# Patient Record
Sex: MALE | Race: WHITE | NOT HISPANIC OR LATINO | Employment: OTHER | URBAN - METROPOLITAN AREA
[De-identification: names, ages, dates, MRNs, and addresses within clinical notes are randomized per-mention and may not be internally consistent; named-entity substitution may affect disease eponyms.]

---

## 2018-06-05 ENCOUNTER — OFFICE VISIT CONVERTED (OUTPATIENT)
Dept: FAMILY MEDICINE CLINIC | Facility: CLINIC | Age: 68
End: 2018-06-05
Attending: NURSE PRACTITIONER

## 2018-06-14 ENCOUNTER — OFFICE VISIT CONVERTED (OUTPATIENT)
Dept: FAMILY MEDICINE CLINIC | Facility: CLINIC | Age: 68
End: 2018-06-14
Attending: NURSE PRACTITIONER

## 2019-02-08 ENCOUNTER — HOSPITAL ENCOUNTER (OUTPATIENT)
Dept: SLEEP MEDICINE | Facility: HOSPITAL | Age: 69
Discharge: HOME | End: 2019-02-08

## 2019-03-22 ENCOUNTER — HOSPITAL ENCOUNTER (OUTPATIENT)
Dept: ONCOLOGY | Facility: HOSPITAL | Age: 69
Discharge: HOME | End: 2019-03-22
Attending: NURSE PRACTITIONER

## 2019-03-22 ENCOUNTER — OFFICE VISIT CONVERTED (OUTPATIENT)
Dept: ONCOLOGY | Facility: HOSPITAL | Age: 69
End: 2019-03-22
Attending: NURSE PRACTITIONER

## 2019-03-22 LAB
ALBUMIN SERPL-MCNC: 3.8 G/DL (ref 3.5–5)
ALBUMIN/GLOB SERPL: 1.2 {RATIO} (ref 1.4–2.6)
ALP SERPL-CCNC: 93 U/L (ref 56–155)
ALT SERPL-CCNC: 23 U/L (ref 10–40)
ANION GAP SERPL CALC-SCNC: 11 MMOL/L (ref 8–19)
AST SERPL-CCNC: 19 U/L (ref 15–50)
BASOPHILS # BLD AUTO: 0.03 10*3/UL (ref 0–0.2)
BASOPHILS NFR BLD AUTO: 0.3 % (ref 0–3)
BILIRUB SERPL-MCNC: 0.55 MG/DL (ref 0.2–1.3)
BUN SERPL-MCNC: 19 MG/DL (ref 5–25)
BUN/CREAT SERPL: 18 {RATIO} (ref 6–20)
CALCIUM SERPL-MCNC: 9.1 MG/DL (ref 8.7–10.4)
CHLORIDE SERPL-SCNC: 108 MMOL/L (ref 99–111)
CONV ABS IMM GRAN: 0.02 10*3/UL (ref 0–0.2)
CONV CO2: 26 MMOL/L (ref 22–32)
CONV IMMATURE GRAN: 0.2 % (ref 0–1.8)
CONV TOTAL PROTEIN: 7.1 G/DL (ref 6.3–8.2)
CREAT UR-MCNC: 1.03 MG/DL (ref 0.7–1.2)
DEPRECATED RDW RBC AUTO: 47 FL (ref 35.1–43.9)
EOSINOPHIL # BLD AUTO: 0.19 10*3/UL (ref 0–0.7)
EOSINOPHIL # BLD AUTO: 2.2 % (ref 0–7)
ERYTHROCYTE [DISTWIDTH] IN BLOOD BY AUTOMATED COUNT: 13.2 % (ref 11.6–14.4)
GFR SERPLBLD BASED ON 1.73 SQ M-ARVRAT: >60 ML/MIN/{1.73_M2}
GLOBULIN UR ELPH-MCNC: 3.3 G/DL (ref 2–3.5)
GLUCOSE SERPL-MCNC: 78 MG/DL (ref 70–99)
HBA1C MFR BLD: 14 G/DL (ref 14–18)
HCT VFR BLD AUTO: 41.2 % (ref 42–52)
LDH SERPL-CCNC: 253 U/L (ref 120–240)
LYMPHOCYTES # BLD AUTO: 4.59 10*3/UL (ref 1–5)
MCH RBC QN AUTO: 32.5 PG (ref 27–31)
MCHC RBC AUTO-ENTMCNC: 34 G/DL (ref 33–37)
MCV RBC AUTO: 95.6 FL (ref 80–96)
MONOCYTES # BLD AUTO: 0.69 10*3/UL (ref 0.2–1.2)
MONOCYTES NFR BLD AUTO: 7.9 % (ref 3–10)
NEUTROPHILS # BLD AUTO: 3.17 10*3/UL (ref 2–8)
NEUTROPHILS NFR BLD AUTO: 36.6 % (ref 30–85)
NRBC CBCN: 0 % (ref 0–0.7)
OSMOLALITY SERPL CALC.SUM OF ELEC: 293 MOSM/KG (ref 273–304)
PLATELET # BLD AUTO: 102 10*3/UL (ref 130–400)
PMV BLD AUTO: 13.3 FL (ref 9.4–12.4)
POTASSIUM SERPL-SCNC: 4.2 MMOL/L (ref 3.5–5.3)
RBC # BLD AUTO: 4.31 10*6/UL (ref 4.7–6.1)
SODIUM SERPL-SCNC: 141 MMOL/L (ref 135–147)
VARIANT LYMPHS NFR BLD MANUAL: 52.8 % (ref 20–45)
WBC # BLD AUTO: 8.69 10*3/UL (ref 4.8–10.8)

## 2019-04-02 ENCOUNTER — HOSPITAL ENCOUNTER (OUTPATIENT)
Dept: CT IMAGING | Facility: HOSPITAL | Age: 69
Discharge: HOME | End: 2019-04-02
Attending: NURSE PRACTITIONER

## 2019-04-19 ENCOUNTER — HOSPITAL ENCOUNTER (OUTPATIENT)
Dept: URGENT CARE | Facility: CLINIC | Age: 69
Discharge: HOME | End: 2019-04-19
Attending: PHYSICIAN ASSISTANT

## 2019-04-22 LAB
B BURGDOR IGG+IGM SER-ACNC: NORMAL
E CHAFFEENSIS IGG TITR SER IF: NEGATIVE {TITER}
E. CHAFFEENSIS (HME) IGM TITER: NEGATIVE

## 2019-04-23 LAB
R RICKETTSI IGG SER QL IA: NEGATIVE
R RICKETTSI IGM TITR SER: 0.4 INDEX (ref 0–0.89)

## 2019-08-01 ENCOUNTER — HOSPITAL ENCOUNTER (OUTPATIENT)
Dept: CT IMAGING | Facility: HOSPITAL | Age: 69
Discharge: HOME | End: 2019-08-01
Attending: NURSE PRACTITIONER

## 2019-08-01 LAB
CREAT BLD-MCNC: 0.9 MG/DL (ref 0.6–1.4)
GFR SERPLBLD BASED ON 1.73 SQ M-ARVRAT: >60 ML/MIN/{1.73_M2}

## 2019-08-06 ENCOUNTER — OFFICE VISIT CONVERTED (OUTPATIENT)
Dept: ONCOLOGY | Facility: HOSPITAL | Age: 69
End: 2019-08-06
Attending: NURSE PRACTITIONER

## 2019-08-06 ENCOUNTER — HOSPITAL ENCOUNTER (OUTPATIENT)
Dept: ONCOLOGY | Facility: HOSPITAL | Age: 69
Discharge: HOME | End: 2019-08-06
Attending: NURSE PRACTITIONER

## 2019-08-06 LAB
ALBUMIN SERPL-MCNC: 3.9 G/DL (ref 3.5–5)
ALBUMIN/GLOB SERPL: 1.3 {RATIO} (ref 1.4–2.6)
ALP SERPL-CCNC: 99 U/L (ref 56–155)
ALT SERPL-CCNC: 17 U/L (ref 10–40)
ANION GAP SERPL CALC-SCNC: 14 MMOL/L (ref 8–19)
AST SERPL-CCNC: 19 U/L (ref 15–50)
BASOPHILS # BLD AUTO: 0.03 10*3/UL (ref 0–0.2)
BASOPHILS NFR BLD AUTO: 0.4 % (ref 0–3)
BILIRUB SERPL-MCNC: 0.52 MG/DL (ref 0.2–1.3)
BUN SERPL-MCNC: 19 MG/DL (ref 5–25)
BUN/CREAT SERPL: 23 {RATIO} (ref 6–20)
CALCIUM SERPL-MCNC: 8.7 MG/DL (ref 8.7–10.4)
CHLORIDE SERPL-SCNC: 105 MMOL/L (ref 99–111)
CONV ABS IMM GRAN: 0.04 10*3/UL (ref 0–0.2)
CONV CO2: 24 MMOL/L (ref 22–32)
CONV IMMATURE GRAN: 0.5 % (ref 0–1.8)
CONV TOTAL PROTEIN: 6.8 G/DL (ref 6.3–8.2)
CREAT UR-MCNC: 0.84 MG/DL (ref 0.7–1.2)
DEPRECATED RDW RBC AUTO: 48.6 FL (ref 35.1–43.9)
EOSINOPHIL # BLD AUTO: 0.25 10*3/UL (ref 0–0.7)
EOSINOPHIL # BLD AUTO: 2.9 % (ref 0–7)
ERYTHROCYTE [DISTWIDTH] IN BLOOD BY AUTOMATED COUNT: 13.6 % (ref 11.6–14.4)
FOLATE SERPL-MCNC: 15.3 NG/ML (ref 4.8–20)
GFR SERPLBLD BASED ON 1.73 SQ M-ARVRAT: >60 ML/MIN/{1.73_M2}
GLOBULIN UR ELPH-MCNC: 2.9 G/DL (ref 2–3.5)
GLUCOSE SERPL-MCNC: 107 MG/DL (ref 70–99)
HCT VFR BLD AUTO: 40.1 % (ref 42–52)
HGB BLD-MCNC: 13.1 G/DL (ref 14–18)
LDH SERPL-CCNC: 209 U/L (ref 120–240)
LYMPHOCYTES # BLD AUTO: 4.43 10*3/UL (ref 1–5)
LYMPHOCYTES NFR BLD AUTO: 52 % (ref 20–45)
MCH RBC QN AUTO: 31.5 PG (ref 27–31)
MCHC RBC AUTO-ENTMCNC: 32.7 G/DL (ref 33–37)
MCV RBC AUTO: 96.4 FL (ref 80–96)
MONOCYTES # BLD AUTO: 0.9 10*3/UL (ref 0.2–1.2)
MONOCYTES NFR BLD AUTO: 10.6 % (ref 3–10)
NEUTROPHILS # BLD AUTO: 2.87 10*3/UL (ref 2–8)
NEUTROPHILS NFR BLD AUTO: 33.6 % (ref 30–85)
NRBC CBCN: 0 % (ref 0–0.7)
OSMOLALITY SERPL CALC.SUM OF ELEC: 291 MOSM/KG (ref 273–304)
PLATELET # BLD AUTO: 86 10*3/UL (ref 130–400)
PMV BLD AUTO: 13.8 FL (ref 9.4–12.4)
POTASSIUM SERPL-SCNC: 4.2 MMOL/L (ref 3.5–5.3)
RBC # BLD AUTO: 4.16 10*6/UL (ref 4.7–6.1)
SODIUM SERPL-SCNC: 139 MMOL/L (ref 135–147)
VIT B12 SERPL-MCNC: 1388 PG/ML (ref 211–911)
WBC # BLD AUTO: 8.52 10*3/UL (ref 4.8–10.8)

## 2019-08-07 ENCOUNTER — HOSPITAL ENCOUNTER (OUTPATIENT)
Dept: MRI IMAGING | Facility: HOSPITAL | Age: 69
Discharge: HOME | End: 2019-08-07
Attending: PHYSICIAN ASSISTANT

## 2019-08-09 ENCOUNTER — HOSPITAL ENCOUNTER (OUTPATIENT)
Dept: OTHER | Facility: HOSPITAL | Age: 69
Discharge: HOME | End: 2019-08-09
Attending: PSYCHIATRY & NEUROLOGY

## 2019-08-09 ENCOUNTER — OFFICE VISIT CONVERTED (OUTPATIENT)
Dept: NEUROLOGY | Facility: CLINIC | Age: 69
End: 2019-08-09
Attending: PSYCHIATRY & NEUROLOGY

## 2019-08-22 ENCOUNTER — HOSPITAL ENCOUNTER (OUTPATIENT)
Dept: OTHER | Facility: HOSPITAL | Age: 69
Discharge: HOME | End: 2019-08-22
Attending: INTERNAL MEDICINE

## 2019-08-22 ENCOUNTER — OFFICE VISIT CONVERTED (OUTPATIENT)
Dept: FAMILY MEDICINE CLINIC | Facility: CLINIC | Age: 69
End: 2019-08-22
Attending: NURSE PRACTITIONER

## 2019-08-22 ENCOUNTER — HOSPITAL ENCOUNTER (OUTPATIENT)
Dept: GENERAL RADIOLOGY | Facility: HOSPITAL | Age: 69
Discharge: HOME | End: 2019-08-22
Attending: NURSE PRACTITIONER

## 2019-08-22 LAB
AMPHETAMINES UR QL SCN: NEGATIVE
BARBITURATES UR QL SCN: NEGATIVE
BENZODIAZ UR QL SCN: NEGATIVE
CONV COCAINE, UR: NEGATIVE
METHADONE UR QL SCN: NEGATIVE
OPIATES TESTED UR SCN: NEGATIVE
OXYCODONE UR QL SCN: NEGATIVE
PCP UR QL: NEGATIVE
THC SERPLBLD CFM-MCNC: NEGATIVE NG/ML

## 2019-09-09 ENCOUNTER — OFFICE VISIT CONVERTED (OUTPATIENT)
Dept: NEUROLOGY | Facility: CLINIC | Age: 69
End: 2019-09-09
Attending: PSYCHIATRY & NEUROLOGY

## 2019-09-23 ENCOUNTER — HOSPITAL ENCOUNTER (OUTPATIENT)
Dept: URGENT CARE | Facility: CLINIC | Age: 69
Discharge: HOME | End: 2019-09-23
Attending: NURSE PRACTITIONER

## 2019-09-27 ENCOUNTER — HOSPITAL ENCOUNTER (OUTPATIENT)
Dept: URGENT CARE | Facility: CLINIC | Age: 69
Discharge: HOME | End: 2019-09-27
Attending: FAMILY MEDICINE

## 2019-09-29 LAB — BACTERIA SPEC AEROBE CULT: NORMAL

## 2020-02-06 ENCOUNTER — CONVERSION ENCOUNTER (OUTPATIENT)
Dept: FAMILY MEDICINE CLINIC | Facility: CLINIC | Age: 70
End: 2020-02-06

## 2020-02-06 ENCOUNTER — OFFICE VISIT CONVERTED (OUTPATIENT)
Dept: FAMILY MEDICINE CLINIC | Facility: CLINIC | Age: 70
End: 2020-02-06
Attending: NURSE PRACTITIONER

## 2020-02-06 ENCOUNTER — HOSPITAL ENCOUNTER (OUTPATIENT)
Dept: FAMILY MEDICINE CLINIC | Facility: CLINIC | Age: 70
Discharge: HOME | End: 2020-02-06
Attending: NURSE PRACTITIONER

## 2020-02-06 LAB
ALBUMIN SERPL-MCNC: 4.9 G/DL (ref 3.5–5)
ALBUMIN/GLOB SERPL: 2.5 {RATIO} (ref 1.4–2.6)
ALP SERPL-CCNC: 88 U/L (ref 56–155)
ALT SERPL-CCNC: 15 U/L (ref 10–40)
ANION GAP SERPL CALC-SCNC: 18 MMOL/L (ref 8–19)
AST SERPL-CCNC: 17 U/L (ref 15–50)
BASOPHILS # BLD AUTO: 0.06 10*3/UL (ref 0–0.2)
BASOPHILS NFR BLD AUTO: 0.6 % (ref 0–3)
BILIRUB SERPL-MCNC: 0.51 MG/DL (ref 0.2–1.3)
BUN SERPL-MCNC: 14 MG/DL (ref 5–25)
BUN/CREAT SERPL: 16 {RATIO} (ref 6–20)
CALCIUM SERPL-MCNC: 9.4 MG/DL (ref 8.7–10.4)
CHLORIDE SERPL-SCNC: 105 MMOL/L (ref 99–111)
CHOLEST SERPL-MCNC: 129 MG/DL (ref 107–200)
CHOLEST/HDLC SERPL: 2.9 {RATIO} (ref 3–6)
CONV ABS IMM GRAN: 0.02 10*3/UL (ref 0–0.2)
CONV CO2: 24 MMOL/L (ref 22–32)
CONV IMMATURE GRAN: 0.2 % (ref 0–1.8)
CONV TOTAL PROTEIN: 6.9 G/DL (ref 6.3–8.2)
CREAT UR-MCNC: 0.89 MG/DL (ref 0.7–1.2)
DEPRECATED RDW RBC AUTO: 46.5 FL (ref 35.1–43.9)
EOSINOPHIL # BLD AUTO: 0.2 10*3/UL (ref 0–0.7)
EOSINOPHIL # BLD AUTO: 2.2 % (ref 0–7)
ERYTHROCYTE [DISTWIDTH] IN BLOOD BY AUTOMATED COUNT: 12.9 % (ref 11.6–14.4)
EST. AVERAGE GLUCOSE BLD GHB EST-MCNC: 120 MG/DL
GFR SERPLBLD BASED ON 1.73 SQ M-ARVRAT: >60 ML/MIN/{1.73_M2}
GLOBULIN UR ELPH-MCNC: 2 G/DL (ref 2–3.5)
GLUCOSE SERPL-MCNC: 90 MG/DL (ref 70–99)
HBA1C MFR BLD: 5.8 % (ref 3.5–5.7)
HCT VFR BLD AUTO: 42.8 % (ref 42–52)
HDLC SERPL-MCNC: 44 MG/DL (ref 40–60)
HGB BLD-MCNC: 13.8 G/DL (ref 14–18)
LDLC SERPL CALC-MCNC: 71 MG/DL (ref 70–100)
LYMPHOCYTES # BLD AUTO: 4.91 10*3/UL (ref 1–5)
LYMPHOCYTES NFR BLD AUTO: 52.9 % (ref 20–45)
MCH RBC QN AUTO: 31.5 PG (ref 27–31)
MCHC RBC AUTO-ENTMCNC: 32.2 G/DL (ref 33–37)
MCV RBC AUTO: 97.7 FL (ref 80–96)
MONOCYTES # BLD AUTO: 0.78 10*3/UL (ref 0.2–1.2)
MONOCYTES NFR BLD AUTO: 8.4 % (ref 3–10)
NEUTROPHILS # BLD AUTO: 3.32 10*3/UL (ref 2–8)
NEUTROPHILS NFR BLD AUTO: 35.7 % (ref 30–85)
NRBC CBCN: 0 % (ref 0–0.7)
OSMOLALITY SERPL CALC.SUM OF ELEC: 296 MOSM/KG (ref 273–304)
PLATELET # BLD AUTO: 103 10*3/UL (ref 130–400)
PMV BLD AUTO: 13.2 FL (ref 9.4–12.4)
POTASSIUM SERPL-SCNC: 4.4 MMOL/L (ref 3.5–5.3)
PSA SERPL-MCNC: 1.16 NG/ML (ref 0–4)
RBC # BLD AUTO: 4.38 10*6/UL (ref 4.7–6.1)
SODIUM SERPL-SCNC: 143 MMOL/L (ref 135–147)
T4 FREE SERPL-MCNC: 1.4 NG/DL (ref 0.9–1.8)
TRIGL SERPL-MCNC: 70 MG/DL (ref 40–150)
TSH SERPL-ACNC: 3.59 M[IU]/L (ref 0.27–4.2)
VLDLC SERPL-MCNC: 14 MG/DL (ref 5–37)
WBC # BLD AUTO: 9.29 10*3/UL (ref 4.8–10.8)

## 2020-07-15 ENCOUNTER — OFFICE VISIT CONVERTED (OUTPATIENT)
Dept: FAMILY MEDICINE CLINIC | Facility: CLINIC | Age: 70
End: 2020-07-15
Attending: NURSE PRACTITIONER

## 2020-07-15 ENCOUNTER — CONVERSION ENCOUNTER (OUTPATIENT)
Dept: FAMILY MEDICINE CLINIC | Facility: CLINIC | Age: 70
End: 2020-07-15

## 2020-07-18 ENCOUNTER — HOSPITAL ENCOUNTER (OUTPATIENT)
Dept: URGENT CARE | Facility: CLINIC | Age: 70
Discharge: HOME | End: 2020-07-18

## 2020-08-04 ENCOUNTER — HOSPITAL ENCOUNTER (OUTPATIENT)
Dept: ONCOLOGY | Facility: HOSPITAL | Age: 70
Discharge: HOME | End: 2020-08-04
Attending: NURSE PRACTITIONER

## 2020-08-04 ENCOUNTER — OFFICE VISIT CONVERTED (OUTPATIENT)
Dept: ONCOLOGY | Facility: HOSPITAL | Age: 70
End: 2020-08-04
Attending: NURSE PRACTITIONER

## 2020-08-04 LAB
ANION GAP SERPL CALC-SCNC: 11 MMOL/L (ref 8–19)
BASOPHILS # BLD AUTO: 0.03 10*3/UL (ref 0–0.2)
BASOPHILS NFR BLD AUTO: 0.4 % (ref 0–3)
BUN SERPL-MCNC: 16 MG/DL (ref 5–25)
BUN/CREAT SERPL: 16 {RATIO} (ref 6–20)
CALCIUM SERPL-MCNC: 8.8 MG/DL (ref 8.7–10.4)
CHLORIDE SERPL-SCNC: 107 MMOL/L (ref 99–111)
CONV ABS IMM GRAN: 0.01 10*3/UL (ref 0–0.54)
CONV CO2: 28 MMOL/L (ref 22–32)
CONV EOSINOPHILS PERCENT BY MANUAL COUNT: 2.8 % (ref 0–7)
CONV IMMATURE GRAN: 0.1 % (ref 0–0.4)
CREAT UR-MCNC: 0.98 MG/DL (ref 0.7–1.2)
EOSINOPHIL # BLD MANUAL: 0.2 10*3/UL (ref 0–0.7)
ERYTHROCYTE [DISTWIDTH] IN BLOOD BY AUTOMATED COUNT: 12.9 % (ref 11.5–14.5)
ERYTHROCYTE [DISTWIDTH] IN BLOOD BY AUTOMATED COUNT: 46 FL
GFR SERPLBLD BASED ON 1.73 SQ M-ARVRAT: >60 ML/MIN/{1.73_M2}
GLUCOSE SERPL-MCNC: 86 MG/DL (ref 70–99)
HBA1C MFR BLD: 13.4 G/DL (ref 14–18)
HCT VFR BLD AUTO: 41.7 % (ref 42–52)
LDH SERPL-CCNC: 221 U/L (ref 120–240)
LYMPHOCYTES # BLD AUTO: 4 10*3/UL (ref 1–5)
LYMPHOCYTES NFR BLD AUTO: 55.3 % (ref 20–45)
MCH RBC QN AUTO: 31.2 PG (ref 27–31)
MCHC RBC AUTO-ENTMCNC: 32.1 G/DL (ref 33–37)
MCV RBC AUTO: 97.2 FL (ref 80–96)
MONOCYTES # BLD AUTO: 0.52 10*3/UL (ref 0.2–1.2)
MONOCYTES NFR BLD MANUAL: 7.2 % (ref 3–10)
NEUTROPHILS # BLD AUTO: 2.47 10*3/UL (ref 2–8)
NEUTROPHILS NFR BLD MANUAL: 34.2 % (ref 30–85)
OSMOLALITY SERPL CALC.SUM OF ELEC: 293 MOSM/KG (ref 273–304)
PLATELET # BLD AUTO: 25 10*3/UL (ref 130–400)
PMV BLD AUTO: ABNORMAL FL (ref 7.4–10.4)
POTASSIUM SERPL-SCNC: 4.5 MMOL/L (ref 3.5–5.3)
RBC MORPH BLD: 4.29 10*6/UL (ref 4.7–6.1)
SODIUM SERPL-SCNC: 141 MMOL/L (ref 135–147)
WBC # BLD AUTO: 7.23 10*3/UL (ref 4.8–10.8)

## 2020-08-05 ENCOUNTER — HOSPITAL ENCOUNTER (OUTPATIENT)
Dept: OTHER | Facility: HOSPITAL | Age: 70
Discharge: HOME | End: 2020-08-05
Attending: NURSE PRACTITIONER

## 2020-08-05 LAB
BASOPHILS # BLD AUTO: 0.02 10*3/UL (ref 0–0.2)
BASOPHILS # BLD AUTO: 0.03 10*3/UL (ref 0–0.2)
BASOPHILS # BLD: 0 % (ref 0–3)
BASOPHILS NFR BLD AUTO: 0.2 % (ref 0–3)
BASOPHILS NFR BLD AUTO: 0.4 % (ref 0–3)
CONV ABS BANDS: 0 % (ref 1–5)
CONV ABS IMM GRAN: 0.02 10*3/UL (ref 0–0.2)
CONV ABS IMM GRAN: 0.02 10*3/UL (ref 0–0.2)
CONV ANISOCYTES: SLIGHT
CONV HYPOCHROMIA IN BLOOD BY LIGHT MICROSCOPY: SLIGHT
CONV IMMATURE GRAN: 0.2 % (ref 0–1.8)
CONV IMMATURE GRAN: 0.2 % (ref 0–1.8)
CONV SEGMENTED NEUTROPHILS: 47 % (ref 45–70)
DEPRECATED RDW RBC AUTO: 45 FL (ref 35.1–43.9)
DEPRECATED RDW RBC AUTO: 45.3 FL (ref 35.1–43.9)
EOSINOPHIL # BLD AUTO: 0.19 10*3/UL (ref 0–0.7)
EOSINOPHIL # BLD AUTO: 0.19 10*3/UL (ref 0–0.7)
EOSINOPHIL # BLD AUTO: 2.3 % (ref 0–7)
EOSINOPHIL # BLD AUTO: 2.4 % (ref 0–7)
EOSINOPHIL NFR BLD AUTO: 0 % (ref 0–7)
ERYTHROCYTE [DISTWIDTH] IN BLOOD BY AUTOMATED COUNT: 12.7 % (ref 11.6–14.4)
ERYTHROCYTE [DISTWIDTH] IN BLOOD BY AUTOMATED COUNT: 12.8 % (ref 11.6–14.4)
ERYTHROCYTE [SEDIMENTATION RATE] IN BLOOD: 5 MM/H (ref 0–20)
FERRITIN SERPL-MCNC: 75 NG/ML (ref 30–300)
FOLATE SERPL-MCNC: 15.2 NG/ML (ref 4.8–20)
HCT VFR BLD AUTO: 41.1 % (ref 42–52)
HCT VFR BLD AUTO: 41.8 % (ref 42–52)
HGB BLD-MCNC: 13.8 G/DL (ref 14–18)
HGB BLD-MCNC: 14 G/DL (ref 14–18)
IRON SATN MFR SERPL: 34 % (ref 20–55)
IRON SERPL-MCNC: 93 UG/DL (ref 70–180)
LDH SERPL-CCNC: 232 U/L (ref 120–240)
LYMPHOCYTES # BLD AUTO: 4.06 10*3/UL (ref 1–5)
LYMPHOCYTES # BLD AUTO: 4.23 10*3/UL (ref 1–5)
LYMPHOCYTES NFR BLD AUTO: 49.1 % (ref 20–45)
LYMPHOCYTES NFR BLD AUTO: 52.5 % (ref 20–45)
MCH RBC QN AUTO: 31.9 PG (ref 27–31)
MCH RBC QN AUTO: 32 PG (ref 27–31)
MCHC RBC AUTO-ENTMCNC: 33.5 G/DL (ref 33–37)
MCHC RBC AUTO-ENTMCNC: 33.6 G/DL (ref 33–37)
MCV RBC AUTO: 95.2 FL (ref 80–96)
MCV RBC AUTO: 95.4 FL (ref 80–96)
MICROCYTES BLD QL: SLIGHT
MONOCYTES # BLD AUTO: 0.5 10*3/UL (ref 0.2–1.2)
MONOCYTES # BLD AUTO: 0.8 10*3/UL (ref 0.2–1.2)
MONOCYTES NFR BLD AUTO: 6.2 % (ref 3–10)
MONOCYTES NFR BLD AUTO: 9.7 % (ref 3–10)
MONOCYTES NFR BLD MANUAL: 3 % (ref 3–10)
NEUTROPHILS # BLD AUTO: 3.09 10*3/UL (ref 2–8)
NEUTROPHILS # BLD AUTO: 3.18 10*3/UL (ref 2–8)
NEUTROPHILS NFR BLD AUTO: 38.3 % (ref 30–85)
NEUTROPHILS NFR BLD AUTO: 38.5 % (ref 30–85)
NRBC CBCN: 0 % (ref 0–0.7)
NRBC CBCN: 0 % (ref 0–0.7)
NUC CELL # PRT MANUAL: 0 /100{WBCS}
PLAT MORPH BLD: NORMAL
PLATELET # BLD AUTO: 91 10*3/UL (ref 130–400)
PLATELET # BLD AUTO: 92 10*3/UL (ref 130–400)
PMV BLD AUTO: 13.4 FL (ref 9.4–12.4)
PMV BLD AUTO: 13.4 FL (ref 9.4–12.4)
POIKILOCYTOSIS BLD QL SMEAR: SLIGHT
POLYCHROMASIA BLD QL SMEAR: SLIGHT
RBC # BLD AUTO: 4.31 10*6/UL (ref 4.7–6.1)
RBC # BLD AUTO: 4.39 10*6/UL (ref 4.7–6.1)
RETICS # AUTO: 0.06 10*6/UL (ref 0.03–0.1)
RETICS/RBC NFR AUTO: 1.48 % (ref 0.51–1.81)
SMALL PLATELETS BLD QL SMEAR: ABNORMAL
SMUDGE CELLS IN BLOOD BY LIGHT MICROSCOPY: ABNORMAL
T4 FREE SERPL-MCNC: 1.2 NG/DL (ref 0.9–1.8)
TIBC SERPL-MCNC: 273 UG/DL (ref 245–450)
TRANSFERRIN SERPL-MCNC: 191 MG/DL (ref 215–365)
TSH SERPL-ACNC: 1.95 M[IU]/L (ref 0.27–4.2)
VARIANT LYMPHS NFR BLD MANUAL: 50 % (ref 20–45)
VIT B12 SERPL-MCNC: 1856 PG/ML (ref 211–911)
WBC # BLD AUTO: 8.06 10*3/UL (ref 4.8–10.8)
WBC # BLD AUTO: 8.27 10*3/UL (ref 4.8–10.8)

## 2020-08-06 ENCOUNTER — OFFICE VISIT CONVERTED (OUTPATIENT)
Dept: FAMILY MEDICINE CLINIC | Facility: CLINIC | Age: 70
End: 2020-08-06
Attending: NURSE PRACTITIONER

## 2020-08-06 LAB
ALBUMIN SERPL-MCNC: 3.6 G/DL (ref 2.9–4.4)
ALBUMIN/GLOB SERPL: 1.2 {RATIO} (ref 0.7–1.7)
ALPHA2 GLOB SERPL ELPH-MCNC: 0.7 G/DL (ref 0.4–1)
BETA GLOBULIN: 0.8 G/DL (ref 0.7–1.3)
CONV ALPHA-1-GLOBULIN: 0.3 G/DL (ref 0–0.4)
CONV HEPATITIS B SURFACE AG W CONFIRMATION RE: NEGATIVE
CONV IMMUNOGLOBULIN G (IGG): 1300 MG/DL (ref 603–1613)
CONV IMMUNOGLOBULIN M (IGM): 122 MG/DL (ref 15–143)
CONV PE INTERPRETATION: NORMAL
CONV PE NOTE: NORMAL
CONV TOTAL PROTEIN: 6.6 G/DL (ref 6–8.5)
EPO SERPL-ACNC: 17.6 MIU/ML (ref 2.6–18.5)
GAMMA GLOB SERPL ELPH-MCNC: 1.3 G/DL (ref 0.4–1.8)
GLOBULIN UR ELPH-MCNC: 3 G/DL (ref 2.2–3.9)
HAPTOGLOB SERPL-MCNC: 109 MG/DL (ref 34–355)
HAV IGM SERPL QL IA: NEGATIVE
HBV CORE IGM SERPL QL IA: NEGATIVE
HCV AB SER DONR QL: <0.1 S/CO RATIO (ref 0–0.9)
IGA SERPL-MCNC: 139 MG/DL (ref 61–437)
M-SPIKE: NORMAL G/DL
PROT PATTERN SERPL IFE-IMP: NORMAL

## 2020-08-07 LAB
COPPER SERPL-MCNC: 91 UG/DL (ref 72–166)
ZINC SERPL-MCNC: 114 UG/DL (ref 56–134)

## 2020-08-14 ENCOUNTER — HOSPITAL ENCOUNTER (OUTPATIENT)
Dept: CT IMAGING | Facility: HOSPITAL | Age: 70
Discharge: HOME | End: 2020-08-14
Attending: NURSE PRACTITIONER

## 2020-08-14 LAB
ALBUMIN SERPL-MCNC: 4 G/DL (ref 3.5–5)
ALBUMIN/GLOB SERPL: 1.4 {RATIO} (ref 1.4–2.6)
ALP SERPL-CCNC: 106 U/L (ref 56–155)
ALT SERPL-CCNC: 18 U/L (ref 10–40)
ANION GAP SERPL CALC-SCNC: 16 MMOL/L (ref 8–19)
AST SERPL-CCNC: 18 U/L (ref 15–50)
BASOPHILS # BLD AUTO: 0.04 10*3/UL (ref 0–0.2)
BASOPHILS NFR BLD AUTO: 0.4 % (ref 0–3)
BILIRUB SERPL-MCNC: 0.61 MG/DL (ref 0.2–1.3)
BUN SERPL-MCNC: 15 MG/DL (ref 5–25)
BUN/CREAT SERPL: 14 {RATIO} (ref 6–20)
CALCIUM SERPL-MCNC: 9.6 MG/DL (ref 8.7–10.4)
CHLORIDE SERPL-SCNC: 102 MMOL/L (ref 99–111)
CHOLEST SERPL-MCNC: 122 MG/DL (ref 107–200)
CHOLEST/HDLC SERPL: 2.8 {RATIO} (ref 3–6)
CONV ABS IMM GRAN: 0.02 10*3/UL (ref 0–0.2)
CONV CO2: 26 MMOL/L (ref 22–32)
CONV IMMATURE GRAN: 0.2 % (ref 0–1.8)
CONV TOTAL PROTEIN: 6.8 G/DL (ref 6.3–8.2)
CREAT UR-MCNC: 1.08 MG/DL (ref 0.7–1.2)
DEPRECATED RDW RBC AUTO: 47.5 FL (ref 35.1–43.9)
EOSINOPHIL # BLD AUTO: 0.14 10*3/UL (ref 0–0.7)
EOSINOPHIL # BLD AUTO: 1.6 % (ref 0–7)
ERYTHROCYTE [DISTWIDTH] IN BLOOD BY AUTOMATED COUNT: 13.2 % (ref 11.6–14.4)
GFR SERPLBLD BASED ON 1.73 SQ M-ARVRAT: >60 ML/MIN/{1.73_M2}
GLOBULIN UR ELPH-MCNC: 2.8 G/DL (ref 2–3.5)
GLUCOSE SERPL-MCNC: 112 MG/DL (ref 70–99)
HCT VFR BLD AUTO: 41.6 % (ref 42–52)
HDLC SERPL-MCNC: 44 MG/DL (ref 40–60)
HGB BLD-MCNC: 13.6 G/DL (ref 14–18)
LDLC SERPL CALC-MCNC: 66 MG/DL (ref 70–100)
LYMPHOCYTES # BLD AUTO: 4.89 10*3/UL (ref 1–5)
LYMPHOCYTES NFR BLD AUTO: 54.6 % (ref 20–45)
MCH RBC QN AUTO: 31.5 PG (ref 27–31)
MCHC RBC AUTO-ENTMCNC: 32.7 G/DL (ref 33–37)
MCV RBC AUTO: 96.3 FL (ref 80–96)
MONOCYTES # BLD AUTO: 0.61 10*3/UL (ref 0.2–1.2)
MONOCYTES NFR BLD AUTO: 6.8 % (ref 3–10)
NEUTROPHILS # BLD AUTO: 3.26 10*3/UL (ref 2–8)
NEUTROPHILS NFR BLD AUTO: 36.4 % (ref 30–85)
NRBC CBCN: 0 % (ref 0–0.7)
OSMOLALITY SERPL CALC.SUM OF ELEC: 292 MOSM/KG (ref 273–304)
PLATELET # BLD AUTO: 96 10*3/UL (ref 130–400)
PMV BLD AUTO: 13.9 FL (ref 9.4–12.4)
POTASSIUM SERPL-SCNC: 4.4 MMOL/L (ref 3.5–5.3)
RBC # BLD AUTO: 4.32 10*6/UL (ref 4.7–6.1)
SODIUM SERPL-SCNC: 140 MMOL/L (ref 135–147)
TRIGL SERPL-MCNC: 60 MG/DL (ref 40–150)
VLDLC SERPL-MCNC: 12 MG/DL (ref 5–37)
WBC # BLD AUTO: 8.96 10*3/UL (ref 4.8–10.8)

## 2020-08-15 LAB
EST. AVERAGE GLUCOSE BLD GHB EST-MCNC: 120 MG/DL
FERRITIN SERPL-MCNC: 72 NG/ML (ref 30–300)
HBA1C MFR BLD: 5.8 % (ref 3.5–5.7)
IRON SATN MFR SERPL: 42 % (ref 20–55)
IRON SERPL-MCNC: 116 UG/DL (ref 70–180)
TIBC SERPL-MCNC: 273 UG/DL (ref 245–450)
TRANSFERRIN SERPL-MCNC: 191 MG/DL (ref 215–365)

## 2020-09-17 ENCOUNTER — HOSPITAL ENCOUNTER (OUTPATIENT)
Dept: SLEEP MEDICINE | Facility: HOSPITAL | Age: 70
Discharge: HOME | End: 2020-09-17
Attending: INTERNAL MEDICINE

## 2020-10-01 ENCOUNTER — HOSPITAL ENCOUNTER (OUTPATIENT)
Dept: GENERAL RADIOLOGY | Facility: HOSPITAL | Age: 70
Discharge: HOME | End: 2020-10-01
Attending: NURSE PRACTITIONER

## 2020-10-01 ENCOUNTER — OFFICE VISIT CONVERTED (OUTPATIENT)
Dept: FAMILY MEDICINE CLINIC | Facility: CLINIC | Age: 70
End: 2020-10-01
Attending: NURSE PRACTITIONER

## 2021-01-06 ENCOUNTER — HOSPITAL ENCOUNTER (OUTPATIENT)
Dept: FAMILY MEDICINE CLINIC | Facility: CLINIC | Age: 71
Discharge: HOME | End: 2021-01-06
Attending: NURSE PRACTITIONER

## 2021-01-06 ENCOUNTER — OFFICE VISIT CONVERTED (OUTPATIENT)
Dept: FAMILY MEDICINE CLINIC | Facility: CLINIC | Age: 71
End: 2021-01-06
Attending: NURSE PRACTITIONER

## 2021-01-06 LAB
ALBUMIN SERPL-MCNC: 4.1 G/DL (ref 3.5–5)
ALBUMIN/GLOB SERPL: 1.2 {RATIO} (ref 1.4–2.6)
ALP SERPL-CCNC: 113 U/L (ref 56–155)
ALT SERPL-CCNC: 15 U/L (ref 10–40)
ANION GAP SERPL CALC-SCNC: 21 MMOL/L (ref 8–19)
AST SERPL-CCNC: 16 U/L (ref 15–50)
BASOPHILS # BLD AUTO: 0.03 10*3/UL (ref 0–0.2)
BASOPHILS NFR BLD AUTO: 0.3 % (ref 0–3)
BILIRUB SERPL-MCNC: 0.48 MG/DL (ref 0.2–1.3)
BUN SERPL-MCNC: 12 MG/DL (ref 5–25)
BUN/CREAT SERPL: 12 {RATIO} (ref 6–20)
CALCIUM SERPL-MCNC: 9.2 MG/DL (ref 8.7–10.4)
CHLORIDE SERPL-SCNC: 104 MMOL/L (ref 99–111)
CHOLEST SERPL-MCNC: 140 MG/DL (ref 107–200)
CHOLEST/HDLC SERPL: 2.7 {RATIO} (ref 3–6)
CONV ABS IMM GRAN: 0.01 10*3/UL (ref 0–0.2)
CONV CO2: 24 MMOL/L (ref 22–32)
CONV IMMATURE GRAN: 0.1 % (ref 0–1.8)
CONV TOTAL PROTEIN: 7.4 G/DL (ref 6.3–8.2)
CREAT UR-MCNC: 1.02 MG/DL (ref 0.7–1.2)
DEPRECATED RDW RBC AUTO: 47.2 FL (ref 35.1–43.9)
EOSINOPHIL # BLD AUTO: 0.18 10*3/UL (ref 0–0.7)
EOSINOPHIL # BLD AUTO: 2 % (ref 0–7)
ERYTHROCYTE [DISTWIDTH] IN BLOOD BY AUTOMATED COUNT: 13.1 % (ref 11.6–14.4)
EST. AVERAGE GLUCOSE BLD GHB EST-MCNC: 117 MG/DL
GFR SERPLBLD BASED ON 1.73 SQ M-ARVRAT: >60 ML/MIN/{1.73_M2}
GLOBULIN UR ELPH-MCNC: 3.3 G/DL (ref 2–3.5)
GLUCOSE SERPL-MCNC: 94 MG/DL (ref 70–99)
HBA1C MFR BLD: 5.7 % (ref 3.5–5.7)
HCT VFR BLD AUTO: 46.4 % (ref 42–52)
HDLC SERPL-MCNC: 51 MG/DL (ref 40–60)
HGB BLD-MCNC: 14.8 G/DL (ref 14–18)
LDLC SERPL CALC-MCNC: 68 MG/DL (ref 70–100)
LYMPHOCYTES # BLD AUTO: 5.17 10*3/UL (ref 1–5)
LYMPHOCYTES NFR BLD AUTO: 56.4 % (ref 20–45)
MCH RBC QN AUTO: 31.2 PG (ref 27–31)
MCHC RBC AUTO-ENTMCNC: 31.9 G/DL (ref 33–37)
MCV RBC AUTO: 97.7 FL (ref 80–96)
MONOCYTES # BLD AUTO: 0.7 10*3/UL (ref 0.2–1.2)
MONOCYTES NFR BLD AUTO: 7.6 % (ref 3–10)
NEUTROPHILS # BLD AUTO: 3.07 10*3/UL (ref 2–8)
NEUTROPHILS NFR BLD AUTO: 33.6 % (ref 30–85)
NRBC CBCN: 0 % (ref 0–0.7)
OSMOLALITY SERPL CALC.SUM OF ELEC: 298 MOSM/KG (ref 273–304)
PLATELET # BLD AUTO: 108 10*3/UL (ref 130–400)
PMV BLD AUTO: 13.8 FL (ref 9.4–12.4)
POTASSIUM SERPL-SCNC: 4.6 MMOL/L (ref 3.5–5.3)
PSA SERPL-MCNC: 1.04 NG/ML (ref 0–4)
RBC # BLD AUTO: 4.75 10*6/UL (ref 4.7–6.1)
SODIUM SERPL-SCNC: 144 MMOL/L (ref 135–147)
TRIGL SERPL-MCNC: 103 MG/DL (ref 40–150)
TSH SERPL-ACNC: 3.82 M[IU]/L (ref 0.27–4.2)
URATE SERPL-MCNC: 3.7 MG/DL (ref 3.5–8.5)
VLDLC SERPL-MCNC: 21 MG/DL (ref 5–37)
WBC # BLD AUTO: 9.16 10*3/UL (ref 4.8–10.8)

## 2021-05-13 NOTE — PROGRESS NOTES
Progress Note      Patient Name: Kamron Sanchez   Patient ID: [id number]   Sex: Male   YOB: 1950    Primary Care Provider: Garrett CASTRO   Referring Provider: Johnyn Vargas MD    Visit Date: October 1, 2020    Provider: GLORIA Hsu   Location: [facility]   Location Address: [address] [place] [address]  [id number]   Location Phone: [phone]          Chief Complaint  · Acute visit for right side rib pain       History Of Present Illness  Kamron Sanchez is a 70 year old /White male who presents for evaluation and treatment of:      Acute visit for right side rib pain     right side rib pain states and pain radiating around his side and up his chest x1 month progressively worse   chest xray 10.2019  ct chest low dose 8.2020 repeat in one year     of lung cancer  [facility]     He has been taking Aleve some to help with the pain       Past Medical History  Disease Name Date Onset Notes   Abnormal Blood Chemistry- Elevated RBC count 09/17/2014 --    Arthritis 09/16/2014 --    Arthropathy, unspecified; multiple sites --  --    Cancer --  --    Cervicogenic headache 08/09/2019 --    Emphysema of lung 02/09/2016 --    Heart Attack --  --    Hepatitis --  --    Hepatitis C --  --    Hyperlipemia --  --    Hyperlipidemia 09/16/2014 --    Hypertension 09/16/2014 --    Impaired fasting glucose 09/17/2014 --    Lung cancer --  --    Lung disease --  --    Lung nodule seen on imaging study 02/09/2016 --    Myocardial Infarction --  --    Shortness of Breath --  --    Tobacco Abuse, Chronic 02/09/2016 --          Past Surgical History  Procedure Name Date Notes   cardiac stents --  --    Colonoscopy --  --    Finger Surgery --  --    Hand surgery --  --    heart surgery --  --    Heart Valves --  --    Hernia --  --    Hernia Repair --  --    Skin cancer removed. --  --    Tonsillectomy --  --           Medication List  Name Date Started Instructions   atorvastatin 40 mg oral tablet  take 1 tablet (40 mg) by oral route once daily   Caltrate 600 + D 600 mg (1,500 mg)-800 unit oral tablet,chewable  --    fluticasone propionate 50 mcg/actuation nasal spray,suspension 08/06/2020 inhale 2 sprays (100 mcg) in each nostril by intranasal route once daily   gabapentin 300 mg oral capsule 07/15/2020 take 1 capsule (300 mg) by oral route 3 times per day for 90 days   garlic 1,000 mg oral capsule  take 1 capsule by oral route daily   isosorbide mononitrate 30 mg oral tablet extended release 24 hr  take 1 tablet (30 mg) by oral route once daily in the morning   nitroglycerin 0.3 mg sublingual tablet, sublingual  place 1 tablet (0.3 mg) by sublingual route at the first sign of an attack; no more than 3 tabs are recommended within a 15 minute period.   Plavix 75 mg oral tablet  take 1 tablet (75 mg) by oral route once daily   Toprol XL 50 mg oral tablet extended release 24 hr  take 1 tablet (50 mg) by oral route once daily   Vitamin B-12 oral  2500 mg qd   Vitamin C 1,000 mg oral tablet  take 1 tablet by oral route daily   Voltaren 1 % topical gel 07/15/2020 apply 2 grams to the affected area(s) by topical route 4 times per day         Allergy List  Allergen Name Date Reaction Notes   NO KNOWN DRUG ALLERGIES --  --  --          Family Medical History  Disease Name Relative/Age Notes   Breast Neoplasm, Malignant  --    Stroke  --    Cancer, Unspecified Mother/   Mother   Diabetes, unspecified type Father/   Father   Family history of certain chronic disabling diseases; arthritis Father/   Father   Family history of Arthritis Father/   Father   Family history of cancer Father/  Mother/   Mother; Father         Social History  Finding Status Start/Stop Quantity Notes   Alcohol Never --/-- --  10/01/2020 - 08/06/2020 - 08/06/2020 - 09/09/2019 - 08/09/2019 - does not drink    --  --/-- --  --    Recreational Drug Use  "Never --/-- --  09/09/2019 - 08/09/2019 - no   Retired --  --/-- --  --    Tobacco Former 12/66 2 ppd former smoker  current some day smoker, 0.5 pack per day, smoked 31 or more years  current every day smoker, 1 packs per day, smoked 31 or more years.          Immunizations  NameDate Admin Mfg Trade Name Lot Number Route Inj VIS Given VIS Publication   InfluenzaRefused 07/15/2020 NE Not Entered  NE NE     Comments:    [id number] 07/15/2020 NE Not Entered  NE NE     Comments:    Prevnar 13Refused 07/15/2020 NE Not Entered  NE NE     Comments:    Tdap07/01/2018 SKB BOOSTRIX  NE NE 07/15/2020    Comments:          Review of Systems  · Constitutional  o Denies  o : fever, chills  · Cardiovascular  o Denies  o : chest pain  · Respiratory  o Admits  o : cough      Vitals  Date Time BP Position Site L\R Cuff Size HR RR TEMP (F) WT  HT  BMI kg/m2 BSA m2 O2 Sat FR L/min FiO2 HC       07/15/2020 09:41 /70 Sitting    57 - R  98.8 175lbs 2oz 5'  10\" 25.13 1.98 98 %  21%    08/06/2020 07:24 /71 Sitting    57 - R  98.3 175lbs 2oz 5'  10\" 25.13 1.98 98 %  21%    10/01/2020 08:59 /77 Sitting    69 - R  98.3 170lbs 0oz 5'  10\" 24.39 1.95 97 %  21%          Physical Examination  · Constitutional  o Appearance  o : well-nourished, in no acute distress  · Respiratory  o Respiratory Effort  o : breathing unlabored  o Auscultation of Lungs  o : normal breath sounds throughout inspiration and expiration  · Cardiovascular  o Heart  o :   § Auscultation of Heart  § : regular rate and rhythm, no murmurs  · Musculoskeletal  o Ribs  o :   § Inspection/Palpation  § : right lateral and anterior ribs tender to palpation   · Skin and Subcutaneous Tissue  o General Inspection  o : no rashes or lesions present, no areas of discoloration  · Neurologic  o Gait and Station  o : normal gait, able to stand without difficulty          Assessment  · Rib pain on right side     786.50/R07.81      Plan  · Orders  o ACO-39: Current " medications updated and reviewed (, 1159F) - - 10/01/2020  o ACO-14: Influenza immunization administered or previously received [facility] () - - 10/01/2020  o Ribs with PA Chest (Right) 4 or more views X-Ray [facility] Preferred View (63946) - 786.50/R07.81 - 10/01/2020  · Instructions  o Patient was educated/instructed on their diagnosis, treatment and medications prior to discharge from the clinic today.  · Disposition  o will contact with diagnostics results  o FOLLOW UP PENDING RESULTS            Electronically Signed by: GLORIA Hsu -Author on October 1, 2020 09:38:55 AM

## 2021-05-13 NOTE — PROGRESS NOTES
Progress Note      Patient Name: Kamron Sanchez   Patient ID: [id number]   Sex: Male   YOB: 1950    Primary Care Provider: Garrett CASTRO   Referring Provider: Johnny Vargas MD    Visit Date: July 15, 2020    Provider: GLORIA Hsu   Location: [facility]   Location Address: [address] [place] [address]  [id number]   Location Phone: [phone]          Chief Complaint  · Annual Wellness Exam  · pt requests gabapentin refill  · joint pain in hands      History Of Present Illness  The patient is a 69 year old /White male who has come to this office for his Annual Wellness Visit.   His Primary Care Provider is Garrett CASTRO. His comprehensive Care Team list, including suppliers, has been updated on the Facesheet. His medical/family history, height, weight, BMI, and blood pressure have been reviewed and are in the chart. The Health Risk Assessment has been completed and scanned in the chart.   Medications are listed in the medication list.   The active problem list includes: Arthritis, Cervicogenic headache, Emphysema of lung, Hyperlipidemia, Hypertension, Impaired fasting glucose, Lung nodule seen on imaging study, and Tobacco Abuse, Chronic   The patient does not have a history of substance use.   Patient reports his diet is adequate.   The Mini-Cog has been administered and is scanned in chart. The results are negative. His cognitive function is without limitation.   A hearing loss screen was completed today and the result is positive, indicating a need for further evaluation, no use hearing aids today.   Patient does not have any risk factors for depression. Patient completed the PHQ-9 today and it has been scanned in the chart. The total score is 1-4.   The Timed Up and Go screen was administered today and the result is negative.   The [place] Index of [place] in ADLs indicated full function (score of 6).   A Falls Risk  Assessment has been completed, including a review of home fall hazards and medication review.   Overall, the patient's functional ability is noted by this provider to be within normal limits. His level of safety is noted to be within normal limits. His balance/gait is within normal limits. There have been no falls in the past year. Patient-specific home safety recommendations have been reviewed and a copy has been given to patient.   He denies issues with leaking urine.   There are no additional risk factors identified.   Living Will/Advanced Directive previously executed and scanned in chart.   Personalized health advice was given to the patient and a written health screening schedule was established; see Plan for details.      pt requests refill of gabapentin for chronic neck pain and headaches  UDS completed in office today    also c/o joint pain in right hand   Kamron Sanchez is a 69 year old /White male who presents for evaluation and treatment of:       Past Medical History  Disease Name Date Onset Notes   Abnormal Blood Chemistry- Elevated RBC count 09/17/2014 --    Arthritis 09/16/2014 --    Arthropathy, unspecified; multiple sites --  --    Cancer --  --    Cervicogenic headache 08/09/2019 --    Emphysema of lung 02/09/2016 --    Heart Attack --  --    Hepatitis --  --    Hepatitis C --  --    Hyperlipemia --  --    Hyperlipidemia 09/16/2014 --    Hypertension 09/16/2014 --    Impaired fasting glucose 09/17/2014 --    Lung cancer --  --    Lung disease --  --    Lung nodule seen on imaging study 02/09/2016 --    Myocardial Infarction --  --    Shortness of Breath --  --    Tobacco Abuse, Chronic 02/09/2016 --          Past Surgical History  Procedure Name Date Notes   cardiac stents --  --    Colonoscopy --  --    Finger Surgery --  --    Hand surgery --  --    heart surgery --  --    Heart Valves --  --    Hernia --  --    Hernia Repair --  --    Skin cancer removed. --  --    Tonsillectomy --  --           Medication List  Name Date Started Instructions   atorvastatin 40 mg oral tablet  take 1 tablet (40 mg) by oral route once daily   Caltrate 600 + D 600 mg (1,500 mg)-800 unit oral tablet,chewable  --    fluticasone propionate 50 mcg/actuation nasal spray,suspension 08/22/2019 inhale 2 sprays (100 mcg) in each nostril by intranasal route once daily   gabapentin 300 mg oral capsule 07/15/2020 take 1 capsule (300 mg) by oral route 3 times per day for 90 days   garlic 1,000 mg oral capsule  take 1 capsule by oral route daily   isosorbide mononitrate 30 mg oral tablet extended release 24 hr  take 1 tablet (30 mg) by oral route once daily in the morning   Lipitor 20 mg oral tablet  take 1 tablet (20 mg) by oral route once daily   nitroglycerin 0.3 mg sublingual tablet, sublingual  place 1 tablet (0.3 mg) by sublingual route at the first sign of an attack; no more than 3 tabs are recommended within a 15 minute period.   Plavix 75 mg oral tablet  take 1 tablet (75 mg) by oral route once daily   Toprol XL 50 mg oral tablet extended release 24 hr  take 1 tablet (50 mg) by oral route once daily   Vitamin B-12 oral  2500 mg qd   Vitamin C 1,000 mg oral tablet  take 1 tablet by oral route daily   Zyrtec 10 mg oral tablet 08/22/2019 take 1 tablet by oral route once a day (at bedtime) for 90 days         Allergy List  Allergen Name Date Reaction Notes   NO KNOWN DRUG ALLERGIES --  --  --          Family Medical History  Disease Name Relative/Age Notes   Breast Neoplasm, Malignant  --    Stroke  --    Cancer, Unspecified Mother/   Mother   Diabetes, unspecified type Father/   Father   Family history of certain chronic disabling diseases; arthritis Father/   Father   Family history of Arthritis Father/   Father   Family history of cancer Father/  Mother/   Mother; Father         Social History  Finding Status Start/Stop Quantity Notes   Alcohol Never --/-- --  09/09/2019 - 08/09/2019 - does not drink    --  --/-- --   "--    Recreational Drug Use Never --/-- --  09/09/2019 - 08/09/2019 - no   Retired --  --/-- --  --    Tobacco Former 12/66 2 ppd former smoker  current some day smoker, 0.5 pack per day, smoked 31 or more years  current every day smoker, 1 packs per day, smoked 31 or more years.          Immunizations  NameDate Admin Mfg Trade Name Lot Number Route Inj VIS Given VIS Publication   InfluenzaRefused 07/15/2020 NE Not Entered  NE NE     Comments:    [id number] 07/15/2020 NE Not Entered  NE NE     Comments:    Prevnar 13Refused 07/15/2020 NE Not Entered  NE NE     Comments:    Tdap07/01/2018 SKB BOOSTRIX  NE NE 07/15/2020    Comments:          Review of Systems  · Constitutional  o Denies  o : fever  · Eyes  o Denies  o : blurred vision, changes in vision  · HENT  o Denies  o : headaches  · Cardiovascular  o Denies  o : chest pain  · Respiratory  o Denies  o : cough  · Gastrointestinal  o Denies  o : nausea, vomiting, diarrhea, constipation, abdominal pain  · Genitourinary  o Denies  o : dysuria  · Integument  o Denies  o : rash  · Neurologic  o Denies  o : tingling or numbness  · Musculoskeletal  o Admits  o : joint pain, back pain  · Psychiatric  o Denies  o : depression      Vitals  Date Time BP Position Site L\R Cuff Size HR RR TEMP (F) WT  HT  BMI kg/m2 BSA m2 O2 Sat        09/09/2019 08:57 /74 Sitting    58 - R   170lbs 0oz 5'  10\" 24.39 1.95     02/06/2020 08:03 /70 Sitting    82 - R 19 98.4 174lbs 16oz 5'  10\" 25.11 1.98 95 %    07/15/2020 09:41 /70 Sitting    57 - R  98.8 175lbs 2oz 5'  10\" 25.13 1.98 98 %          Physical Examination  · Neck  o Inspection/Palpation  o : normal appearance, no masses or tenderness, trachea midline  o Range of Motion  o : range of motion within normal limits  · Respiratory  o Respiratory Effort  o : breathing unlabored  o Inspection of Chest  o : normal appearance  o Auscultation of Lungs  o : normal breath sounds throughout inspiration and " expiration  · Cardiovascular  o Heart  o :   § Auscultation of Heart  § : regular rate and rhythm, no murmurs  o Peripheral Vascular System  o :   § Carotid Arteries  § : no bruits present  § Pedal Pulses  § : pulses 2 bilaterally  § Extremities  § : no clubbing or edema  · Gastrointestinal  o Abdominal Examination  o : abdomen nontender to palpation, tone normal without rigidity or guarding, no masses present, bowel sounds present in all four quadrants  · Musculoskeletal  o Right Lower Extremity  o : pain in multiple joints hand, mild swelling, no erythema  · Skin and Subcutaneous Tissue  o General Inspection  o : pink, warm, dry   · Neurologic  o Mental Status Examination  o :   § Orientation  § : grossly oriented to person, place and time  o Gait and Station  o : normal gait, able to stand without difficulty  · Psychiatric  o Judgement and Insight  o : judgment and insight intact  o Mood and Affect  o : mood normal, affect appropriate          Results  · In-Office Procedures  o Lab procedure  § IOP - Urine Drug Screen In-House [facility] ([address])   § Amphetamines Ur Ql: Negative   § Barbiturates Ur Ql: Negative   § Buprenorphine+Nor Ur Ql Scn: Negative   § Benzodiaz Ur Ql: Negative   § Cocaine Ur Ql: Negative   § Methadone Ur Ql: Negative   § Methamphet Ur Ql: Negative   § MDMA Ur Ql Scn: Negative   § Opiates Ur Ql: Negative   § Oxycodone Ur Ql: Negative   § PCP Ur Ql: Negative   § THC Ur Ql: Negative   § Temp in Range?: Within/Acceptable   § Control Seen?: Yes       Assessment  · Encounter for Medicare annual wellness exam     V70.0/Z00.00  · Screening for depression     V79.0/Z13.89  · Screening for alcoholism     V79.1/Z13.39  · Cervical pain (neck)     723.1/M54.2  controlled with gabapentin   · Emphysema of lung     492.8/J43.9  · Headache     784.0/R51  · Hyperlipidemia     272.4/E78.5  · Impaired fasting glucose     790.21/R73.01  · Other long term (current) drug therapy     V58.69/Z79.899  · Joint  pain     719.40/M25.50      Plan  · Orders  o Falls Risk Assessment Completed (3288F) - V70.0/Z00.00 - 07/15/2020  o Brief hearing screening (written) [facility] () - V70.0/Z00.00 - 07/15/2020  o Annual Wellness Visit-includes a Personalized Prevention Plan of Service (PPS), SUBSEQUENT VISIT (Medicare) () - V70.0/Z00.00 - 07/15/2020  o Presence or absence of urinary incontinence assessed (AZIZA) (1090F) - V70.0/Z00.00 - 07/15/2020  o ACO-18: Negative screen for clinical depression using a standardized tool () - V79.0/Z13.89 - 07/15/2020   2  o Negative alcohol screening () - V79.1/Z13.39 - 07/15/2020  o ACO-39: Current medications updated and reviewed () - - 07/15/2020  o ACO-14: Influenza immunization was not administered for reasons documented () - - 07/15/2020   Patient refused  o ACO - Advance Care Plan or Surrogate Decision Maker documented in EMR (1123F) - - 07/15/2020  o GARETT Report (KASPR) - - 07/15/2020  o ACO-13: Fall Risk Screening with no falls in past year or only one fall without injury in the past year (1101F) - - 07/15/2020  o Estab. Pt. 15 Min Low/Moderate (80243) - 723.1/M54.2, 784.0/R51 - 07/15/2020  · Medications  o Voltaren 1 % topical gel   SIG: apply 2 grams to the affected area(s) by topical route 4 times per day   DISP: (1) 100 gm tube with 5 refills  Prescribed on 07/15/2020     o gabapentin 300 mg oral capsule   SIG: take 1 capsule (300 mg) by oral route 3 times per day for 90 days   DISP: (270) capsules with 1 refills  Adjusted on 07/15/2020     o Medications have been Reconciled  o Transition of Care or Provider Policy  · Instructions  o Health Risk Assessment has been reviewed with the patient.  o Written health screening schedule for next 5-10 years was established with patient; information scanned in chart and given/mailed to patient.  o Fall prevention methods discussed and a copy of recommendations given/mailed to patient.  o Today's PHQ-9 score is:  _2__  o Depression Screen completed and scanned into the EMR under the designated folder within the patient's documents.  o Advised that cheeses and other sources of dairy fats, animal fats, fast food, and the extras (candy, pastries, pies, doughnuts and cookies) all contain LDL raising nutrients. Advised to increase fruits, vegetables, whole grains, and to monitor portion sizes.   o Patient was educated/instructed on their diagnosis, treatment and medications prior to discharge from the clinic today.  · Disposition  o Follow Up in Office in 1 month or sooner if needed  o obtain labs prior to follow up appt            Electronically Signed by: GLORIA Hsu -Author on July 15, 2020 10:51:02 AM

## 2021-05-14 VITALS
WEIGHT: 176.37 LBS | HEIGHT: 70 IN | OXYGEN SATURATION: 100 % | SYSTOLIC BLOOD PRESSURE: 134 MMHG | HEART RATE: 60 BPM | DIASTOLIC BLOOD PRESSURE: 79 MMHG | TEMPERATURE: 98 F | BODY MASS INDEX: 25.25 KG/M2

## 2021-05-14 VITALS
BODY MASS INDEX: 24.34 KG/M2 | WEIGHT: 170 LBS | TEMPERATURE: 98.3 F | SYSTOLIC BLOOD PRESSURE: 123 MMHG | DIASTOLIC BLOOD PRESSURE: 77 MMHG | HEART RATE: 69 BPM | OXYGEN SATURATION: 97 % | HEIGHT: 70 IN

## 2021-05-14 NOTE — PROGRESS NOTES
"   Progress Note      Patient Name: Kamron Sanchez   Patient ID: [id number]   Sex: Male   YOB: 1950    Primary Care Provider: Garrett CASTRO   Referring Provider: Garrett CASTRO    Visit Date: January 6, 2021    Provider: GLORIA Hsu   Location: [facility]   Location Address: [address] [place] [address]  [id number]   Location Phone: [phone]          Chief Complaint  · follow up htn, hyperlipidemia, headaches, neck pain, allergies, and emphysema       History Of Present Illness  Kamron Sanchez is a 70 year old /White male who presents for evaluation and treatment of:      follow up htn, hyperlipidemia, headaches, neck pain, allergies, and emphysema   medication refill  routine labs     c/o of right thumb and finger  states that he \"thinks it is arthritis\"  reports that he has been using blue emu and aleve  reports that \"it helps some\"  also states that same pain in his  right great toe.     [facility]   dr [place] for chronic neck pain   received GRACIE in past states he plans to complete another series of injections this year     cardiology dr david Hernandes in [place]  dermatology dr mcnulty    [facility]/ cancer [facility] center    colonoscopy 2016                 Past Medical History  Disease Name Date Onset Notes   Abnormal Blood Chemistry- Elevated RBC count 09/17/2014 --    Arthritis 09/16/2014 --    Arthropathy, unspecified; multiple sites --  --    Cancer --  --    Cervicogenic headache 08/09/2019 --    Emphysema of lung 02/09/2016 --    Heart Attack --  --    Hepatitis --  --    Hepatitis C --  --    Hyperlipemia --  --    Hyperlipidemia 09/16/2014 --    Hypertension 09/16/2014 --    Impaired fasting glucose 09/17/2014 --    Lung cancer --  --    Lung disease --  --    Lung nodule seen on imaging study 02/09/2016 --    Myocardial Infarction --  --    Shortness of Breath --  --    Tobacco Abuse, " Chronic 02/09/2016 --          Past Surgical History  Procedure Name Date Notes   cardiac stents --  --    Colonoscopy --  --    Finger Surgery --  --    Hand surgery --  --    heart surgery --  --    Heart Valves --  --    Hernia --  --    Hernia Repair --  --    Skin cancer removed. --  --    Tonsillectomy --  --          Medication List  Name Date Started Instructions   atorvastatin 40 mg oral tablet  take 1 tablet (40 mg) by oral route once daily   Caltrate 600 + D 600 mg (1,500 mg)-800 unit oral tablet,chewable  --    fluorouracil 5 % topical cream  apply a sufficient amount to cover the lesions in the affected area(s) by topical route 2 times per day   fluticasone propionate 50 mcg/actuation nasal spray,suspension 01/06/2021 inhale 2 sprays (100 mcg) in each nostril by intranasal route once daily   gabapentin 300 mg oral capsule 07/15/2020 take 1 capsule (300 mg) by oral route 3 times per day for 90 days   garlic 1,000 mg oral capsule  take 1 capsule by oral route daily   isosorbide mononitrate 30 mg oral tablet extended release 24 hr  take 1 tablet (30 mg) by oral route once daily in the morning   nitroglycerin 0.3 mg sublingual tablet, sublingual  place 1 tablet (0.3 mg) by sublingual route at the first sign of an attack; no more than 3 tabs are recommended within a 15 minute period.   Plavix 75 mg oral tablet  take 1 tablet (75 mg) by oral route once daily   Toprol XL 50 mg oral tablet extended release 24 hr  take 1 tablet (50 mg) by oral route once daily   Vitamin B-12 oral  2500 mg qd   Vitamin C 1,000 mg oral tablet  take 1 tablet by oral route daily   Voltaren 1 % topical gel 01/06/2021 apply 2 grams to the affected area(s) by topical route 4 times per day         Allergy List  Allergen Name Date Reaction Notes   NO KNOWN DRUG ALLERGIES --  --  --          Family Medical History  Disease Name Relative/Age Notes   Breast Neoplasm, Malignant  --    Stroke  --    Cancer, Unspecified Mother/   Mother    Diabetes, unspecified type Father/   Father   Family history of certain chronic disabling diseases; arthritis Father/   Father   Family history of Arthritis Father/   Father   Family history of cancer Father/  Mother/   Mother; Father         Social History  Finding Status Start/Stop Quantity Notes   Alcohol Never --/-- --  10/01/2020 - 08/06/2020 - 08/06/2020 - 09/09/2019 - 08/09/2019 - does not drink    --  --/-- --  --    Recreational Drug Use Never --/-- --  09/09/2019 - 08/09/2019 - no   Retired --  --/-- --  --    Tobacco Former 12/66 2 ppd former smoker  current some day smoker, 0.5 pack per day, smoked 31 or more years  current every day smoker, 1 packs per day, smoked 31 or more years.          Immunizations  NameDate Admin Mfg Trade Name Lot Number Route Inj VIS Given VIS Publication   Ohuatngjb23/06/2021 PMC Fluzone Quadrivalent [id number] IM LD 01/06/2021 08/15/2019   Comments: ndc: 9175-3441-46   Ncfgwsxhz4644/06/2021 MSD PNEUMOVAX 23 [id number] IM RD 01/06/2021 04/24/2015   Comments: NDC: 9349-0252-58   [id number] 07/15/2020 NE Not Entered  NE NE     Comments:    Prevnar 13Refused 07/15/2020 NE Not Entered  NE NE     Comments:    Tdap07/01/2018 SKB BOOSTRIX  NE NE 07/15/2020    Comments:          Review of Systems  · Constitutional  o Denies  o : fever, chills  · Eyes  o Denies  o : changes in vision  · HENT  o Denies  o : ear pain, sore throat  · Cardiovascular  o Denies  o : chest Pain  · Respiratory  o Denies  o : frequent cough, shortness of breath  · Gastrointestinal  o Denies  o : nausea, vomiting, changes in bowel habits  · Genitourinary  o Denies  o : dysuria  · Neurologic  o Denies  o : headache  · Musculoskeletal  o Admits  o : joint pain, neck pain  · Allergic-Immunologic  o Admits  o : seasonal allergies      Vitals  Date Time BP Position Site L\R Cuff Size HR RR TEMP (F) WT  HT  BMI kg/m2 BSA m2 O2 Sat FR L/min FiO2 HC       08/06/2020 07:24 /71 Sitting    57 - R  98.3  "175lbs 2oz 5'  10\" 25.13 1.98 98 %  21%    10/01/2020 08:59 /77 Sitting    69 - R  98.3 170lbs 0oz 5'  10\" 24.39 1.95 97 %  21%    01/06/2021 07:16 /79 Sitting    60 - R  98 176lbs 6oz 5'  10\" 25.31 1.99 100 %  21%          Physical Examination  · Constitutional  o Appearance  o : well-nourished, in no acute distress  · Eyes  o Conjunctivae  o : conjunctivae normal  o Sclerae  o : sclerae white  o Pupils and Irises  o : pupils equal and round  o Eyelids/Ocular Adnexae  o : eyelid appearance normal, no exudates present  · Ears, Nose, Mouth and Throat  o Ears  o :   § External Ears  § : external auditory canal appearance within normal limits  § Otoscopic Examination  § : tympanic membrane appearance within normal limits bilaterally  o Nose  o :   § External Nose  § : appearance normal  § Intranasal Exam  § : mucosa within normal limits  § Nasopharynx  § : no discharge present  o Oral Cavity  o :   § Oral Mucosa  § : oral mucosa normal  o Throat  o :   § Oropharynx  § : no inflammation or lesions present, tonsils within normal limits  · Neck  o Inspection/Palpation  o : normal appearance, trachea midline  o Thyroid  o : gland size normal, nontender  · Respiratory  o Respiratory Effort  o : breathing unlabored  o Auscultation of Lungs  o : normal breath sounds throughout inspiration and expiration  · Cardiovascular  o Heart  o :   § Auscultation of Heart  § : regular rate and rhythm, no murmurs  o Peripheral Vascular System  o :   § Carotid Arteries  § : no bruits present  § Extremities  § : no clubbing or edema  · Gastrointestinal  o Abdominal Examination  o : abdomen nontender to palpation, bowel sounds present   · Lymphatic  o Neck  o : no lymphadenopathy present  · Musculoskeletal  o Right Upper Extremity  o :   § Inspection/Palpation  § : joints tender thumb, no edema, no erythema   o Left Upper Extremity  o :   § Inspection/Palpation  § : no tenderness to palpation, ROM normal  o Right Lower " Extremity  o :   § Inspection/Palpation  § : no joint or limb tenderness to palpation, ROM normal  o Left Lower Extremity  o :   § Inspection/Palpation  § : MTP joint tender to palpation, no erythema, no edema  · Skin and Subcutaneous Tissue  o General Inspection  o : pink, warm, dry   · Neurologic  o Mental Status Examination  o :   § Orientation  § : grossly oriented to person, place and time  o Gait and Station  o : normal gait, able to stand without difficulty  · Psychiatric  o Judgement and Insight  o : judgment and insight intact  o Mood and Affect  o : mood normal, affect appropriate          Results  · In-Office Procedures  o Lab procedure  § IOP - Urine Drug Screen In-House [facility] ([id number])   § Amphetamines Ur Ql: Negative   § Barbiturates Ur Ql: Negative   § Buprenorphine+Nor Ur Ql Scn: Negative   § Benzodiaz Ur Ql: Negative   § Cocaine Ur Ql: Negative   § Methadone Ur Ql: Negative   § Methamphet Ur Ql: Negative   § MDMA Ur Ql Scn: Negative   § Opiates Ur Ql: Negative   § Oxycodone Ur Ql: Negative   § PCP Ur Ql: Negative   § THC Ur Ql: Negative   § Temp in Range?: Within/Acceptable   § Control Seen?: Yes       Assessment  · Need for influenza vaccination     V04.81/Z23  · Need for pneumococcal vaccination     V03.82/Z23  · Allergic rhinitis due to allergen     477.9/J30.9  currently controlled   · Emphysema of lung     492.8/J43.9  stable at this time   · Essential hypertension     401.9/I10  currently controlled   · Hyperlipidemia     272.4/E78.5  will obtain lipid panel   · Impaired fasting glucose     790.21/R73.01  will hgb a1c   · Neck pain     723.1/M54.2  · Encounter for screening for malignant neoplasm of prostate     V76.44/Z12.5  · Arthritis     716.90/M19.90  · Great toe pain, left     729.5/M79.675  will obtain uric acid level       Plan  · Orders  o Fluzone Quadrivalent Vaccine, age 6 months + (93614) - V04.81/Z23 - 01/06/2021   Vaccine - Influenza; Dose: 0.5; Site: Left Deltoid; Route:  Intramuscular; Date: 01/06/2021 08:05:00; Exp: 06/30/2021; Lot: [id number]; Mfg: sanofi pasteur; TradeName: Fluzone Quadrivalent; Administered By: Britt Rollins [facility]; Comment: ndc: 6850-1033-54  o Pneumococcal Vaccine, 23 valent, adult (12923) - V03.82/Z23 - 01/06/2021   Vaccine - [id number]; Dose: 0.5; Site: Right Deltoid; Route: Intramuscular; Date: 01/06/2021 08:06:00; Exp: 05/08/2022; Lot: [id number]; Mfg: [employer] TradeName: PNEUMOVAX 23; Administered By: Britt Rollins [facility]; Comment: NDC: 0191-2519-01  o Uric Acid (Serum) (22899) - 401.9/I10 - 01/06/2021  o Hgb A1c [facility] (74339) - 790.21/R73.01 - 01/06/2021  o Male Physical Primary Care Panel (CMP, CBC, TSH, Lipid, PSA) [facility] ([id number], 09679, , 06580, 41218, 78567) - - 01/06/2021  o ACO-39: Current medications updated and reviewed (1159F, ) - - 01/06/2021  · Medications  o fluticasone propionate 50 mcg/actuation nasal spray,suspension   SIG: inhale 2 sprays (100 mcg) in each nostril by intranasal route once daily   DISP: (3) Bottle with 1 refills  Refilled on 01/06/2021     o Voltaren 1 % topical gel   SIG: apply 2 grams to the affected area(s) by topical route 4 times per day   DISP: (1) Tube with 5 refills  Refilled on 01/06/2021     o Medications have been Reconciled  o Transition of Care or Provider Policy  · Instructions  o Patient was educated/instructed on their diagnosis, treatment and medications prior to discharge from the clinic today.  · Disposition  o Follow Up in Office in 6 months or sooner if needed  o will contact with diagnostics results            Electronically Signed by: GLORIA Hsu -Author on January 6, 2021 08:17:43 AM

## 2021-05-15 VITALS
OXYGEN SATURATION: 98 % | BODY MASS INDEX: 25.07 KG/M2 | SYSTOLIC BLOOD PRESSURE: 122 MMHG | DIASTOLIC BLOOD PRESSURE: 70 MMHG | HEART RATE: 57 BPM | TEMPERATURE: 98.8 F | WEIGHT: 175.12 LBS | HEIGHT: 70 IN

## 2021-05-15 VITALS
BODY MASS INDEX: 24.34 KG/M2 | HEIGHT: 70 IN | TEMPERATURE: 98 F | RESPIRATION RATE: 18 BRPM | HEART RATE: 87 BPM | SYSTOLIC BLOOD PRESSURE: 134 MMHG | DIASTOLIC BLOOD PRESSURE: 75 MMHG | OXYGEN SATURATION: 98 % | WEIGHT: 170 LBS

## 2021-05-15 VITALS
SYSTOLIC BLOOD PRESSURE: 120 MMHG | WEIGHT: 171 LBS | HEIGHT: 70 IN | HEART RATE: 63 BPM | DIASTOLIC BLOOD PRESSURE: 67 MMHG | BODY MASS INDEX: 24.48 KG/M2

## 2021-05-15 VITALS
HEART RATE: 57 BPM | HEIGHT: 70 IN | BODY MASS INDEX: 25.07 KG/M2 | SYSTOLIC BLOOD PRESSURE: 117 MMHG | WEIGHT: 175.12 LBS | OXYGEN SATURATION: 98 % | DIASTOLIC BLOOD PRESSURE: 71 MMHG | TEMPERATURE: 98.3 F

## 2021-05-15 VITALS
DIASTOLIC BLOOD PRESSURE: 70 MMHG | RESPIRATION RATE: 19 BRPM | WEIGHT: 175 LBS | HEIGHT: 70 IN | OXYGEN SATURATION: 95 % | SYSTOLIC BLOOD PRESSURE: 121 MMHG | HEART RATE: 82 BPM | BODY MASS INDEX: 25.05 KG/M2 | TEMPERATURE: 98.4 F

## 2021-05-15 VITALS
HEIGHT: 70 IN | WEIGHT: 170 LBS | BODY MASS INDEX: 24.34 KG/M2 | SYSTOLIC BLOOD PRESSURE: 129 MMHG | DIASTOLIC BLOOD PRESSURE: 74 MMHG | HEART RATE: 58 BPM

## 2021-05-16 VITALS
TEMPERATURE: 98.6 F | WEIGHT: 172 LBS | OXYGEN SATURATION: 99 % | HEIGHT: 70 IN | SYSTOLIC BLOOD PRESSURE: 123 MMHG | BODY MASS INDEX: 24.62 KG/M2 | DIASTOLIC BLOOD PRESSURE: 66 MMHG | HEART RATE: 63 BPM

## 2021-05-16 VITALS
TEMPERATURE: 98.6 F | DIASTOLIC BLOOD PRESSURE: 61 MMHG | HEIGHT: 70 IN | SYSTOLIC BLOOD PRESSURE: 116 MMHG | OXYGEN SATURATION: 98 % | HEART RATE: 65 BPM | BODY MASS INDEX: 24.62 KG/M2 | WEIGHT: 172 LBS

## 2021-05-28 VITALS
BODY MASS INDEX: 26.89 KG/M2 | TEMPERATURE: 97.7 F | BODY MASS INDEX: 29.24 KG/M2 | OXYGEN SATURATION: 98 % | HEART RATE: 66 BPM | HEART RATE: 70 BPM | WEIGHT: 175.27 LBS | TEMPERATURE: 97.7 F | SYSTOLIC BLOOD PRESSURE: 131 MMHG | SYSTOLIC BLOOD PRESSURE: 129 MMHG | RESPIRATION RATE: 18 BRPM | TEMPERATURE: 98.4 F | SYSTOLIC BLOOD PRESSURE: 127 MMHG | HEART RATE: 62 BPM | DIASTOLIC BLOOD PRESSURE: 77 MMHG | DIASTOLIC BLOOD PRESSURE: 67 MMHG | BODY MASS INDEX: 25.15 KG/M2 | WEIGHT: 186.29 LBS | HEIGHT: 67 IN | OXYGEN SATURATION: 98 % | WEIGHT: 171.3 LBS | HEIGHT: 67 IN | OXYGEN SATURATION: 97 % | DIASTOLIC BLOOD PRESSURE: 67 MMHG | RESPIRATION RATE: 18 BRPM

## 2021-05-28 NOTE — PROGRESS NOTES
Patient: SMITH,BILLY DUANE     Acct: [id number]     Report: #[id number]  UNIT #: [id number]     : 1950    Encounter Date:2020  PRIMARY CARE: DONALD BHATTI  ***Signed***  --------------------------------------------------------------------------------------------------------------------  NURSE INTAKE      Visit Type      Established Patient Visit            Chief Complaint      LUNG CANCER            Referring Provider/Copies To      Referring Provider:  DONALD BHATTI      Primary Care Provider:  DONALD BHATTI      Copies To:   DONALD BHATTI            History and Present Illness      Past Oncology Illness History       1) RUL NSCLC: -Right upper lobe lobectomy 16:  Staging: pT2aN0. Path:     Invasive adenocarcinoma, grade 3, 2 cm in size. Visceral pleura invasion with     lymphvascular invasion. Margins neg, vascular neg, neg parenchymal. 0 of 15 LN's    negative in [place] with Dr. Heller.             Treatment history:       -16: Patient presented with right shoulder pain and this included a chest     x-ray on  which showed an ill defined 3 cm opacity in the right lung base.           16:  Chest CT showed a 1.3 cm spiculated pleural based nodule anteriorly in     the right upper lobe suspicious for malignancy.  Also noted on the CT scan was     adenopathy adjacent to the GE junction measuring 2 cm and PET scan was     recommended.        2/15/16: PET scan showed hypermetabolic uptake in the right upper lobe pulmonary    nodule with a maximum SUV of 4.6.  There was a small focus of activity in the jesus    bcarinal region that was not significantly above the mediastinal blood pool     activity and was felt to be unlikely to represent metastatic disease.  The area     of suspected adenopathy that was seen near the GE junction on CT was not     hypermetabolic on PET.  There was a sclerotic lesion noted in the right iliac     bone which was also not  hypermetabolic and was felt unlikely to represent met    astatic lesion.  The patient was referred to oncology for further     recommendations. At the time of his initial visit, he was referred for biopsy of    the nodule.  He was complaining of some periodic headaches and MRI brain was     ordered.  There were no findings to suggest intracranial metastasis.  He     underwent biopsy of the RUL lesion on 3/2.  This was positive for adenocarcinoma    and also stained positive for CDX2, raising concern for GI primary.  He was     referred for upper and lower endoscopy and underwent these on 3/10 under the     care of Dr. España.  Pathology from a stomach biopsy showed chronic gastritis     and staining was positive for H pylori.  No evidence of GI malignancy was found     on EGD or colonoscopy.  He was referred to  for determination of his surgical     candidacy and underwent surgery on 4/6 with Dr. Macarena Heller.      -4/6/16:  RUL lobectomy under the care of Dr. Heller. Path: RUL lobectomy -    invasive adenocarcinoma, grade 3 (pT2aN0). -tumor size 2cm -histologic type:     adenocarcinoma -INVASION OF VISCERAL PLEURA. LYMPHOVASCULAR INVASION. Margins: -    bronchial: negative -vascular: negative -parenchymal: negative -distance to     closest margin: 1.8cm (bronchial). Ratio of positive/total nodes: 0/15.       -4/6/16.Adjuvant chemotherapy was recommended due to high risk features.      Declined chemotherapy.       -12/9/16: PET/CT showed postsurgical changes status post interval right upper     lobectomy with removal of right upper lobe cancer. No evidence of local     recurrence or thoracic lymphadenopathy. No PET/CT evidence of metastasis within     neck, abdomen, or pelvis.      -July 2017.  Chest CT showed a decrease in size of the mediastinal and hilar     lymph nodes from 12/09/2016.  No convincing evidence of metastatic disease      -12/27/17: CT chest at the [facility] showed no evidence of  cancer.  12    mm subcarinal lymph node was 15 mm previously.      -8/1/2019: No evidence of local recurrence or metastasis within the CAP.             .            HPI - Oncology Interim       1) RUL NSCLC:            Mr. Kamron Sanchez presents for 1 year follow up for NSCLC diagnosed in April of 2016. He completed a RUL lobectomy with Dr. Heller at  in [place]. This was a    Stage IB Invasive adenocarcinoma, grade III. He is 4 years out from his     diagnosis now. He does not follow up with Dr. Heller as she has moved her     practice to [place] now.             He is having yearly scans. His last scan in August of 2019 did not show any     local recurrence or mets. He is due for a repeat CT scan.             He has a history of 2 PPD smoking for many years and quit at diagnosis in 2016.             He denies any persistent cough, SOA, or hemoptysis.             2) Thrombocytopenia: Mr. Sanchez has had chronic thrombocytopenia dating back to     July 2017. He was noted to have platelet clumping on previous CBC's. His     platelet count today came back at 25,000. He denies any bleeding or bruising     noted.            Clinical Trial Participant      No            ECOG Performance Status      0            PAST, FAMILY   Past Medical History      Past Medical History:  Arthritis, Heart Attack (2007), High Cholesterol, Sleep     Apnea      Hematology/Oncology (M):  Lung Cancer            Past Surgical History      Coronary Stent            Family History      Family History:  Breast Cancer (MOM), Lung Cancer (FATHER)            Social History      Marital Status:        Lives independently:  Yes      Occupation:  retired/army            Tobacco Use      Tobacco status:  Former smoker (sept2016)      Smoking packs/day:  2      Smoking history:  25-50 pack years      Quit status:  Quit date established (1 yr)            Alcohol Use      Alcohol intake:  None            Substance Use      Substance use:   Denies use            REVIEW OF SYSTEMS      General:  Denies: Appetite Change, Fatigue, Fever, Night Sweats, Weight Gain,     Weight Loss      Eye:  Denies Blurred Vision, Denies Corrective Lenses, Denies Diplopia, Denies     Vision Changes      ENT:  Denies Headache, Denies Hearing Loss, Denies Hoarseness, Denies Sore     Throat      Cardiovascular:  Denies Chest Pain, Denies Palpitations      Respiratory:  Denies: Cough, Coughing Blood, Productive Cough, Shortness of Air,    Wheezing      Gastrointestinal:  Denies Bloody Stools, Denies Constipation, Denies Diarrhea,     Denies Nausea/Vomiting, Denies Problem Swallowing, Denies Unable to Control     Bowels      Genitourinary:  Denies Blood in Urine, Denies Incontinence, Denies Painful     Urination      Musculoskeletal:  Denies Back Pain, Denies Muscle Pain, Denies Painful Joints      Integumentary:  Denies Itching, Denies Lesions, Denies Rash      Neurologic:  Denies Dizziness, Denies Numbness\Tingling, Denies Seizures      Psychiatric:  Denies Anxiety, Denies Depression      Endocrine:  Denies Cold Intolerance, Denies Heat Intolerance      Hematologic/Lymphatic:  Denies Bruising, Denies Bleeding, Denies Enlarged Lymph     Nodes            VITAL SIGNS AND SCORES      Vitals      Weight 175 lbs 4.251 oz / 79.5 kg      Temperature 97.7 F / 36.5 C - Temporal      Pulse 66      Respirations 18      Blood Pressure 127/67 Sitting, Left Arm      Pulse Oximetry 97%, ROOM AIR            Pain Score      Experiencing any pain?:  No      Pain Scale Used:  Numerical      Pain Intensity:  0            Fatigue Score      Experiencing any fatigue?:  No      Fatigue (0-10 scale):  0 (none)            EXAM      General appearance:  in no apparent distress, cooperative, appears stated age.      HEENT: No pallor, no icterus, oral mucosa moist      Neck: Supple, trachea central-not deviated      Lymph nodes: none palpable peripherally      Cardiovascular: S1-S2 heard, no murmurs, no  rubs, no gallops.      Breast exam:      Respiratory: Clear to auscultation bilaterally, no adventitious sounds      Abdomen/gastro: Soft, nontender, no palpable hepatosplenomegaly, bowel sounds     heard      Skin: No lesions, no rashes, no petechiae.      Extremities: No pedal edema, peripheral pulses felt, no clubbing      Musculoskeletal: Normal tone, no rigidity of muscles; range of motion intact      Spine: No deformities      Psychiatric: Alert and oriented x3, appropriate affect, intact judgment      Neurologic: Cranial nerves II through XII grossly intact, no gross neurological     deficits noted.            PREVENTION      Hx Influenza Vaccination:  No      Date Influenza Vaccine Given:  Oct 1, 2016      Influenza Vaccine Declined:  Yes      2 or More Falls in Past Year?:  No      Fall Past Year with Injury?:  No      Hx Pneumococcal Vaccination:  No      Encouraged to follow-up with:  PCP regarding preventative exams.      Chart initiated by      NADINE SCHNEIDER CMA            ALLERGY/MEDS      Allergies      Coded Allergies:             NO KNOWN DRUG ALLERGIES (Verified  Allergy, Unknown, 8/4/20)            Medications      Last Reconciled on 8/4/20 12:37 by KAY EASTMAN      Ciprofloxacin HCl (Ciprofloxacin HCl) 250 Mg Tablet      250 MG PO BID for 5 Days, #10 TAB 0 Refills         Prov: YINA CLEARY cfe         7/18/20       Cetirizine Hcl (zyrTEC) Unknown Strength Tablet      PO HS for 30 Days, TAB         Reported         9/23/19       Melatonin (Melatonin) 3 Mg Tablet      3 MG PO HS, TAB         Reported         3/22/19       Gabapentin (Gabapentin) 300 Mg Capsule      300 MG PO HS, #30 CAP 0 Refills         Reported         1/31/18       Cyanocobalamin (Vitamin B-12) 5,000 Mcg Tab.subl      2500 MCG PO QDAY, TAB         Reported         5/9/16       Garlic (Garlic Oil) 1,000 Mg Capsule      1000 MG PO QDAY, CAP         Reported         2/23/16       Nitroglycerin (Nitrostat*) 0.4 Mg  Tablet      0.4 MG SL ASDIR, #25 TAB 1 Refill         Reported         2/23/16       Ascorbic Acid (Kerry C*) 500 Mg Tablet      500 TAB PO DAILY         Reported         2/27/12       Calcium Citrate/Vitamin D3 (Citracal + D Maximum Caplet) 315 Mg Tab      315 TAB PO QDAY         Reported         2/27/12       Atorvastatin Calcium (Lipitor*) 40 Mg Tablet      40 TAB PO QAM, TAB         Reported         2/27/12       Clopidogrel Bisulfate (Plavix) 75 Mg Tablet      75 TAB PO QDAY, TAB 0 Refills         Reported         8/19/09       Metoprolol Succinate (Metoprolol Succinate) 50 Mg Tabcr      50 TAB PO QAM, TAB 0 Refills         Reported         8/19/09       Isosorbide Mononitrate (Imdur*) 30 Mg Tabcr      30 TAB PO QAM, TAB 0 Refills         Reported         8/19/09      Medications Reviewed:  No Changes made to meds            IMPRESSION/PLAN      Impression       1) RUL NSCLC:            Mr. Kamron Sanchez presents for 1 year follow up for NSCLC diagnosed in April of 2016. He completed a RUL lobectomy with Dr. Heller at  in [place]. This was a    Stage IB Invasive adenocarcinoma, grade III. He is 4 years out from his     diagnosis now. He does not follow up with Dr. Heller as she has moved her     practice to [place] now.             He is having yearly scans. His last scan in August of 2019 did not show any     local recurrence or mets. He is due for a repeat CT scan.             He has a history of 2 PPD smoking for many years and quit at diagnosis in 2016.             He denies any persistent cough, SOA, or hemoptysis. His physical exam is WNL     today.             We reviewed NCCN guidelines for his stage of lung cancer. At this time, the     recommendation is to perform a low dose CT screening yearly.             2) Thrombocytopenia: Mr. Sanchez has had chronic thrombocytopenia dating back to     July 2017. He was noted to have platelet clumping on previous CBC's. His     platelet count today came back  at 25,000. He denies any bleeding or bruising     noted.             We will repeat lab work tomorrow and draw a blue and purple to evaluate for     platelet clumping and other labs.            Diagnosis      Non-small cell lung cancer (NSCLC)         Non-small cell cancer of right lung - C34.91         Laterality: right            Status post partial lobectomy of lung - Z90.2            Thrombocytopenia - D69.6            Notes      New Diagnostics      * Low Dose Chest CT, SCHEDULED PROCEDURE         Dx: Non-small cell lung cancer (NSCLC) - C34.90      * CBC With Auto Diff, Routine         Dx: Non-small cell lung cancer (NSCLC) - C34.90      * CMP Comp Metabolic Panel, Routine         Dx: Non-small cell lung cancer (NSCLC) - C34.90      * LDH, Routine         Dx: Non-small cell lung cancer (NSCLC) - C34.90      * CBC With Auto Diff, Year         Dx: Non-small cell lung cancer (NSCLC) - C34.90      * CMP Comp Metabolic Panel, Year         Dx: Non-small cell lung cancer (NSCLC) - C34.90      * LDH, Year         Dx: Non-small cell lung cancer (NSCLC) - C34.90      * Low Dose Chest CT, Year         Dx: Non-small cell lung cancer (NSCLC) - C34.90      * CBC With Auto Diff, 1 DAY         Dx: Thrombocytopenia - D69.6      * LDH, 1 DAY         Dx: Thrombocytopenia - D69.6      * Haptoglobin, 1 DAY         Dx: Thrombocytopenia - D69.6      * B12      Dx: Thrombocytopenia - D69.6      * Iron Profile, 1 DAY         Dx: Thrombocytopenia - D69.6      * Ferritin Level, 1 DAY         Dx: Thrombocytopenia - D69.6      * Erythropoietin, 1 DAY         Dx: Thrombocytopenia - D69.6      * Path Review Peripheral Smear, 1 DAY         Dx: Thrombocytopenia - D69.6      * Copper Serum, 1 DAY         Dx: Thrombocytopenia - D69.6      * Zinc  Serum, 1 DAY         Dx: Thrombocytopenia - D69.6      * Sed Rate, 1 DAY         Dx: Thrombocytopenia - D69.6      * Acute Hepatitis Pane, 1 DAY         Dx: Thrombocytopenia - D69.6      * Reticulocyte  Count, Tomorrow         Dx: Thrombocytopenia - D69.6      * Thyroid Profile, 1 DAY         Dx: Thrombocytopenia - D69.6      * SPEP with Immuno (IEPS), 1 DAY         Dx: Thrombocytopenia - D69.6            Plan      1) low dose CT lung screening ASAP and again yearly. Labs in 1 year with CBC,     CMP 1 week prior to 1 year follow up with NP.             2) Labs today: CbC, CMP.             Platelet count of 25,000.             Recheck CBC in AM with purple and blue top tube, along with other labs.             Will call with results of lab work.             Denies any bleeding or bruising at this time.            Pain      Pain Zero Today            Advanced Care Plan Discussion      Declines Discussion [id number]            Patient Education            Platelet Count      Patient Education Provided:  Yes            Electronically signed by KAY EASTMAN onc  08/04/2020 12:37       Disclaimer: Converted document may not contain table formatting or lab diagrams. Please see [facility] for the authenticated document.

## 2021-05-28 NOTE — PROGRESS NOTES
Patient: SMITH,BILLY DUANE     Acct: [id number]     Report: #[id number]  UNIT #: [id number]     : 1950    Encounter Date:2019  PRIMARY CARE: DONALD BHATTI  ***Signed***  --------------------------------------------------------------------------------------------------------------------  NURSE INTAKE      Visit Type      Established Patient Visit            Chief Complaint      LUNG CANCER            Referring Provider/Copies To      Primary Care Provider:  DONALD BHATTI            History and Present Illness      Past Oncology Illness History      -Right upper lobe lobectomy 16:  Staging: pT2aN0. 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 Visceral pleura invasion with     lymphvascular invasion. Margins neg, vascular neg, neg parenchymal. 0 of 15 LN's    negative in [place] with Dr. Heller.             Treatment history:       -16: Patient presented with right shoulder pain and this included a chest     x-ray on  which showed an ill defined 3 cm opacity in the right lung base.           16:  Chest CT showed a 1.3 cm spiculated pleural based nodule anteriorly in     the right upper lobe suspicious for malignancy.  Also noted on the CT scan was     adenopathy adjacent to the GE junction measuring 2 cm and PET scan was     recommended.        2/15/16: PET scan showed hypermetabolic uptake in the right upper lobe pulmonary    nodule with a maximum SUV of 4.6.  There was a small focus of activity in the     subcarinal region that was not significantly above the mediastinal blood pool     activity and was felt to be unlikely to represent metastatic disease.  The area     of suspected adenopathy that was seen near the GE junction on CT was not     hypermetabolic on PET.  There was a sclerotic lesion noted in the right iliac     bone which was also not hypermetabolic and was felt unlikely to represent     metastatic lesion.  The patient was referred to  oncology for further     recommendations. At the time of his initial visit, he was referred for biopsy of    the nodule.  He was complaining of some periodic headaches and MRI brain was     ordered.  There were no findings to suggest intracranial metastasis.  He     underwent biopsy of the RUL lesion on 3/2.  This was positive for adenocarcinoma    and also stained positive for CDX2, raising concern for GI primary.  He was     referred for upper and lower endoscopy and underwent these on 3/10 under the     care of Dr. España.  Pathology from a stomach biopsy showed chronic gastritis     and staining was positive for H pylori.  No evidence of GI malignancy was found     on EGD or colonoscopy.  He was referred to  for determination of his surgical     candidacy and underwent surgery on 4/6 with Dr. Macarena Heller.      -4/6/16:  RUL lobectomy under the care of Dr. Heller. Path: RUL lobectomy -    invasive adenocarcinoma, grade 3 (pT2aN0). -tumor size 2cm -histologic type:     adenocarcinoma -INVASION OF VISCERAL PLEURA. LYMPHOVASCULAR INVASION. Margins: -    bronchial: negative -vascular: negative -parenchymal: negative -distance to fernando    sest margin: 1.8cm (bronchial). Ratio of positive/total nodes: 0/15.       -4/6/16.Adjuvant chemotherapy was recommended due to high risk features.      Declined chemotherapy.       -12/9/16: PET/CT showed postsurgical changes status post interval right upper     lobectomy with removal of right upper lobe cancer. No evidence of local     recurrence or thoracic lymphadenopathy. No PET/CT evidence of metastasis within     neck, abdomen, or pelvis.      -July 2017.  Chest CT showed a decrease in size of the mediastinal and hilar     lymph nodes from 12/09/2016.  No convincing evidence of metastatic disease      -12/27/17: CT chest at the [facility] showed no evidence of cancer.  12    mm subcarinal lymph node was 15 mm previously.            .            HPI - Oncology  Interim      1). Right lung cancer: -Right upper lobe lobectomy 4/6/16:  Staging: pT2aN0.     Path: Invasive adenocarcinoma, grade 3, 2 cm in size. Visceral pleura invasion     with lymphvascular invasion. Margins neg, vascular neg, neg parenchymal. 0 of 15    LN's negative.             He does report he saw his surgeon in 2017 with normal scans with Dr. Heller. He     does report Dr. Heller is no longer at  and recently transferred to [facility]. He was thinking about transferring his care from [place] to [facility]. After discussing his surveillance schedule with Dr. Vargas, it was     decided he only needs to yearly follow up at this point and he is would like to     have his surveillance schedule at this oncology clinic rather than 2 different     locations. In light of his acute onset of headaches, I explained to him we will     get scans of his brain and as long as there are no acute abnormalities and     concern for any worsening metastatic disease. We will check liver function and     LDH today. Since his last scans were in late July of 2018, we will plan on a     yearly scan sometime around late July or early August.             2) Headaches: Reports intermittent headaches worse at nite: mostly occurring on     the right lateral side of scalp for the last 1 1/2 months. This is of moderate     intensity 7/10 pain scale. Reports using Aleve for the pain which does help     intermittently. He denies any vision changes, speech difficulty, falls. We will     check a CT of the head to rule out any metastatic brain disease or any other     abnormalities. I will plan on calling him for follow up on the head CT.            Clinical Trial Participant      No            ECOG Performance Status      0            PAST, FAMILY   Past Medical History      Past Medical History:  Arthritis, Heart Attack (2007), High Cholesterol, Sleep     Apnea      Hematology/Oncology (M):  Lung Cancer            Past  Surgical History      Coronary Stent            Family History      Family History:  Breast Cancer (MOM), Lung Cancer (FATHER)            Social History      Marital Status:        Lives independently:  Yes      Occupation:  retired/army            Tobacco Use      Tobacco status:  Former smoker (sept2016)      Smoking packs/day:  2      Smoking history:  25-50 pack years      Quit status:  Quit date established (1 yr)            Alcohol Use      Alcohol intake:  None            Substance Use      Substance use:  Denies use            REVIEW OF SYSTEMS      General:  Admits: Fatigue;          Denies: Appetite Change, Fever, Night Sweats, Weight Gain, Weight Loss      Eye:  Denies: Blurred Vision, Corrective Lenses, Diplopia, Eye Irritation, Eye     Pain, Eye Redness, Spots in Vision, Vision Loss      ENT:  Denies: Headache, Hearing Loss, Hoarseness, Seizures, Sinus Congestion,     Sore Throat      Cardiovascular:  Denies: Chest Pain, Edema Ankles, Edema Legs, Irregular     Heartbeat, Palpitations      Respiratory:  Denies: Coughing Blood, Productive Cough, Shortness of Air,     Wheezing      Gastrointestinal:  Denies: Bloody Stools, Constipation, Diarrhea, Frequent     Heartburn, Nausea, Problem Swallowing, Tarry Stools, Unable to Control Bowels,     Vomiting      Genitourinary (male):  Denies: Blood in Urine, Decrease Urine Stream, Frequent     Urination, Incontinence, Painful Urination      Musculoskeletal:  Denies: Back Pain, Leg Cramps, Muscle Pain, Muscle Weakness,     Painful Joints, Swollen Joints      Integumentary:  Denies: Bleeds Easily, Bruises Easily, Hair Changes, Jaundice,     Lesions, Mole Changes, Nail Changes, Pigment Changes, Rash, Skin Discoloration      Neurologic:  Denies: Dizziness, Fainting, Numbness\Tingling, Paralysis, Seizures      Psychiatric:  Denies: Anxiety, Confused, Depression, Disoriented, Memory Loss      Endocrine:  Denies: Cold Intolerance, Diabetes, Excessive Sweating,  Excessive     Thirst, Excessive Urination, Heat Intolerance, Flushing, Hyperthyroidism,     Hypothyroidism      Hematologic/Lymphatic:  Denies: Bruising, Bleeding, Enlarged Lymph Nodes,     Recurrent Infections, Transfusions            VITAL SIGNS AND SCORES      Vitals      Height 5 ft 7.00 in / 170.18 cm      Weight 186 lbs 4.619 oz / 84.5 kg      BSA 1.96 m2      BMI 29.2 kg/m2      Temperature 98.4 F / 36.89 C - Temporal      Pulse 70      Respirations 18      Blood Pressure 131/67 Sitting, Right Arm      Pulse Oximetry 98%, RM AIR            Pain Score      Experiencing any pain?:  No      Pain Scale Used:  Numerical      Pain Intensity:  0            Fatigue Score      Experiencing any fatigue?:  Yes      Fatigue (0-10 scale):  4            EXAM      General Appearance:  Positive for: Alert, Oriented x3, Cooperative      Eye:  Positive for: Moist Conjunctiva, PERRLA, Reactive to Light      HEENT:  Positive for: Oropharynx clear;          Negative for: Erythema      Neck:  Positive for: Full ROM, Supple      Respiratory:  Positive for: CTAB, Normal Respiratory Effort      Abdomen/Gastro:  Positive for: Normal Active Bowel Sounds, Soft;          Negative for: Distention, Tenderness      Cardiovascular:  Positive for: RRR;          Negative for: Murmur, Peripheral Edema      Skin:  Positive for: Normal Temperature, Normal Texture and Turgor, Normal Tone;             Negative for: Rash      Psychiatric:  Positive for: AAO X 3, Appropriate Affect, Intact Judgement      Neurologic:  Positive for: Cranial Ner II-XII Intact, Deep Tendon Reflexes,     Headache;          Negative for: Dizziness      Musculoskeletal:  Positive for: Full ROM Lower Extremety, Full ROM Upper     Extremety, Full Muscle Strength, Full Muscle Tone      Lower Extremities:  Positive for: Normal Gait and Station, Pedal Pulses Intact,     Pedal Pulses Symetrical      Upper Extremities:  Negative for: Edema, Weakness      Lymphatic:  Negative for:  Axillary, Cervical, Supraclavicular            PREVENTION      Date Influenza Vaccine Given:  Oct 1, 2016      Influenza Vaccine Declined:  Yes      2 or More Falls Past Year?:  No      Fall Past Year with Injury?:  No      Hx Pneumococcal Vaccination:  No      Encouraged to follow-up with:  PCP regarding preventative exams.      Chart initiated by      TONY CAMARENA [facility]            ALLERGY/MEDS      Allergies      Coded Allergies:             NO KNOWN DRUG ALLERGIES (Verified  Allergy, Unknown, 9/21/18)            Medications      Last Reconciled on 3/22/19 14:00 by KAY EASTMAN      Melatonin (Melatonin) 3 Mg Tablet      3 MG PO HS, TAB         Reported         3/22/19       Diclofenac Sodium (Voltaren 1%*) 100 Gm Gel..gram.      1 APL TOPICAL TID, #1 TUBE         Prov: VIOLETTA PICKERING cfr         5/28/18       Gabapentin (Gabapentin) 300 Mg Capsule      300 MG PO HS, #30 CAP 0 Refills         Reported         1/31/18       MDI-Albuterol (Ventolin HFA) 8 Gm Hfa.aer.ad      2 PUFFS INH Q4-6H PRN for DYSPNEA, #1 INH 6 Refills         Prov: Bang Parkinson         12/13/16       Tiotropium Br/Olodaterol HCl (Stiolto Respimat Inhal Spray) 4 Gm Mist.inhal      2 PUFFS INH RTQDAY, #1 INH 9 Refills         Prov: Bang Parkinson         12/13/16       Cyanocobalamin (Vitamin B-12) 5,000 Mcg Tab.subl      2500 MCG PO QDAY, TAB         Reported         5/9/16       Garlic (Garlic Oil) 1,000 Mg Capsule      1000 MG PO QDAY, CAP         Reported         2/23/16       Nitroglycerin (Nitrostat*) 0.4 Mg Tablet      0.4 MG SL ASDIR, #25 TAB 1 Refill         Reported         2/23/16       Ascorbic Acid (Kerry C*) 500 Mg Tablet      500 TAB PO DAILY         Reported         2/27/12       Calcium/Vitamin D (Citracal-D) 315 Mg Tab      315 TAB PO QDAY         Reported         2/27/12       Atorvastatin Calcium (Lipitor*) 40 Mg Tablet      40 TAB PO QAM, TAB         Reported         2/27/12       Clopidogrel Bisulfate (Plavix) 75 Mg  Tablet      75 TAB PO QDAY, TAB 0 Refills         Reported         8/19/09       Metoprolol Succinate (Toprol XL*) 50 Mg Tabcr      50 TAB PO QAM, TAB 0 Refills         Reported         8/19/09       Isosorbide Mononitrate (Imdur*) 30 Mg Tabcr      30 TAB PO QAM, TAB 0 Refills         Reported         8/19/09      Medications Reviewed:  Changes made to meds            IMPRESSION/PLAN      Impression      1). Right lung cancer: -Right upper lobe lobectomy 4/6/16:  Staging: pT2aN0.     Path: Invasive adenocarcinoma, grade 3, 2 cm in size. Visceral pleura invasion     with lymphvascular invasion. Margins neg, vascular neg, neg parenchymal. 0 of 15    LN's negative.             He does report he saw his surgeon in 2017 with normal scans with Dr. Heller. He     does report Dr. Heller is no longer at  and recently transferred to [facility]. He was thinking about transferring his care from [place] to [facility]. After discussing his surveillance schedule with Dr. Vargas, it was     decided he only needs to yearly follow up at this point and he is would like to     have his surveillance schedule at this oncology clinic rather than 2 different     locations. In light of his acute onset of headaches, I explained to him we will     get scans of his brain and as long as there are no acute abnormalities and     concern for any worsening metastatic disease. We will check liver function and     LDH today. Since his last scans were in late July of 2018, we will plan on a     yearly scan sometime around late July or early August.             2) Headaches: Reports intermittent headaches worse at nite: mostly occurring on     the right lateral side of scalp for the last 1 1/2 months. This is of moderate     intensity 7/10 pain scale. Reports using Aleve for the pain which does help     intermittently. He denies any vision changes, speech difficulty, falls. We will     check a CT of the head to rule out any metastatic  brain disease or any other     abnormalities. I will plan on calling him for follow up on the head CT.            Diagnosis      Adenocarcinoma of right lung, stage 2 - C34.91            Head ache - R51            Notes      New Medications      * Melatonin 3 MG TABLET: 3 MG PO HS      New Diagnostics      * CBC With Auto Diff, Routine         Dx: Adenocarcinoma of right lung, stage 2 - C34.91      * CMP Comp Metabolic Panel, Routine         Dx: Adenocarcinoma of right lung, stage 2 - C34.91      * LDH, Routine         Dx: Adenocarcinoma of right lung, stage 2 - C34.91      * Head W/Wo Cont CT, As Soon As Possible         Dx: Head ache - R51      * CT Abd/Pelvis/Chest W/Contrast, 5 Months         Dx: Adenocarcinoma of right lung, stage 2 - C34.91            Plan      1. CT of CAP first week of August 1 week prior to 5 month appointment. Follow up    with  NP for surveilance appointment in August. Obtain last scan from [facility] for CT of CAP in 2018 and scan to chart please.             2. CT of brain ASAP to rule out any metastatic disease to the brain.            Patient Education      Patient Education Provided:  Yes                 Disclaimer: Converted document may not contain table formatting or lab diagrams. Please see [facility] [facility] for the authenticated document.

## 2021-05-28 NOTE — PROGRESS NOTES
Patient: SMITH,BILLY DUANE     Acct: [id number]     Report: #[id number]  UNIT #: [id number]     : 1950    Encounter Date:2019  PRIMARY CARE: DONALD BHATTI  ***Signed***  --------------------------------------------------------------------------------------------------------------------  NURSE INTAKE      Visit Type      Established Patient Visit            Chief Complaint      LUNG CA            Referring Provider/Copies To      Referring Provider:  DONALD BHATTI      Primary Care Provider:  DONALD BHATTI            History and Present Illness      Past Oncology Illness History      -Right upper lobe lobectomy 16:  Staging: pT2aN0. 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 Visceral pleura invasion with     lymphvascular invasion. Margins neg, vascular neg, neg parenchymal. 0 of 15 LN's    negative in [place] with Dr. Heller.             Treatment history:       -16: Patient presented with right shoulder pain and this included a chest     x-ray on  which showed an ill defined 3 cm opacity in the right lung base.           16:  Chest CT showed a 1.3 cm spiculated pleural based nodule anteriorly in     the right upper lobe suspicious for malignancy.  Also noted on the CT scan was     adenopathy adjacent to the GE junction measuring 2 cm and PET scan was     recommended.        2/15/16: PET scan showed hypermetabolic uptake in the right upper lobe pulmonary    nodule with a maximum SUV of 4.6.  There was a small focus of activity in the     subcarinal region that was not significantly above the mediastinal blood pool     activity and was felt to be unlikely to represent metastatic disease.  The area     of suspected adenopathy that was seen near the GE junction on CT was not     hypermetabolic on PET.  There was a sclerotic lesion noted in the right iliac     bone which was also not hypermetabolic and was felt unlikely to represent     metastatic  lesion.  The patient was referred to oncology for further     recommendations. At the time of his initial visit, he was referred for biopsy of    the nodule.  He was complaining of some periodic headaches and MRI brain was     ordered.  There were no findings to suggest intracranial metastasis.  He     underwent biopsy of the RUL lesion on 3/2.  This was positive for adenocarcinoma    and also stained positive for CDX2, raising concern for GI primary.  He was     referred for upper and lower endoscopy and underwent these on 3/10 under the     care of Dr. España.  Pathology from a stomach biopsy showed chronic gastritis     and staining was positive for H pylori.  No evidence of GI malignancy was found     on EGD or colonoscopy.  He was referred to  for determination of his surgical     candidacy and underwent surgery on 4/6 with Dr. Macarena Heller.      -4/6/16:  RUL lobectomy under the care of Dr. Heller. Path: RUL lobectomy -    invasive adenocarcinoma, grade 3 (pT2aN0). -tumor size 2cm -histologic type:     adenocarcinoma -INVASION OF VISCERAL PLEURA. LYMPHOVASCULAR INVASION. Margins: -    bronchial: negative -vascular: negative -parenchymal: negative -distance to     closest margin: 1.8cm (bronchial). Ratio of positive/total nodes: 0/15.       -4/6/16.Adjuvant chemotherapy was recommended due to high risk features.      Declined chemotherapy.       -12/9/16: PET/CT showed postsurgical changes status post interval right upper     lobectomy with removal of right upper lobe cancer. No evidence of local     recurrence or thoracic lymphadenopathy. No PET/CT evidence of metastasis within     neck, abdomen, or pelvis.      -July 2017.  Chest CT showed a decrease in size of the mediastinal and hilar     lymph nodes from 12/09/2016.  No convincing evidence of metastatic disease      -12/27/17: CT chest at the [facility] showed no evidence of cancer.  12    mm subcarinal lymph node was 15 mm previously.       -8/1/2019: No evidence of local recurrence or metastasis within the CAP.             .            HPI - Oncology Interim      Presents for follow up for right lung adenocarcinoma:             1) Right lung cancer:  Right upper lobe lobectomy: (4/6/16) Path: Invasive adeno    carcinoma, grade 3, 2 cm in size. Visceral pleural invasion with LV invasion.     Margins negative. 0 / 15 LN's negative. Staging: pT2aN0.             Presents for follow up. Last seen in March of 2019 by NP and Dr. Vargas. It     was discussed at the last visit he only needs yearly follow up. He is here to     review his CT scan dated 8/1/19: which shows no evidence of cancer recurrence     nor any acute processes. He denies any cough, SOA, CP, or back pain.             2) Headaches: At his last visit in March, he had complaints of frequent HA's.     Reports no vision changes. A CT of the head did not show any abnormalities. He     continues to have chronic HA's. He reports this is mostly located on the right     side. These have been ongoing. HA's feel like a dull ache. Headaches improve     with lying down. He does report he has not had an eye exam for several years. I     have encouraged to get his eyes examined.            Clinical Trial Participant      No            ECOG Performance Status      0            PAST, FAMILY   Past Medical History      Past Medical History:  Arthritis, Heart Attack (2007), High Cholesterol, Sleep     Apnea      Hematology/Oncology (M):  Lung Cancer            Past Surgical History      Coronary Stent            Family History      Family History:  Breast Cancer (MOM), Lung Cancer (FATHER)            Social History      Marital Status:        Lives independently:  Yes      Occupation:  retired/army            Tobacco Use      Tobacco status:  Former smoker (sept2016)      Smoking packs/day:  2      Smoking history:  25-50 pack years      Quit status:  Quit date established (1 yr)            Alcohol  Use      Alcohol intake:  None            Substance Use      Substance use:  Denies use            REVIEW OF SYSTEMS      General:  Admits: Fatigue;          Denies: Appetite Change, Fever, Night Sweats, Weight Gain, Weight Loss      Eye:  Denies: Blurred Vision, Corrective Lenses, Diplopia, Eye Irritation, Eye     Pain, Eye Redness, Spots in Vision, Vision Loss      ENT:  Denies: Headache, Hearing Loss, Hoarseness, Seizures, Sinus Congestion,     Sore Throat      Cardiovascular:  Denies: Chest Pain, Edema Ankles, Edema Legs, Irregular     Heartbeat, Palpitations      Respiratory:  Denies: Coughing Blood, Productive Cough, Shortness of Air,     Wheezing      Gastrointestinal:  Denies: Bloody Stools, Constipation, Diarrhea, Frequent     Heartburn, Nausea, Problem Swallowing, Tarry Stools, Unable to Control Bowels,     Vomiting      Genitourinary (male):  Denies: Blood in Urine, Decrease Urine Stream, Frequent     Urination, Incontinence, Painful Urination      Musculoskeletal:  Denies: Back Pain, Leg Cramps, Muscle Pain, Muscle Weakness,     Painful Joints, Swollen Joints      Integumentary:  Denies: Bleeds Easily, Bruises Easily, Hair Changes, Jaundice,     Lesions, Mole Changes, Nail Changes, Pigment Changes, Rash, Skin Discoloration      Neurologic:  Denies: Dizziness, Fainting, Numbness\Tingling, Paralysis, Seizures      Psychiatric:  Denies: Anxiety, Confused, Depression, Disoriented, Memory Loss      Endocrine:  Denies: Cold Intolerance, Diabetes, Excessive Sweating, Excessive     Thirst, Excessive Urination, Heat Intolerance, Flushing, Hyperthyroidism,     Hypothyroidism      Hematologic/Lymphatic:  Denies: Bruising, Bleeding, Enlarged Lymph Nodes,     Recurrent Infections, Transfusions            VITAL SIGNS AND SCORES      Vitals      Height 5 ft 7.00 in / 170.18 cm      Weight 171 lbs 4.759 oz / 77.7 kg      BSA 1.89 m2      BMI 26.8 kg/m2      Temperature 97.7 F / 36.5 C - Temporal      Pulse 62      Blood  Pressure 129/77 Sitting, Right Arm      Pulse Oximetry 98%, RM AIR            Pain Score      Experiencing any pain?:  No      Pain Scale Used:  Numerical      Pain Intensity:  0            Fatigue Score      Experiencing any fatigue?:  Yes      Fatigue (0-10 scale):  3            EXAM      General Appearance:  Positive for: Alert, Oriented x3, Cooperative      Eye:  Positive for: Moist Conjunctiva, PERRLA, Reactive to Light      HEENT:  Positive for: Oropharynx clear;          Negative for: Erythema      Neck:  Positive for: Full ROM, Supple      Respiratory:  Positive for: CTAB, Normal Respiratory Effort      Abdomen/Gastro:  Positive for: Normal Active Bowel Sounds, Soft;          Negative for: Distention, Tenderness      Cardiovascular:  Positive for: RRR;          Negative for: Murmur, Peripheral Edema      Skin:  Positive for: Normal Temperature, Normal Texture and Turgor, Normal Tone;             Negative for: Rash      Psychiatric:  Positive for: AAO X 3, Appropriate Affect, Intact Judgement      Neurologic:  Positive for: Cranial Ner II-XII Intact, Deep Tendon Reflexes,     Headache;          Negative for: Dizziness      Genitourinary:  Negative for: Bladder Distention      Musculoskeletal:  Positive for: Full ROM Lower Extremety, Full ROM Upper Extrem    ety, Full Muscle Strength, Full Muscle Tone      Lower Extremities:  Positive for: Normal Gait and Station, Pedal Pulses Intact,     Pedal Pulses Symetrical      Lymphatic:  Negative for: Axillary, Cervical, Supraclavicular            PREVENTION      Hx Influenza Vaccination:  No      Date Influenza Vaccine Given:  Oct 1, 2016      Influenza Vaccine Declined:  Yes      2 or More Falls Past Year?:  No      Fall Past Year with Injury?:  No      Hx Pneumococcal Vaccination:  No      Encouraged to follow-up with:  PCP regarding preventative exams.      Chart initiated by      MARK CASTRO CMA            ALLERGY/MEDS      Allergies      Coded Allergies:              NO KNOWN DRUG ALLERGIES (Verified  Allergy, Unknown, 8/6/19)            Medications      Last Reconciled on 8/6/19 12:47 by KAY EASTMAN      Triamcinolone Acet (Kenalog 0.1% Ointment) 15 Gm Oint...g.      1 APL TOPICAL BID, #15 GM         Prov: YOVANI MONROE CFR         4/19/19       Doxycycline Hyclate (Doxycycline Hyclate*) 100 Mg Capsule      100 MG PO BID for 10 Days, #20 CAP 0 Refills         Prov: YOVANI MONROE CFR         4/19/19       Melatonin (Melatonin) 3 Mg Tablet      3 MG PO HS, TAB         Reported         3/22/19       Gabapentin (Gabapentin) 300 Mg Capsule      300 MG PO HS, #30 CAP 0 Refills         Reported         1/31/18       MDI-Albuterol (Ventolin HFA) 8 Gm Hfa.aer.ad      2 PUFFS INH Q4-6H PRN for DYSPNEA, #1 INH 6 Refills         Prov: Bang Parkinson         12/13/16       Tiotropium Br/Olodaterol HCl (Stiolto Respimat Inhal Spray) 4 Gm Mist.inhal      2 PUFFS INH RTQDAY, #1 INH 9 Refills         Prov: Bang Parkinson         12/13/16       Cyanocobalamin (Vitamin B-12) 5,000 Mcg Tab.subl      2500 MCG PO QDAY, TAB         Reported         5/9/16       Garlic (Garlic Oil) 1,000 Mg Capsule      1000 MG PO QDAY, CAP         Reported         2/23/16       Nitroglycerin (Nitrostat*) 0.4 Mg Tablet      0.4 MG SL ASDIR, #25 TAB 1 Refill         Reported         2/23/16       Ascorbic Acid (Kerry C*) 500 Mg Tablet      500 TAB PO DAILY         Reported         2/27/12       Calcium/Vitamin D (Citracal-D) 315 Mg Tab      315 TAB PO QDAY         Reported         2/27/12       Atorvastatin Calcium (Lipitor*) 40 Mg Tablet      40 TAB PO QAM, TAB         Reported         2/27/12       Clopidogrel Bisulfate (Plavix) 75 Mg Tablet      75 TAB PO QDAY, TAB 0 Refills         Reported         8/19/09       Metoprolol Succinate (Metoprolol Succinate) 50 Mg Tabcr      50 TAB PO QAM, TAB 0 Refills         Reported         8/19/09       Isosorbide Mononitrate (Imdur*) 30 Mg Tabcr      30 TAB PO QAM, TAB 0  Refills         Reported         8/19/09      Medications Reviewed:  No Changes made to meds            IMPRESSION/PLAN      Impression      1) Right lung cancer:  Right upper lobe lobectomy: (4/6/16) Path: Invasive     adenocarcinoma, grade 3, 2 cm in size. Visceral pleural invasion with LV     invasion. Margins negative. 0 / 15 LN's negative. Staging: pT2aN0.             Presents for follow up. Last seen in March of 2019 by NP and Dr. Vargas. It     was discussed at the last visit he only needs yearly follow up. He is here to     review his CT scan dated 8/1/19: which shows no evidence of cancer recurrence     nor any acute processes.             2) Headaches: At his last visit in March, he had complaints of frequent HA's.     Reports no vision changes. A CT of the head did not show any abnormalities. He     continues to have chronic HA's. He reports this is mostly located on the right     side. These have been ongoing. HA's feel like a dull ache. Headaches improve     with lying down. He does report he has not had an eye exam for several years. I     have encouraged to get his eyes examined.             We will obtain an MRI of his brain and refer him to neurology. He will follow up    with ophthalmology for eye exams.            Diagnosis      Head ache - R51            Thrombocytopenia - D69.6            Notes      New Diagnostics      * Brain W/WO Cont MRI, As Soon As Possible         Dx: Head ache - R51      * CBC With Auto Diff, 1 DAY         Dx: Thrombocytopenia - D69.6      * CMP Comp Metabolic Panel, Routine         Dx: Thrombocytopenia - D69.6      * LDH, Routine         Dx: Thrombocytopenia - D69.6      * B12      Dx: Thrombocytopenia - D69.6      New Referrals      * Neurology, As Soon As Possible         [facility]UROLOGY         Dx: Head ache - R51            Plan      1) Repeat scan in 1 year with CT chest. Follow up with NP.             2) MRI of brain. Referral to neurology for chronic HA's.              3) Follow up for eye exams.              Call with any questions or concerns.            Patient Education      Patient Education Provided:  Yes            Topics Patient Counseled on      chronic headaches, neuro consult      eye exams            Alcohol Counseling      Counseling given:  Provider counseling                 Disclaimer: Converted document may not contain table formatting or lab diagrams. Please see [employer] for the authenticated document.

## 2021-07-06 ENCOUNTER — OFFICE VISIT (OUTPATIENT)
Dept: FAMILY MEDICINE CLINIC | Facility: CLINIC | Age: 71
End: 2021-07-06

## 2021-07-06 VITALS
WEIGHT: 176 LBS | DIASTOLIC BLOOD PRESSURE: 63 MMHG | HEART RATE: 62 BPM | HEIGHT: 70 IN | TEMPERATURE: 96.8 F | BODY MASS INDEX: 25.2 KG/M2 | SYSTOLIC BLOOD PRESSURE: 123 MMHG | OXYGEN SATURATION: 96 %

## 2021-07-06 DIAGNOSIS — J43.9 PULMONARY EMPHYSEMA, UNSPECIFIED EMPHYSEMA TYPE (HCC): ICD-10-CM

## 2021-07-06 DIAGNOSIS — M54.81 CERVICO-OCCIPITAL NEURALGIA: ICD-10-CM

## 2021-07-06 DIAGNOSIS — R73.01 IMPAIRED FASTING GLUCOSE: ICD-10-CM

## 2021-07-06 DIAGNOSIS — E78.5 HYPERLIPIDEMIA, UNSPECIFIED HYPERLIPIDEMIA TYPE: ICD-10-CM

## 2021-07-06 DIAGNOSIS — I10 HYPERTENSION, UNSPECIFIED TYPE: Primary | ICD-10-CM

## 2021-07-06 DIAGNOSIS — J30.2 SEASONAL ALLERGIES: ICD-10-CM

## 2021-07-06 DIAGNOSIS — Z79.899 MEDICATION MANAGEMENT: ICD-10-CM

## 2021-07-06 DIAGNOSIS — M50.30 DDD (DEGENERATIVE DISC DISEASE), CERVICAL: ICD-10-CM

## 2021-07-06 DIAGNOSIS — I25.10 CORONARY ARTERY DISEASE INVOLVING NATIVE HEART WITHOUT ANGINA PECTORIS, UNSPECIFIED VESSEL OR LESION TYPE: ICD-10-CM

## 2021-07-06 PROBLEM — C34.90 LUNG CANCER: Status: ACTIVE | Noted: 2021-07-06

## 2021-07-06 PROBLEM — M47.812 CERVICAL SPONDYLOSIS WITHOUT MYELOPATHY: Status: ACTIVE | Noted: 2021-07-06

## 2021-07-06 PROBLEM — B19.20 HEPATITIS C: Status: ACTIVE | Noted: 2021-07-06

## 2021-07-06 PROBLEM — C80.1 CANCER: Status: ACTIVE | Noted: 2021-07-06

## 2021-07-06 PROBLEM — J98.4 LUNG DISEASE: Status: ACTIVE | Noted: 2021-07-06

## 2021-07-06 PROBLEM — R06.02 SHORTNESS OF BREATH: Status: ACTIVE | Noted: 2021-07-06

## 2021-07-06 PROBLEM — I21.9 HEART ATTACK (HCC): Status: ACTIVE | Noted: 2021-07-06

## 2021-07-06 PROBLEM — K75.9 HEPATITIS: Status: ACTIVE | Noted: 2021-07-06

## 2021-07-06 PROBLEM — G44.86 CERVICOGENIC HEADACHE: Status: ACTIVE | Noted: 2019-08-09

## 2021-07-06 LAB
ALBUMIN SERPL-MCNC: 4.1 G/DL (ref 3.5–5.2)
ALBUMIN/GLOB SERPL: 1.6 G/DL
ALP SERPL-CCNC: 94 U/L (ref 39–117)
ALT SERPL W P-5'-P-CCNC: 22 U/L (ref 1–41)
AMPHET+METHAMPHET UR QL: NEGATIVE
ANION GAP SERPL CALCULATED.3IONS-SCNC: 7.8 MMOL/L (ref 5–15)
AST SERPL-CCNC: 21 U/L (ref 1–40)
BARBITURATES UR QL SCN: NEGATIVE
BENZODIAZ UR QL SCN: NEGATIVE
BILIRUB SERPL-MCNC: 0.3 MG/DL (ref 0–1.2)
BUN SERPL-MCNC: 20 MG/DL (ref 8–23)
BUN/CREAT SERPL: 16.5 (ref 7–25)
BURR CELLS BLD QL SMEAR: ABNORMAL
CALCIUM SPEC-SCNC: 8.4 MG/DL (ref 8.6–10.5)
CANNABINOIDS SERPL QL: NEGATIVE
CHLORIDE SERPL-SCNC: 104 MMOL/L (ref 98–107)
CHOLEST SERPL-MCNC: 120 MG/DL (ref 0–200)
CO2 SERPL-SCNC: 26.2 MMOL/L (ref 22–29)
COCAINE UR QL: NEGATIVE
CREAT SERPL-MCNC: 1.21 MG/DL (ref 0.76–1.27)
DEPRECATED RDW RBC AUTO: 45.6 FL (ref 37–54)
EOSINOPHIL # BLD MANUAL: 0.25 10*3/MM3 (ref 0–0.4)
EOSINOPHIL NFR BLD MANUAL: 3.1 % (ref 0.3–6.2)
ERYTHROCYTE [DISTWIDTH] IN BLOOD BY AUTOMATED COUNT: 12.8 % (ref 12.3–15.4)
GFR SERPL CREATININE-BSD FRML MDRD: 59 ML/MIN/1.73
GLOBULIN UR ELPH-MCNC: 2.5 GM/DL
GLUCOSE SERPL-MCNC: 102 MG/DL (ref 65–99)
HBA1C MFR BLD: 5.9 % (ref 4.8–5.6)
HCT VFR BLD AUTO: 42.2 % (ref 37.5–51)
HDLC SERPL-MCNC: 35 MG/DL (ref 40–60)
HGB BLD-MCNC: 14.1 G/DL (ref 13–17.7)
LDLC SERPL CALC-MCNC: 64 MG/DL (ref 0–100)
LDLC/HDLC SERPL: 1.78 {RATIO}
LYMPHOCYTES # BLD MANUAL: 3.23 10*3/MM3 (ref 0.7–3.1)
LYMPHOCYTES NFR BLD MANUAL: 12.2 % (ref 5–12)
LYMPHOCYTES NFR BLD MANUAL: 39.8 % (ref 19.6–45.3)
MCH RBC QN AUTO: 31.6 PG (ref 26.6–33)
MCHC RBC AUTO-ENTMCNC: 33.4 G/DL (ref 31.5–35.7)
MCV RBC AUTO: 94.6 FL (ref 79–97)
METHADONE UR QL SCN: NEGATIVE
MONOCYTES # BLD AUTO: 0.97 10*3/MM3 (ref 0.1–0.9)
NEUTROPHILS # BLD AUTO: 3.48 10*3/MM3 (ref 1.7–7)
NEUTROPHILS NFR BLD MANUAL: 43.9 % (ref 42.7–76)
OPIATES UR QL: NEGATIVE
OXYCODONE UR QL SCN: NEGATIVE
PLAT MORPH BLD: NORMAL
PLATELET # BLD AUTO: 101 10*3/MM3 (ref 140–450)
PMV BLD AUTO: 12.7 FL (ref 6–12)
POIKILOCYTOSIS BLD QL SMEAR: ABNORMAL
POTASSIUM SERPL-SCNC: 4.7 MMOL/L (ref 3.5–5.2)
PROT SERPL-MCNC: 6.6 G/DL (ref 6–8.5)
RBC # BLD AUTO: 4.46 10*6/MM3 (ref 4.14–5.8)
SMUDGE CELLS BLD QL SMEAR: ABNORMAL
SODIUM SERPL-SCNC: 138 MMOL/L (ref 136–145)
TRIGL SERPL-MCNC: 114 MG/DL (ref 0–150)
TSH SERPL DL<=0.05 MIU/L-ACNC: 2.77 UIU/ML (ref 0.27–4.2)
VARIANT LYMPHS NFR BLD MANUAL: 1 % (ref 0–5)
VLDLC SERPL-MCNC: 21 MG/DL (ref 5–40)
WBC # BLD AUTO: 7.92 10*3/MM3 (ref 3.4–10.8)

## 2021-07-06 PROCEDURE — 80307 DRUG TEST PRSMV CHEM ANLYZR: CPT | Performed by: NURSE PRACTITIONER

## 2021-07-06 PROCEDURE — 85007 BL SMEAR W/DIFF WBC COUNT: CPT | Performed by: NURSE PRACTITIONER

## 2021-07-06 PROCEDURE — 83036 HEMOGLOBIN GLYCOSYLATED A1C: CPT | Performed by: NURSE PRACTITIONER

## 2021-07-06 PROCEDURE — 80061 LIPID PANEL: CPT | Performed by: NURSE PRACTITIONER

## 2021-07-06 PROCEDURE — 36415 COLL VENOUS BLD VENIPUNCTURE: CPT | Performed by: NURSE PRACTITIONER

## 2021-07-06 PROCEDURE — 84443 ASSAY THYROID STIM HORMONE: CPT | Performed by: NURSE PRACTITIONER

## 2021-07-06 PROCEDURE — 80053 COMPREHEN METABOLIC PANEL: CPT | Performed by: NURSE PRACTITIONER

## 2021-07-06 PROCEDURE — 85025 COMPLETE CBC W/AUTO DIFF WBC: CPT | Performed by: NURSE PRACTITIONER

## 2021-07-06 PROCEDURE — 99214 OFFICE O/P EST MOD 30 MIN: CPT | Performed by: NURSE PRACTITIONER

## 2021-07-06 RX ORDER — GABAPENTIN 300 MG/1
CAPSULE ORAL
COMMUNITY
Start: 2021-03-02 | End: 2021-07-06 | Stop reason: SDUPTHER

## 2021-07-06 RX ORDER — FLUOROURACIL 50 MG/G
CREAM TOPICAL
COMMUNITY

## 2021-07-06 RX ORDER — ISOSORBIDE MONONITRATE 60 MG/1
TABLET, EXTENDED RELEASE ORAL
COMMUNITY
Start: 2021-05-29

## 2021-07-06 RX ORDER — ALBUTEROL SULFATE 90 UG/1
AEROSOL, METERED RESPIRATORY (INHALATION)
COMMUNITY

## 2021-07-06 RX ORDER — CETIRIZINE HYDROCHLORIDE 10 MG/1
TABLET ORAL
COMMUNITY
End: 2021-08-05 | Stop reason: SDUPTHER

## 2021-07-06 RX ORDER — TRAMADOL HYDROCHLORIDE 50 MG/1
TABLET ORAL
COMMUNITY
End: 2021-08-05 | Stop reason: SDUPTHER

## 2021-07-06 RX ORDER — BENZONATATE 100 MG/1
CAPSULE ORAL
COMMUNITY
End: 2021-08-05 | Stop reason: SDUPTHER

## 2021-07-06 RX ORDER — ATORVASTATIN CALCIUM 40 MG/1
TABLET, FILM COATED ORAL
COMMUNITY
Start: 2021-05-29 | End: 2023-01-06

## 2021-07-06 RX ORDER — NITROGLYCERIN 0.3 MG/1
TABLET SUBLINGUAL
COMMUNITY

## 2021-07-06 RX ORDER — CLOPIDOGREL BISULFATE 75 MG/1
TABLET ORAL
COMMUNITY
Start: 2021-05-29 | End: 2023-01-06

## 2021-07-06 RX ORDER — GABAPENTIN 300 MG/1
300 CAPSULE ORAL 3 TIMES DAILY
Qty: 270 CAPSULE | Refills: 1 | Status: SHIPPED | OUTPATIENT
Start: 2021-07-06 | End: 2022-01-06 | Stop reason: SDUPTHER

## 2021-07-06 RX ORDER — METOPROLOL SUCCINATE 50 MG/1
TABLET, EXTENDED RELEASE ORAL
COMMUNITY
Start: 2021-05-29

## 2021-07-06 NOTE — PROGRESS NOTES
f     Follow Up Office Visit      Patient Name: Kamron Sanchez  : 1950   MRN: [id number]     Chief Complaint:    Chief Complaint   Patient presents with   • Follow-up   • Hypertension   • Hyperlipidemia   • Allergies       History of Present Illness: Kamron Sanchez is a 70 y.o. male who is here today to follow up for   F/U-HTN,Hyperlipidemia,emphysema    Coloscopy-  Cardiology dr david aldridge stent placed 2007   Dermatology dr pricila longoria [facility]/oc cancer [facility] center     Subjective      Review of Systems:   Review of Systems   Constitutional: Negative for chills and fever.   HENT: Negative for ear pain, postnasal drip and sore throat.    Eyes: Negative for itching.   Respiratory: Positive for shortness of breath. Negative for cough.    Cardiovascular: Negative for chest pain.   Gastrointestinal: Negative for abdominal pain, diarrhea, nausea and vomiting.   Genitourinary: Negative for dysuria.   Musculoskeletal: Positive for neck pain.   Skin: Negative for rash.   Allergic/Immunologic: Positive for environmental allergies.   Neurological: Positive for headaches. Negative for light-headedness.        Past Medical History:   Past Medical History:   Diagnosis Date   • Abnormal blood chemistry 2014    Elevated RBC count   • Arthritis 2014   • Arthropathy, unspecified     multiple sites   • Cancer (CMS/HCC)    • Cervicogenic headache 2019   • Emphysema of lung (CMS/HCC) 2016   • Heart attack (CMS/HCC)    • Hepatitis C    • Hyperlipemia    • Hyperlipidemia 2014   • Hypertension 2014   • Impaired fasting glucose 2014   • Lung cancer (CMS/HCC)    • Lung disease    • Lung nodule seen on imaging study 2016   • Myocardial infarction (CMS/HCC)    • Shortness of breath    • Tobacco abuse 2016       Past Surgical History:   Past Surgical History:   Procedure Laterality Date   • CARDIAC SURGERY     • CARDIAC VALVE SURGERY     • COLONOSCOPY     •  CORONARY STENT PLACEMENT     • FINGER SURGERY     • HAND SURGERY     • HERNIA REPAIR     • SKIN CANCER EXCISION     • TONSILLECTOMY         Family History:   Family History   Problem Relation Age of Onset   • Cancer Mother    • Diabetes Father    • Arthritis Father    • Cancer Father    • Stroke Other    • Breast cancer Other        Social History:   Social History     Socioeconomic History   • Marital status:      Spouse name: Not on file   • Number of children: Not on file   • Years of education: Not on file   • Highest education level: Not on file   Tobacco Use   • Smoking status: Former Smoker     Packs/day: 2.00     Years: 31.00     Pack years: 62.00     Start date:      Quit date:      Years since quittin.5   • Smokeless tobacco: Never Used   Substance and Sexual Activity   • Alcohol use: Never   • Drug use: Never   • Sexual activity: Defer       Medications:     Current Outpatient Medications:   •  gabapentin (NEURONTIN) 300 MG capsule, gabapentin 300 mg oral capsule take 1 capsule (300 mg) by oral route 3 times per day for 90 days 3/2/2021  Active, Disp: , Rfl:   •  albuterol sulfate HFA (ProAir HFA) 108 (90 Base) MCG/ACT inhaler, ProAir HFA 90 mcg/actuation aerosol inhaler, Disp: , Rfl:   •  ascorbic acid (VITAMIN C) 1000 MG tablet, Vitamin C 1,000 mg oral tablet take 1 tablet by oral route daily   Active, Disp: , Rfl:   •  atorvastatin (LIPITOR) 40 MG tablet, , Disp: , Rfl:   •  benzonatate (TESSALON) 100 MG capsule, benzonatate 100 mg capsule, Disp: , Rfl:   •  cetirizine (zyrTEC) 10 MG tablet, cetirizine 10 mg tablet, Disp: , Rfl:   •  clopidogrel (PLAVIX) 75 MG tablet, , Disp: , Rfl:   •  fluorouracil (EFUDEX) 5 % cream, fluorouracil 5 % topical cream apply a sufficient amount to cover the lesions in the affected area(s) by topical route 2 times per day   Active, Disp: , Rfl:   •  GARLIC PO, garlic 1,000 mg oral capsule take 1 capsule by oral route daily   Active, Disp: , Rfl:   •   "isosorbide mononitrate (IMDUR) 60 MG 24 hr tablet, , Disp: , Rfl:   •  metoprolol succinate XL (TOPROL-XL) 50 MG 24 hr tablet, , Disp: , Rfl:   •  nitroglycerin (NITROSTAT) 0.3 MG SL tablet, nitroglycerin 0.3 mg sublingual tablet, sublingual place 1 tablet (0.3 mg) by sublingual route at the first sign of an attack; no more than 3 tabs are recommended within a 15 minute period.   Active, Disp: , Rfl:   •  traMADol (ULTRAM) 50 MG tablet, tramadol 50 mg tablet, Disp: , Rfl:     Allergies:   Not on File        PHQ-2 Total Score: 0   PHQ-9 Total Score: 0     Objective     Physical Exam:  Vital Signs:   Vitals:    07/06/21 0707   BP: 123/63   Pulse: 62   Temp: 96.8 °F (36 °C)   SpO2: 96%   Weight: 79.8 kg (176 lb)   Height: 177.8 cm (70\")     Body mass index is 25.25 kg/m².     Physical Exam  HENT:      Head: Normocephalic.      Right Ear: Tympanic membrane normal.      Left Ear: Tympanic membrane normal.      Nose: Nose normal.      Mouth/Throat:      Pharynx: Oropharynx is clear.   Eyes:      Conjunctiva/sclera: Conjunctivae normal.      Pupils: Pupils are equal, round, and reactive to light.   Neck:      Vascular: No carotid bruit.   Cardiovascular:      Rate and Rhythm: Normal rate and regular rhythm.      Pulses: Normal pulses.   Pulmonary:      Effort: Pulmonary effort is normal.      Breath sounds: Normal breath sounds.   Abdominal:      General: Abdomen is flat.      Palpations: Abdomen is soft.   Musculoskeletal:      Cervical back: Neck supple.      Right lower leg: No edema.      Left lower leg: No edema.   Skin:     General: Skin is warm and dry.      Capillary Refill: Capillary refill takes less than 2 seconds.   Neurological:      Mental Status: He is alert and oriented to person, place, and time.   Psychiatric:         Mood and Affect: Mood normal.         Behavior: Behavior normal.             Assessment / Plan      Assessment/Plan:   Diagnoses and all orders for this visit:    1. Hypertension, " "unspecified type (Primary)    2. Hyperlipidemia, unspecified hyperlipidemia type    3. Impaired fasting glucose    4. DDD (degenerative disc disease), cervical    5. Cervico-occipital neuralgia    6. Pulmonary emphysema, unspecified emphysema type (CMS/HCC)    7. Seasonal allergies    8. Medication management    9. Coronary artery disease involving native heart without angina pectoris, unspecified vessel or lesion type       HTN currently controlled  Hyperlipidemia pt denies myalgia, will obtain cmp and lipid panel to monitor statin  Impaired fasting glucose will obtain hgb a1c, decrease carb intake  Neck pain controlled with gabapentin, will refill, kedar reviewed, UDS obtained  Emphysema stable  Seasonal allergies controlled at this time  Cad pt to follow up cardiology, statin and plavix for clot prevention, nitrostat prn chest pain, none at this time       Follow Up:   Return in about 6 months (around 1/6/2022).    Julienne Harrison, APRHILDA    \"Please note that portions of this note were completed with a voice recognition program.\"    "

## 2021-07-26 DIAGNOSIS — C34.90 MALIGNANT NEOPLASM OF LUNG, UNSPECIFIED LATERALITY, UNSPECIFIED PART OF LUNG (HCC): Primary | ICD-10-CM

## 2021-07-29 ENCOUNTER — CLINICAL SUPPORT (OUTPATIENT)
Dept: ONCOLOGY | Facility: HOSPITAL | Age: 71
End: 2021-07-29

## 2021-07-29 DIAGNOSIS — C34.90 MALIGNANT NEOPLASM OF LUNG, UNSPECIFIED LATERALITY, UNSPECIFIED PART OF LUNG (HCC): ICD-10-CM

## 2021-07-29 DIAGNOSIS — D69.6 THROMBOCYTOPENIA (HCC): Primary | ICD-10-CM

## 2021-07-29 LAB
ALBUMIN SERPL-MCNC: 3.89 G/DL (ref 3.5–5.2)
ALBUMIN/GLOB SERPL: 1.5 G/DL
ALP SERPL-CCNC: 104 U/L (ref 39–117)
ALT SERPL W P-5'-P-CCNC: 14 U/L (ref 1–41)
ANION GAP SERPL CALCULATED.3IONS-SCNC: 6.3 MMOL/L (ref 5–15)
AST SERPL-CCNC: 16 U/L (ref 1–40)
BASOPHILS # BLD AUTO: 0.03 10*3/MM3 (ref 0–0.2)
BASOPHILS NFR BLD AUTO: 0.4 % (ref 0–1.5)
BILIRUB SERPL-MCNC: 0.6 MG/DL (ref 0–1.2)
BUN SERPL-MCNC: 13 MG/DL (ref 8–23)
BUN/CREAT SERPL: 13.7 (ref 7–25)
CALCIUM SPEC-SCNC: 8.6 MG/DL (ref 8.6–10.5)
CHLORIDE SERPL-SCNC: 103 MMOL/L (ref 98–107)
CO2 SERPL-SCNC: 24.7 MMOL/L (ref 22–29)
CREAT SERPL-MCNC: 0.95 MG/DL (ref 0.76–1.27)
DEPRECATED RDW RBC AUTO: 46.4 FL (ref 37–54)
EOSINOPHIL # BLD AUTO: 0.16 10*3/MM3 (ref 0–0.4)
EOSINOPHIL NFR BLD AUTO: 1.9 % (ref 0.3–6.2)
ERYTHROCYTE [DISTWIDTH] IN BLOOD BY AUTOMATED COUNT: 13.1 % (ref 12.3–15.4)
GFR SERPL CREATININE-BSD FRML MDRD: 78 ML/MIN/1.73
GLOBULIN UR ELPH-MCNC: 2.6 GM/DL
GLUCOSE SERPL-MCNC: 200 MG/DL (ref 65–99)
HCT VFR BLD AUTO: 40.2 % (ref 37.5–51)
HGB BLD-MCNC: 13.4 G/DL (ref 13–17.7)
IMM GRANULOCYTES # BLD AUTO: 0.01 10*3/MM3 (ref 0–0.05)
IMM GRANULOCYTES NFR BLD AUTO: 0.1 % (ref 0–0.5)
LDH SERPL-CCNC: 203 U/L (ref 135–225)
LYMPHOCYTES # BLD AUTO: 4.07 10*3/MM3 (ref 0.7–3.1)
LYMPHOCYTES NFR BLD AUTO: 49.5 % (ref 19.6–45.3)
MCH RBC QN AUTO: 31.8 PG (ref 26.6–33)
MCHC RBC AUTO-ENTMCNC: 33.3 G/DL (ref 31.5–35.7)
MCV RBC AUTO: 95.3 FL (ref 79–97)
MONOCYTES # BLD AUTO: 1.05 10*3/MM3 (ref 0.1–0.9)
MONOCYTES NFR BLD AUTO: 12.8 % (ref 5–12)
NEUTROPHILS NFR BLD AUTO: 2.9 10*3/MM3 (ref 1.7–7)
NEUTROPHILS NFR BLD AUTO: 35.3 % (ref 42.7–76)
PLATELET # BLD AUTO: 45 10*3/MM3 (ref 140–450)
PMV BLD AUTO: 13.2 FL (ref 6–12)
POTASSIUM SERPL-SCNC: 3.8 MMOL/L (ref 3.5–5.2)
PROT SERPL-MCNC: 6.5 G/DL (ref 6–8.5)
RBC # BLD AUTO: 4.22 10*6/MM3 (ref 4.14–5.8)
SODIUM SERPL-SCNC: 134 MMOL/L (ref 136–145)
WBC # BLD AUTO: 8.22 10*3/MM3 (ref 3.4–10.8)

## 2021-07-29 PROCEDURE — 83615 LACTATE (LD) (LDH) ENZYME: CPT

## 2021-07-29 PROCEDURE — 80053 COMPREHEN METABOLIC PANEL: CPT

## 2021-07-29 PROCEDURE — 36415 COLL VENOUS BLD VENIPUNCTURE: CPT

## 2021-07-29 PROCEDURE — 85025 COMPLETE CBC W/AUTO DIFF WBC: CPT

## 2021-08-05 ENCOUNTER — OFFICE VISIT (OUTPATIENT)
Dept: ONCOLOGY | Facility: HOSPITAL | Age: 71
End: 2021-08-05

## 2021-08-05 ENCOUNTER — LAB (OUTPATIENT)
Dept: ONCOLOGY | Facility: HOSPITAL | Age: 71
End: 2021-08-05

## 2021-08-05 VITALS
BODY MASS INDEX: 25.53 KG/M2 | RESPIRATION RATE: 18 BRPM | TEMPERATURE: 96.6 F | OXYGEN SATURATION: 99 % | HEART RATE: 65 BPM | WEIGHT: 177.91 LBS | DIASTOLIC BLOOD PRESSURE: 55 MMHG | SYSTOLIC BLOOD PRESSURE: 122 MMHG

## 2021-08-05 DIAGNOSIS — D69.6 THROMBOCYTOPENIA (HCC): ICD-10-CM

## 2021-08-05 DIAGNOSIS — C34.90 MALIGNANT NEOPLASM OF LUNG, UNSPECIFIED LATERALITY, UNSPECIFIED PART OF LUNG (HCC): Primary | ICD-10-CM

## 2021-08-05 LAB
ANISOCYTOSIS BLD QL: ABNORMAL
DEPRECATED RDW RBC AUTO: 46.5 FL (ref 37–54)
EOSINOPHIL # BLD MANUAL: 0.39 10*3/MM3 (ref 0–0.4)
EOSINOPHIL NFR BLD MANUAL: 4 % (ref 0.3–6.2)
ERYTHROCYTE [DISTWIDTH] IN BLOOD BY AUTOMATED COUNT: 13.2 % (ref 12.3–15.4)
HCT VFR BLD AUTO: 41.4 % (ref 37.5–51)
HGB BLD-MCNC: 13.7 G/DL (ref 13–17.7)
LARGE PLATELETS: ABNORMAL
LYMPHOCYTES # BLD MANUAL: 4.56 10*3/MM3 (ref 0.7–3.1)
LYMPHOCYTES NFR BLD MANUAL: 30 % (ref 19.6–45.3)
LYMPHOCYTES NFR BLD MANUAL: 6 % (ref 5–12)
MCH RBC QN AUTO: 31.5 PG (ref 26.6–33)
MCHC RBC AUTO-ENTMCNC: 33.1 G/DL (ref 31.5–35.7)
MCV RBC AUTO: 95.2 FL (ref 79–97)
MONOCYTES # BLD AUTO: 0.58 10*3/MM3 (ref 0.1–0.9)
NEUTROPHILS # BLD AUTO: 4.18 10*3/MM3 (ref 1.7–7)
NEUTROPHILS NFR BLD MANUAL: 43 % (ref 42.7–76)
NRBC BLD AUTO-RTO: 0 /100 WBC (ref 0–0.2)
PATHOLOGY REVIEW: YES
PLATELET # BLD AUTO: 96 10*3/MM3 (ref 140–450)
PMV BLD AUTO: 13.9 FL (ref 6–12)
RBC # BLD AUTO: 4.35 10*6/MM3 (ref 4.14–5.8)
SMALL PLATELETS BLD QL SMEAR: ABNORMAL
SMUDGE CELLS BLD QL SMEAR: ABNORMAL
VARIANT LYMPHS NFR BLD MANUAL: 17 % (ref 0–5)
WBC # BLD AUTO: 9.71 10*3/MM3 (ref 3.4–10.8)

## 2021-08-05 PROCEDURE — 85007 BL SMEAR W/DIFF WBC COUNT: CPT

## 2021-08-05 PROCEDURE — G0463 HOSPITAL OUTPT CLINIC VISIT: HCPCS | Performed by: NURSE PRACTITIONER

## 2021-08-05 PROCEDURE — 99213 OFFICE O/P EST LOW 20 MIN: CPT | Performed by: NURSE PRACTITIONER

## 2021-08-05 PROCEDURE — 85025 COMPLETE CBC W/AUTO DIFF WBC: CPT

## 2021-08-05 NOTE — PROGRESS NOTES
Chief Complaint  Lung Cancer and Follow-up    Christy, Garrett An*  Christy, Garrett Dianne, APRN      Subjective          Kamron Sanchez presents to [facility] for surveilance for NSCLC 1 year.      History of Present Illness     Mr. Kamron Sanchez presents for 1 year follow up for surveilance for RUL NSCLC. He is status post RUL lobectomy in April 2016 and completed his surgery at  with Dr. Heller. Staging of 1B NSCLC. Has yearly scans. He is does not have an order for CT scan and this will be scheduled. He is a former smoker.     He also completed lab work on 7/29/21 which shows low platelet count of 45 on CBC. Previous history of prior thromobcytopenia with platelet clumping in the past. He denies any bleeding of any sorts or persistent brusing. The rest of the CBC is normal. He reports he has been taking oral B-12. His last B-12 level was in the thousand range. I told him to stop taking the oral B-12.     He reports he has had both of his COVID vaccines.     Cancer Staging  Stage 1B, grade 3, NSCLC RUL: 4/2016.      Treatment intent: curative    Oncology/Hematology History    No history exists.    1) RUL NSCLC: -Right upper lobe lobectomy 4/6/16:  Staging: pT2aN0. Path:     Invasive adenocarcinoma, grade 3, 2 cm in size. Visceral pleura invasion with     lymphvascular invasion. Margins neg, vascular neg, neg parenchymal. 0 of 15 LN's    negative in [place] with Dr. Heller.             Treatment history:       -1/26/16: Patient presented with right shoulder pain and this included a chest     x-ray on 01/26 which showed an ill defined 3 cm opacity in the right lung base.           2/8/16:  Chest CT showed a 1.3 cm spiculated pleural based nodule anteriorly in     the right upper lobe suspicious for malignancy.  Also noted on the CT scan was     adenopathy adjacent to the GE junction measuring 2 cm and PET scan was     recommended.        2/15/16: PET scan showed  hypermetabolic uptake in the right upper lobe pulmonary    nodule with a maximum SUV of 4.6.  There was a small focus of activity in the jesus    bcarinal region that was not significantly above the mediastinal blood pool     activity and was felt to be unlikely to represent metastatic disease.  The area     of suspected adenopathy that was seen near the GE junction on CT was not     hypermetabolic on PET.  There was a sclerotic lesion noted in the right iliac     bone which was also not hypermetabolic and was felt unlikely to represent met    astatic lesion.  The patient was referred to oncology for further     recommendations. At the time of his initial visit, he was referred for biopsy of    the nodule.  He was complaining of some periodic headaches and MRI brain was     ordered.  There were no findings to suggest intracranial metastasis.  He     underwent biopsy of the RUL lesion on 3/2.  This was positive for adenocarcinoma    and also stained positive for CDX2, raising concern for GI primary.  He was     referred for upper and lower endoscopy and underwent these on 3/10 under the     care of Dr. España.  Pathology from a stomach biopsy showed chronic gastritis     and staining was positive for H pylori.  No evidence of GI malignancy was found     on EGD or colonoscopy.  He was referred to  for determination of his surgical     candidacy and underwent surgery on 4/6 with Dr. Macarena Heller.      -4/6/16:  RUL lobectomy under the care of Dr. Heller. Path: RUL lobectomy -    invasive adenocarcinoma, grade 3 (pT2aN0). -tumor size 2cm -histologic type:     adenocarcinoma -INVASION OF VISCERAL PLEURA. LYMPHOVASCULAR INVASION. Margins: -    bronchial: negative -vascular: negative -parenchymal: negative -distance to     closest margin: 1.8cm (bronchial). Ratio of positive/total nodes: 0/15.       -4/6/16.Adjuvant chemotherapy was recommended due to high risk features.      Declined chemotherapy.       -12/9/16: PET/CT  showed postsurgical changes status post interval right upper     lobectomy with removal of right upper lobe cancer. No evidence of local     recurrence or thoracic lymphadenopathy. No PET/CT evidence of metastasis within     neck, abdomen, or pelvis.      -July 2017.  Chest CT showed a decrease in size of the mediastinal and hilar     lymph nodes from 12/09/2016.  No convincing evidence of metastatic disease      -12/27/17: CT chest at the [facility] showed no evidence of cancer.  12    mm subcarinal lymph node was 15 mm previously.      -8/1/2019: No evidence of local recurrence or metastasis within the CAP.    8/2020: CT chest: No evidence of recurrence or metastasis.               Review of Systems   Constitutional: Negative for appetite change, diaphoresis, fatigue, fever, unexpected weight gain and unexpected weight loss.   HENT: Negative for hearing loss, mouth sores, sore throat, swollen glands, trouble swallowing and voice change.    Eyes: Negative for blurred vision.   Respiratory: Positive for cough and shortness of breath. Negative for wheezing.    Cardiovascular: Positive for chest pain. Negative for palpitations.   Gastrointestinal: Negative for abdominal pain, blood in stool, constipation, diarrhea, nausea and vomiting.   Endocrine: Negative for cold intolerance and heat intolerance.   Genitourinary: Negative for difficulty urinating, dysuria, frequency, hematuria and urinary incontinence.   Musculoskeletal: Negative for arthralgias, back pain and myalgias.   Skin: Negative for rash, skin lesions and bruise.   Neurological: Positive for headache. Negative for dizziness, seizures, weakness and numbness.   Hematological: Does not bruise/bleed easily.   Psychiatric/Behavioral: Negative for depressed mood. The patient is not nervous/anxious.        Current Outpatient Medications on File Prior to Visit   Medication Sig Dispense Refill   • albuterol sulfate HFA (ProAir HFA) 108 (90 Base) MCG/ACT  inhaler ProAir HFA 90 mcg/actuation aerosol inhaler     • ascorbic acid (VITAMIN C) 1000 MG tablet Vitamin C 1,000 mg oral tablet take 1 tablet by oral route daily   Active     • atorvastatin (LIPITOR) 40 MG tablet      • clopidogrel (PLAVIX) 75 MG tablet      • fluorouracil (EFUDEX) 5 % cream fluorouracil 5 % topical cream apply a sufficient amount to cover the lesions in the affected area(s) by topical route 2 times per day   Active     • gabapentin (NEURONTIN) 300 MG capsule Take 1 capsule by mouth 3 (Three) Times a Day. 270 capsule 1   • GARLIC PO garlic 1,000 mg oral capsule take 1 capsule by oral route daily   Active     • isosorbide mononitrate (IMDUR) 60 MG 24 hr tablet      • metoprolol succinate XL (TOPROL-XL) 50 MG 24 hr tablet      • nitroglycerin (NITROSTAT) 0.3 MG SL tablet nitroglycerin 0.3 mg sublingual tablet, sublingual place 1 tablet (0.3 mg) by sublingual route at the first sign of an attack; no more than 3 tabs are recommended within a 15 minute period.   Active     • vitamin D3 125 MCG (5000 UT) capsule capsule Take 1,000 Units by mouth Daily.     • [DISCONTINUED] benzonatate (TESSALON) 100 MG capsule benzonatate 100 mg capsule     • [DISCONTINUED] cetirizine (zyrTEC) 10 MG tablet cetirizine 10 mg tablet     • [DISCONTINUED] traMADol (ULTRAM) 50 MG tablet tramadol 50 mg tablet       No current facility-administered medications on file prior to visit.       No Known Allergies  Past Medical History:   Diagnosis Date   • Abnormal blood chemistry 09/17/2014    Elevated RBC count   • Arthritis 09/16/2014   • Arthropathy, unspecified     multiple sites   • Cancer (CMS/HCC)    • Cervicogenic headache 08/09/2019   • Emphysema of lung (CMS/HCC) 02/09/2016   • Heart attack (CMS/HCC)    • Hepatitis C    • Hyperlipemia    • Hyperlipidemia 09/16/2014   • Hypertension 09/16/2014   • Impaired fasting glucose 09/17/2014   • Lung cancer (CMS/HCC)    • Lung disease    • Lung nodule seen on imaging study  2016   • Myocardial infarction (CMS/HCC)    • Shortness of breath    • Tobacco abuse 2016     Past Surgical History:   Procedure Laterality Date   • CARDIAC SURGERY     • CARDIAC VALVE SURGERY     • COLONOSCOPY     • CORONARY STENT PLACEMENT     • FINGER SURGERY     • HAND SURGERY     • HERNIA REPAIR     • SKIN CANCER EXCISION     • TONSILLECTOMY       Social History     Socioeconomic History   • Marital status:      Spouse name: Not on file   • Number of children: Not on file   • Years of education: Not on file   • Highest education level: Not on file   Tobacco Use   • Smoking status: Former Smoker     Packs/day: 2.00     Years: 31.00     Pack years: 62.00     Start date:      Quit date:      Years since quittin.5   • Smokeless tobacco: Never Used   Substance and Sexual Activity   • Alcohol use: Never   • Drug use: Never   • Sexual activity: Defer     Family History   Problem Relation Age of Onset   • Cancer Mother    • Diabetes Father    • Arthritis Father    • Cancer Father    • Stroke Other    • Breast cancer Other      Immunization History   Administered Date(s) Administered   • Influenza, Unspecified 2021   • Pneumococcal Polysaccharide (PPSV23) 2021   • Tdap 2018       Objective   Physical Exam  Vitals and nursing note reviewed.   Constitutional:       Appearance: Normal appearance. He is normal weight.   HENT:      Head: Normocephalic.      Nose: Nose normal.      Mouth/Throat:      Mouth: Mucous membranes are moist.      Pharynx: Oropharynx is clear.   Eyes:      Conjunctiva/sclera: Conjunctivae normal.      Pupils: Pupils are equal, round, and reactive to light.   Cardiovascular:      Rate and Rhythm: Normal rate and regular rhythm.      Pulses: Normal pulses.      Heart sounds: Normal heart sounds. No murmur heard.     Pulmonary:      Effort: Pulmonary effort is normal.      Breath sounds: Normal breath sounds. No wheezing or rhonchi.   Abdominal:       General: Bowel sounds are normal.      Palpations: Abdomen is soft.      Tenderness: There is no abdominal tenderness.   Musculoskeletal:         General: Normal range of motion.      Cervical back: Normal range of motion and neck supple.      Right lower leg: No edema.      Left lower leg: No edema.   Skin:     General: Skin is warm and dry.      Capillary Refill: Capillary refill takes less than 2 seconds.      Findings: No bruising or rash.   Neurological:      General: No focal deficit present.      Mental Status: He is alert and oriented to person, place, and time. Mental status is at baseline.      Motor: No weakness.   Psychiatric:         Mood and Affect: Mood normal.         Behavior: Behavior normal.         Thought Content: Thought content normal.         Judgment: Judgment normal.         Vitals:    08/05/21 1000   BP: 122/55  Comment: LT ARM   Pulse: 65   Resp: 18   Temp: 96.6 °F (35.9 °C)   TempSrc: Temporal   SpO2: 99%  Comment: RM AIR   Weight: 80.7 kg (177 lb 14.6 oz)   PainSc: 0-No pain              ECOG: (0) Fully Active - Able to Carry On All Pre-disease Performance Without Restriction  Fall Risk Assessment was completed, and patient is at low risk for falls.  PHQ-9 Total Score: 0       The patient is not  experiencing fatigue. Fatigue score: 0    PT/OT Functional Screening: PT fx screen: No needs identified  Speech Functional Screening: Speech fx screen: No needs identified  Rehab to be ordered: Rehab to be ordered: No needs identified and Patient declined        Result Review :   The following data was reviewed by: GLORIA Hutchins on 08/05/2021:  Lab Results   Component Value Date    HGB 13.4 07/29/2021    HCT 40.2 07/29/2021    MCV 95.3 07/29/2021    PLT 45 (C) 07/29/2021    WBC 8.22 07/29/2021    NEUTROABS 2.90 07/29/2021    LYMPHSABS 4.07 (H) 07/29/2021    MONOSABS 1.05 (H) 07/29/2021    EOSABS 0.16 07/29/2021    BASOSABS 0.03 07/29/2021     Lab Results   Component Value Date     GLUCOSE 200 (H) 07/29/2021    BUN 13 07/29/2021    CREATININE 0.95 07/29/2021     (L) 07/29/2021    K 3.8 07/29/2021     07/29/2021    CO2 24.7 07/29/2021    CALCIUM 8.6 07/29/2021    PROTEINTOT 6.5 07/29/2021    ALBUMIN 3.89 07/29/2021    BILITOT 0.6 07/29/2021    ALKPHOS 104 07/29/2021    AST 16 07/29/2021    ALT 14 07/29/2021          Assessment and Plan    Diagnoses and all orders for this visit:    1. Malignant neoplasm of lung, unspecified laterality, unspecified part of lung (CMS/HCC) (Primary)  -     CT Chest With Contrast; Future    2. Thrombocytopenia (CMS/HCC)  -     CBC & Differential; Future  -     Peripheral Blood Smear; Future    Other orders  -     Cancel: CT Chest Low Dose Wo; Future    1) He is 5 years out from diagnosis now and appears to be doing well. We will check a CT of chest to evaluate for any lung cancer recurrence yearly.      2) CBC in blue top tube and purple top tube to evaluate for any platelet clumping.   Peripheral smear to evaluate for platelet clumping and thrombocytopenia. Previous history of platelet clumping on previous smear and history of thrombocytopenia.     Spoke to hematology lab and ran CBC off of both tube. Blue top tube is 82 on platelet count and 96 on purple top tube. No evidence of platelet clumping is exist today. They are sending smear for path review.     Follow up in 2 weeks by telehealth to discuss CT chest and lab work.       Patient Follow Up: 2-3 weeks after CT chest and lab work.      Patient was given instructions and counseling regarding his condition or for health maintenance advice. Please see specific information pulled into the AVS if appropriate.     Jie Churchill, APRN    8/5/2021

## 2021-08-05 NOTE — PATIENT INSTRUCTIONS
CT chest in 1 week for restaging of lung cancer.     CBC in blue top and purple top today to evaluate for platelet clumping.     Peripheral smear today.     Follow up in 2 weeks by telehealth to review results.

## 2021-08-09 LAB
CYTO UR: NORMAL
LAB AP CASE REPORT: NORMAL
LAB AP CLINICAL INFORMATION: NORMAL
PATH REPORT.FINAL DX SPEC: NORMAL
PATH REPORT.GROSS SPEC: NORMAL

## 2021-08-15 PROCEDURE — U0005 INFEC AGEN DETEC AMPLI PROBE: HCPCS | Performed by: FAMILY MEDICINE

## 2021-08-15 PROCEDURE — U0003 INFECTIOUS AGENT DETECTION BY NUCLEIC ACID (DNA OR RNA); SEVERE ACUTE RESPIRATORY SYNDROME CORONAVIRUS 2 (SARS-COV-2) (CORONAVIRUS DISEASE [COVID-19]), AMPLIFIED PROBE TECHNIQUE, MAKING USE OF HIGH THROUGHPUT TECHNOLOGIES AS DESCRIBED BY CMS-2020-01-R: HCPCS | Performed by: FAMILY MEDICINE

## 2021-08-17 ENCOUNTER — HOSPITAL ENCOUNTER (OUTPATIENT)
Dept: CT IMAGING | Facility: HOSPITAL | Age: 71
Discharge: HOME | End: 2021-08-17
Admitting: NURSE PRACTITIONER

## 2021-08-17 DIAGNOSIS — C34.90 MALIGNANT NEOPLASM OF LUNG, UNSPECIFIED LATERALITY, UNSPECIFIED PART OF LUNG (HCC): ICD-10-CM

## 2021-08-17 PROCEDURE — 0 IOPAMIDOL PER 1 ML: Performed by: NURSE PRACTITIONER

## 2021-08-17 PROCEDURE — 71260 CT THORAX DX C+: CPT

## 2021-08-17 PROCEDURE — 71260 CT THORAX DX C+: CPT | Performed by: RADIOLOGY

## 2021-08-17 RX ADMIN — IOPAMIDOL 100 ML: 755 INJECTION, SOLUTION INTRAVENOUS at 11:10

## 2021-08-19 ENCOUNTER — OFFICE VISIT (OUTPATIENT)
Dept: ONCOLOGY | Facility: HOSPITAL | Age: 71
End: 2021-08-19

## 2021-08-19 DIAGNOSIS — C34.90 NON-SMALL CELL LUNG CANCER, UNSPECIFIED LATERALITY (HCC): Primary | ICD-10-CM

## 2021-08-19 DIAGNOSIS — D69.6 THROMBOCYTOPENIA (HCC): ICD-10-CM

## 2021-08-19 PROCEDURE — 99441 PR PHYS/QHP TELEPHONE EVALUATION 5-10 MIN: CPT | Performed by: NURSE PRACTITIONER

## 2021-08-19 NOTE — PROGRESS NOTES
"Chief Complaint  Lung Cancer (-telehealth)   You have chosen to receive care through a telephone visit. Do you consent to use a telephone visit for your medical care today? \"Yes\"}    This visit has been rescheduled as a phone visit to comply with patient safety concerns in accordance with [facility] recommendations. Total time of discussion was 8 minutes.      Garrett Harrison*  Garrett Harrison APRN      Subjective          Kamron Sanchez presents to [facility] to review his CT scans and lab.     History of Present Illness    Mr. Kamron Sanchez presents in telehealth visit today to review his recent CT scans of the chest. He has history of RUL NSCLC in January of 2016 and is status post RUL lobectomy. We discussed his most recent scans dated 8/2021 and again are negative for any cancer recurrence. He does have some moderate emphysema, but is now a non-smoker. He does have some thoracic degenerative changes likely secondary to osteoarthritis.     He does have a history of thrombocytopenia noted on CBC and was noted previously to have platelet clumping in the past on periopheral smear. Recent CBC from July of 2021 shows platelet count of 45,000. At his last visit on 8/5/21, platelet count was noted to be in the upper 90's. He denies any bleeding or bruising noted.     He feels well overall. I have encouraged him to call for any questions or concerns.     Cancer Staging  No matching staging information was found for the patient.     Treatment intent: curative    Oncology/Hematology History    No history exists.   1) RUL NSCLC: -Right upper lobe lobectomy 4/6/16:  Staging: pT2aN0. Path:     Invasive adenocarcinoma, grade 3, 2 cm in size. Visceral pleura invasion with     lymphvascular invasion. Margins neg, vascular neg, neg parenchymal. 0 of 15 LN's    negative in [place] with Dr. Heller.             Treatment history:       -1/26/16: Patient presented with right shoulder " pain and this included a chest     x-ray on 01/26 which showed an ill defined 3 cm opacity in the right lung base.           2/8/16:  Chest CT showed a 1.3 cm spiculated pleural based nodule anteriorly in     the right upper lobe suspicious for malignancy.  Also noted on the CT scan was     adenopathy adjacent to the GE junction measuring 2 cm and PET scan was     recommended.        2/15/16: PET scan showed hypermetabolic uptake in the right upper lobe pulmonary    nodule with a maximum SUV of 4.6.  There was a small focus of activity in the jesus    bcarinal region that was not significantly above the mediastinal blood pool     activity and was felt to be unlikely to represent metastatic disease.  The area     of suspected adenopathy that was seen near the GE junction on CT was not     hypermetabolic on PET.  There was a sclerotic lesion noted in the right iliac     bone which was also not hypermetabolic and was felt unlikely to represent met    astatic lesion.  The patient was referred to oncology for further     recommendations. At the time of his initial visit, he was referred for biopsy of    the nodule.  He was complaining of some periodic headaches and MRI brain was     ordered.  There were no findings to suggest intracranial metastasis.  He     underwent biopsy of the RUL lesion on 3/2.  This was positive for adenocarcinoma    and also stained positive for CDX2, raising concern for GI primary.  He was     referred for upper and lower endoscopy and underwent these on 3/10 under the     care of Dr. España.  Pathology from a stomach biopsy showed chronic gastritis     and staining was positive for H pylori.  No evidence of GI malignancy was found     on EGD or colonoscopy.  He was referred to  for determination of his surgical     candidacy and underwent surgery on 4/6 with Dr. Macarena Heller.      -4/6/16:  RUL lobectomy under the care of Dr. Heller. Path: RUL lobectomy -    invasive adenocarcinoma, grade 3  (pT2aN0). -tumor size 2cm -histologic type:     adenocarcinoma -INVASION OF VISCERAL PLEURA. LYMPHOVASCULAR INVASION. Margins: -    bronchial: negative -vascular: negative -parenchymal: negative -distance to     closest margin: 1.8cm (bronchial). Ratio of positive/total nodes: 0/15.       -4/6/16.Adjuvant chemotherapy was recommended due to high risk features.      Declined chemotherapy.       -12/9/16: PET/CT showed postsurgical changes status post interval right upper     lobectomy with removal of right upper lobe cancer. No evidence of local     recurrence or thoracic lymphadenopathy. No PET/CT evidence of metastasis within     neck, abdomen, or pelvis.      -July 2017.  Chest CT showed a decrease in size of the mediastinal and hilar     lymph nodes from 12/09/2016.  No convincing evidence of metastatic disease      -12/27/17: CT chest at the [facility] showed no evidence of cancer.  12    mm subcarinal lymph node was 15 mm previously.      -8/1/2019: No evidence of local recurrence or metastasis within the CAP.    8/2020: CT chest: No evidence of recurrence or metastasis.     8/2021: CT chest: no evidence of recurrence or metastasis.           Review of Systems   Constitutional: Negative for appetite change, diaphoresis, fatigue, fever, unexpected weight gain and unexpected weight loss.   HENT: Negative for hearing loss, mouth sores, sore throat, swollen glands, trouble swallowing and voice change.    Eyes: Negative for blurred vision.   Respiratory: Negative for cough, shortness of breath and wheezing.    Cardiovascular: Negative for chest pain and palpitations.   Gastrointestinal: Negative for abdominal pain, blood in stool, constipation, diarrhea, nausea and vomiting.   Endocrine: Negative for cold intolerance and heat intolerance.   Genitourinary: Negative for difficulty urinating, dysuria, frequency, hematuria and urinary incontinence.   Musculoskeletal: Negative for arthralgias, back pain and  myalgias.   Skin: Negative for rash, skin lesions and bruise.   Neurological: Negative for dizziness, seizures, weakness, numbness and headache.   Hematological: Does not bruise/bleed easily.   Psychiatric/Behavioral: Negative for depressed mood. The patient is not nervous/anxious.    All other systems reviewed and are negative.      Current Outpatient Medications on File Prior to Visit   Medication Sig Dispense Refill   • albuterol sulfate HFA (ProAir HFA) 108 (90 Base) MCG/ACT inhaler ProAir HFA 90 mcg/actuation aerosol inhaler     • ascorbic acid (VITAMIN C) 1000 MG tablet Vitamin C 1,000 mg oral tablet take 1 tablet by oral route daily   Active     • atorvastatin (LIPITOR) 40 MG tablet      • clopidogrel (PLAVIX) 75 MG tablet      • fluorouracil (EFUDEX) 5 % cream fluorouracil 5 % topical cream apply a sufficient amount to cover the lesions in the affected area(s) by topical route 2 times per day   Active     • gabapentin (NEURONTIN) 300 MG capsule Take 1 capsule by mouth 3 (Three) Times a Day. 270 capsule 1   • GARLIC PO garlic 1,000 mg oral capsule take 1 capsule by oral route daily   Active     • isosorbide mononitrate (IMDUR) 60 MG 24 hr tablet      • metoprolol succinate XL (TOPROL-XL) 50 MG 24 hr tablet      • nitroglycerin (NITROSTAT) 0.3 MG SL tablet nitroglycerin 0.3 mg sublingual tablet, sublingual place 1 tablet (0.3 mg) by sublingual route at the first sign of an attack; no more than 3 tabs are recommended within a 15 minute period.   Active     • vitamin D3 125 MCG (5000 UT) capsule capsule Take 1,000 Units by mouth Daily.       No current facility-administered medications on file prior to visit.       No Known Allergies  Past Medical History:   Diagnosis Date   • Abnormal blood chemistry 09/17/2014    Elevated RBC count   • Arthritis 09/16/2014   • Arthropathy, unspecified     multiple sites   • Cancer (CMS/[facility])    • Cervicogenic headache 08/09/2019   • Emphysema of lung (CMS/[facility]) 02/09/2016   •  Heart attack (CMS/HCC)    • Hepatitis C    • Hyperlipemia    • Hyperlipidemia 2014   • Hypertension 2014   • Impaired fasting glucose 2014   • Lung cancer (CMS/HCC)    • Lung disease    • Lung nodule seen on imaging study 2016   • Myocardial infarction (CMS/HCC)    • Shortness of breath    • Tobacco abuse 2016     Past Surgical History:   Procedure Laterality Date   • CARDIAC SURGERY     • CARDIAC VALVE SURGERY     • COLONOSCOPY     • CORONARY STENT PLACEMENT     • FINGER SURGERY     • HAND SURGERY     • HERNIA REPAIR     • SKIN CANCER EXCISION     • TONSILLECTOMY       Social History     Socioeconomic History   • Marital status:      Spouse name: Not on file   • Number of children: Not on file   • Years of education: Not on file   • Highest education level: Not on file   Tobacco Use   • Smoking status: Former Smoker     Packs/day: 2.00     Years: 31.00     Pack years: 62.00     Start date:      Quit date:      Years since quittin.6   • Smokeless tobacco: Never Used   Vaping Use   • Vaping Use: Never used   Substance and Sexual Activity   • Alcohol use: Never   • Drug use: Never   • Sexual activity: Defer     Family History   Problem Relation Age of Onset   • Cancer Mother    • Diabetes Father    • Arthritis Father    • Cancer Father    • Stroke Other    • Breast cancer Other      Immunization History   Administered Date(s) Administered   • Influenza, Unspecified 2021   • Pneumococcal Polysaccharide (PPSV23) 2021   • Tdap 2018       Objective   Physical Exam  Vitals and nursing note reviewed.     No physical exam completed due to telehealth visit.     There were no vitals filed for this visit.  ECOG score:  (telehealth)         ECOG: (0) Fully Active - Able to Carry On All Pre-disease Performance Without Restriction  Fall Risk Assessment was completed, and patient is at low risk for falls.  PHQ-9 Total Score:         The patient is not  experiencing  fatigue. Fatigue score: 0    PT/OT Functional Screening: PT fx screen: No needs identified  Speech Functional Screening: Speech fx screen: No needs identified  Rehab to be ordered: Rehab to be ordered: No needs identified        Result Review :   The following data was reviewed by: GLORIA Hutchins on 08/19/2021:  Lab Results   Component Value Date    HGB 13.7 08/05/2021    HCT 41.4 08/05/2021    MCV 95.2 08/05/2021    PLT 96 (L) 08/05/2021    WBC 9.71 08/05/2021    NEUTROABS 4.18 08/05/2021    LYMPHSABS 4.07 (H) 07/29/2021    MONOSABS 1.05 (H) 07/29/2021    EOSABS 0.39 08/05/2021    BASOSABS 0.03 07/29/2021     Lab Results   Component Value Date    GLUCOSE 200 (H) 07/29/2021    BUN 13 07/29/2021    CREATININE 0.95 07/29/2021     (L) 07/29/2021    K 3.8 07/29/2021     07/29/2021    CO2 24.7 07/29/2021    CALCIUM 8.6 07/29/2021    PROTEINTOT 6.5 07/29/2021    ALBUMIN 3.89 07/29/2021    BILITOT 0.6 07/29/2021    ALKPHOS 104 07/29/2021    AST 16 07/29/2021    ALT 14 07/29/2021          Assessment and Plan    Diagnoses and all orders for this visit:    1. Non-small cell lung cancer, unspecified laterality (CMS/HCC) (Primary)  -     CT Chest With Contrast; Future    2. Thrombocytopenia (CMS/HCC)  -     CBC & Differential; Future  -     Comprehensive Metabolic Panel; Future      1. Repeat CT chest in 1 year for lung cancer surveillance.     2) Repeat labs in 1 year with CBC in blue top tube as well as purple top tube secondary to platelet clumping. CMP in 1 year. Labs can be done on same day as CT scan, please.     He was instructed to call for any concerns including worsening SOA, productive cough, CP, HA, or weight loss, bruising or bleeding.         Patient Follow Up: 1 year with NP.     Patient was given instructions and counseling regarding his condition or for health maintenance advice. Please see specific information pulled into the AVS if appropriate.     Jie Churchill,  APRN    8/19/2021

## 2021-08-19 NOTE — PATIENT INSTRUCTIONS
Labs in 1 year. Draw CBC in blue top and lavender top tube. Patient with history of platelet clumping in the past on prior labs.     CT chest in 1 year for NSCLC surveillance.     Follow up with NP in 1 year.

## 2022-01-06 ENCOUNTER — OFFICE VISIT (OUTPATIENT)
Dept: FAMILY MEDICINE CLINIC | Facility: CLINIC | Age: 72
End: 2022-01-06

## 2022-01-06 VITALS
DIASTOLIC BLOOD PRESSURE: 78 MMHG | WEIGHT: 176 LBS | SYSTOLIC BLOOD PRESSURE: 130 MMHG | BODY MASS INDEX: 24.64 KG/M2 | HEART RATE: 65 BPM | HEIGHT: 71 IN | OXYGEN SATURATION: 98 % | TEMPERATURE: 97.9 F

## 2022-01-06 DIAGNOSIS — R73.01 IMPAIRED FASTING GLUCOSE: ICD-10-CM

## 2022-01-06 DIAGNOSIS — M54.81 CERVICO-OCCIPITAL NEURALGIA: ICD-10-CM

## 2022-01-06 DIAGNOSIS — J30.2 SEASONAL ALLERGIES: ICD-10-CM

## 2022-01-06 DIAGNOSIS — I25.10 CORONARY ARTERY DISEASE INVOLVING NATIVE HEART WITHOUT ANGINA PECTORIS, UNSPECIFIED VESSEL OR LESION TYPE: ICD-10-CM

## 2022-01-06 DIAGNOSIS — E78.2 MIXED HYPERLIPIDEMIA: ICD-10-CM

## 2022-01-06 DIAGNOSIS — I10 PRIMARY HYPERTENSION: Primary | ICD-10-CM

## 2022-01-06 DIAGNOSIS — M50.30 DDD (DEGENERATIVE DISC DISEASE), CERVICAL: ICD-10-CM

## 2022-01-06 DIAGNOSIS — J43.9 PULMONARY EMPHYSEMA, UNSPECIFIED EMPHYSEMA TYPE: ICD-10-CM

## 2022-01-06 DIAGNOSIS — R39.12 BENIGN PROSTATIC HYPERPLASIA WITH WEAK URINARY STREAM: ICD-10-CM

## 2022-01-06 DIAGNOSIS — Z13.29 SCREENING FOR THYROID DISORDER: ICD-10-CM

## 2022-01-06 DIAGNOSIS — F51.01 PRIMARY INSOMNIA: ICD-10-CM

## 2022-01-06 DIAGNOSIS — N40.1 BENIGN PROSTATIC HYPERPLASIA WITH WEAK URINARY STREAM: ICD-10-CM

## 2022-01-06 DIAGNOSIS — Z79.899 MEDICATION MANAGEMENT: ICD-10-CM

## 2022-01-06 LAB
ALBUMIN SERPL-MCNC: 3.9 G/DL (ref 3.5–5.2)
ALBUMIN/GLOB SERPL: 1.7 G/DL
ALP SERPL-CCNC: 90 U/L (ref 39–117)
ALT SERPL W P-5'-P-CCNC: 14 U/L (ref 1–41)
AMPHET+METHAMPHET UR QL: NEGATIVE
ANION GAP SERPL CALCULATED.3IONS-SCNC: 7.4 MMOL/L (ref 5–15)
AST SERPL-CCNC: 13 U/L (ref 1–40)
BACTERIA UR QL AUTO: NORMAL /HPF
BARBITURATES UR QL SCN: NEGATIVE
BENZODIAZ UR QL SCN: NEGATIVE
BILIRUB SERPL-MCNC: 0.4 MG/DL (ref 0–1.2)
BILIRUB UR QL STRIP: NEGATIVE
BUN SERPL-MCNC: 18 MG/DL (ref 8–23)
BUN/CREAT SERPL: 18.9 (ref 7–25)
BURR CELLS BLD QL SMEAR: ABNORMAL
CALCIUM SPEC-SCNC: 8.9 MG/DL (ref 8.6–10.5)
CANNABINOIDS SERPL QL: NEGATIVE
CHLORIDE SERPL-SCNC: 103 MMOL/L (ref 98–107)
CHOLEST SERPL-MCNC: 120 MG/DL (ref 0–200)
CLARITY UR: CLEAR
CO2 SERPL-SCNC: 28.6 MMOL/L (ref 22–29)
COCAINE UR QL: NEGATIVE
COLOR UR: YELLOW
CREAT SERPL-MCNC: 0.95 MG/DL (ref 0.76–1.27)
DEPRECATED RDW RBC AUTO: 45.9 FL (ref 37–54)
ERYTHROCYTE [DISTWIDTH] IN BLOOD BY AUTOMATED COUNT: 12.8 % (ref 12.3–15.4)
GFR SERPL CREATININE-BSD FRML MDRD: 78 ML/MIN/1.73
GLOBULIN UR ELPH-MCNC: 2.3 GM/DL
GLUCOSE SERPL-MCNC: 112 MG/DL (ref 65–99)
GLUCOSE UR STRIP-MCNC: NEGATIVE MG/DL
HBA1C MFR BLD: 5.96 % (ref 4.8–5.6)
HCT VFR BLD AUTO: 42.2 % (ref 37.5–51)
HDLC SERPL-MCNC: 46 MG/DL (ref 40–60)
HGB BLD-MCNC: 13.7 G/DL (ref 13–17.7)
HGB UR QL STRIP.AUTO: NEGATIVE
HYALINE CASTS UR QL AUTO: NORMAL /LPF
KETONES UR QL STRIP: NEGATIVE
LDLC SERPL CALC-MCNC: 59 MG/DL (ref 0–100)
LDLC/HDLC SERPL: 1.3 {RATIO}
LEUKOCYTE ESTERASE UR QL STRIP.AUTO: ABNORMAL
LYMPHOCYTES # BLD MANUAL: 6.57 10*3/MM3 (ref 0.7–3.1)
LYMPHOCYTES NFR BLD MANUAL: 7.3 % (ref 5–12)
MCH RBC QN AUTO: 31.4 PG (ref 26.6–33)
MCHC RBC AUTO-ENTMCNC: 32.5 G/DL (ref 31.5–35.7)
MCV RBC AUTO: 96.6 FL (ref 79–97)
METHADONE UR QL SCN: NEGATIVE
MONOCYTES # BLD: 0.73 10*3/MM3 (ref 0.1–0.9)
NEUTROPHILS # BLD AUTO: 2.72 10*3/MM3 (ref 1.7–7)
NEUTROPHILS NFR BLD MANUAL: 27.1 % (ref 42.7–76)
NITRITE UR QL STRIP: NEGATIVE
NRBC BLD AUTO-RTO: 0.1 /100 WBC (ref 0–0.2)
NRBC SPEC MANUAL: 1 /100 WBC (ref 0–0.2)
OPIATES UR QL: NEGATIVE
OXYCODONE UR QL SCN: NEGATIVE
PH UR STRIP.AUTO: 7 [PH] (ref 5–8)
PLAT MORPH BLD: NORMAL
PLATELET # BLD AUTO: 102 10*3/MM3 (ref 140–450)
PMV BLD AUTO: 12.9 FL (ref 6–12)
POIKILOCYTOSIS BLD QL SMEAR: ABNORMAL
POTASSIUM SERPL-SCNC: 4.5 MMOL/L (ref 3.5–5.2)
PROT SERPL-MCNC: 6.2 G/DL (ref 6–8.5)
PROT UR QL STRIP: NEGATIVE
PSA SERPL-MCNC: 0.98 NG/ML (ref 0–4)
RBC # BLD AUTO: 4.37 10*6/MM3 (ref 4.14–5.8)
RBC # UR STRIP: NORMAL /HPF
REF LAB TEST METHOD: NORMAL
SMUDGE CELLS BLD QL SMEAR: ABNORMAL
SODIUM SERPL-SCNC: 139 MMOL/L (ref 136–145)
SP GR UR STRIP: 1.02 (ref 1–1.03)
SQUAMOUS #/AREA URNS HPF: NORMAL /HPF
TRIGL SERPL-MCNC: 70 MG/DL (ref 0–150)
TSH SERPL DL<=0.05 MIU/L-ACNC: 2.65 UIU/ML (ref 0.27–4.2)
UROBILINOGEN UR QL STRIP: ABNORMAL
VARIANT LYMPHS NFR BLD MANUAL: 3.1 % (ref 0–5)
VARIANT LYMPHS NFR BLD MANUAL: 62.5 % (ref 19.6–45.3)
VLDLC SERPL-MCNC: 15 MG/DL (ref 5–40)
WBC # UR STRIP: NORMAL /HPF
WBC NRBC COR # BLD: 10.02 10*3/MM3 (ref 3.4–10.8)

## 2022-01-06 PROCEDURE — 80053 COMPREHEN METABOLIC PANEL: CPT | Performed by: NURSE PRACTITIONER

## 2022-01-06 PROCEDURE — 80307 DRUG TEST PRSMV CHEM ANLYZR: CPT | Performed by: NURSE PRACTITIONER

## 2022-01-06 PROCEDURE — 81001 URINALYSIS AUTO W/SCOPE: CPT | Performed by: NURSE PRACTITIONER

## 2022-01-06 PROCEDURE — 85007 BL SMEAR W/DIFF WBC COUNT: CPT | Performed by: NURSE PRACTITIONER

## 2022-01-06 PROCEDURE — 85025 COMPLETE CBC W/AUTO DIFF WBC: CPT | Performed by: NURSE PRACTITIONER

## 2022-01-06 PROCEDURE — 80061 LIPID PANEL: CPT | Performed by: NURSE PRACTITIONER

## 2022-01-06 PROCEDURE — 84443 ASSAY THYROID STIM HORMONE: CPT | Performed by: NURSE PRACTITIONER

## 2022-01-06 PROCEDURE — 83036 HEMOGLOBIN GLYCOSYLATED A1C: CPT | Performed by: NURSE PRACTITIONER

## 2022-01-06 PROCEDURE — 84153 ASSAY OF PSA TOTAL: CPT | Performed by: NURSE PRACTITIONER

## 2022-01-06 PROCEDURE — 99214 OFFICE O/P EST MOD 30 MIN: CPT | Performed by: NURSE PRACTITIONER

## 2022-01-06 RX ORDER — TAMSULOSIN HYDROCHLORIDE 0.4 MG/1
1 CAPSULE ORAL DAILY
Qty: 90 CAPSULE | Refills: 1 | Status: SHIPPED | OUTPATIENT
Start: 2022-01-06 | End: 2022-07-06 | Stop reason: SDUPTHER

## 2022-01-06 RX ORDER — GABAPENTIN 300 MG/1
300 CAPSULE ORAL 3 TIMES DAILY
Qty: 270 CAPSULE | Refills: 1 | Status: SHIPPED | OUTPATIENT
Start: 2022-01-06 | End: 2022-07-06 | Stop reason: SDUPTHER

## 2022-01-06 NOTE — PROGRESS NOTES
Follow Up Office Visit      Patient Name: Kamron Sanchez  : 1950   MRN: [id number]     Chief Complaint:    Chief Complaint   Patient presents with   • Hypertension   • Hyperlipidemia   • Allergic Rhinitis   • Neck Pain   • Insomnia       History of Present Illness: Kamron Sanchez is a 71 y.o. male who is here today to follow up for hypertension, hyperlipidemia, cad, insomnia, neck pain, and emphysema     [facility] -  [facility]  Mike Hernandes NP completed echocardiogram  history of stent placement   Dermatology dr [facility] annual exam  Hem/oc cancer [facility] history of RUL NSCLC in 2016 and is status post RUL lobectomy ct chest dated 2021 negative for any cancer recurrence    Subjective      Review of Systems:   Review of Systems   Constitutional: Negative for chills and fever.   HENT: Negative for ear pain, sinus pain and sore throat.    Respiratory: Negative for cough.    Cardiovascular: Negative for chest pain.   Gastrointestinal: Negative for abdominal pain, diarrhea, nausea and vomiting.   Genitourinary: Negative for dysuria.   Musculoskeletal: Negative for myalgias.   Skin: Negative for rash.   Neurological: Negative for headaches.        Past Medical History:   Past Medical History:   Diagnosis Date   • Abnormal blood chemistry 2014    Elevated RBC count   • Arthritis 2014   • Arthropathy, unspecified     multiple sites   • Cancer (HCC)    • Cervicogenic headache 2019   • Emphysema of lung (HCC) 2016   • Heart attack (HCC)    • Hepatitis C    • Hyperlipemia    • Hyperlipidemia 2014   • Hypertension 2014   • Impaired fasting glucose 2014   • Lung cancer (HCC)    • Lung disease    • Lung nodule seen on imaging study 2016   • Myocardial infarction (HCC)    • Shortness of breath    • Tobacco abuse 2016       Past Surgical History:   Past Surgical History:   Procedure Laterality  Date   • CARDIAC SURGERY     • CARDIAC VALVE SURGERY     • COLONOSCOPY     • CORONARY STENT PLACEMENT     • FINGER SURGERY     • HAND SURGERY     • HERNIA REPAIR     • SKIN CANCER EXCISION     • TONSILLECTOMY         Family History:   Family History   Problem Relation Age of Onset   • Cancer Mother    • Diabetes Father    • Arthritis Father    • Cancer Father    • Stroke Other    • Breast cancer Other        Social History:   Social History     Socioeconomic History   • Marital status:    Tobacco Use   • Smoking status: Former Smoker     Packs/day: 2.00     Years: 31.00     Pack years: 62.00     Start date:      Quit date:      Years since quittin.0   • Smokeless tobacco: Never Used   Vaping Use   • Vaping Use: Never used   Substance and Sexual Activity   • Alcohol use: Never   • Drug use: Never   • Sexual activity: Defer       Medications:     Current Outpatient Medications:   •  albuterol sulfate HFA (ProAir HFA) 108 (90 Base) MCG/ACT inhaler, ProAir HFA 90 mcg/actuation aerosol inhaler, Disp: , Rfl:   •  ascorbic acid (VITAMIN C) 1000 MG tablet, Vitamin C 1,000 mg oral tablet take 1 tablet by oral route daily   Active, Disp: , Rfl:   •  atorvastatin (LIPITOR) 40 MG tablet, , Disp: , Rfl:   •  clopidogrel (PLAVIX) 75 MG tablet, , Disp: , Rfl:   •  fluorouracil (EFUDEX) 5 % cream, fluorouracil 5 % topical cream apply a sufficient amount to cover the lesions in the affected area(s) by topical route 2 times per day   Active, Disp: , Rfl:   •  gabapentin (NEURONTIN) 300 MG capsule, Take 1 capsule by mouth 3 (Three) Times a Day., Disp: 270 capsule, Rfl: 1  •  GARLIC PO, garlic 1,000 mg oral capsule take 1 capsule by oral route daily   Active, Disp: , Rfl:   •  isosorbide mononitrate (IMDUR) 60 MG 24 hr tablet, , Disp: , Rfl:   •  metoprolol succinate XL (TOPROL-XL) 50 MG 24 hr tablet, , Disp: , Rfl:   •  nitroglycerin (NITROSTAT) 0.3 MG SL tablet, nitroglycerin 0.3 mg sublingual tablet, sublingual  "place 1 tablet (0.3 mg) by sublingual route at the first sign of an attack; no more than 3 tabs are recommended within a 15 minute period.   Active, Disp: , Rfl:   •  tamsulosin (FLOMAX) 0.4 MG capsule 24 hr capsule, Take 1 capsule by mouth Daily., Disp: 90 capsule, Rfl: 1  •  vitamin D3 125 MCG (5000 UT) capsule capsule, Take 1,000 Units by mouth Daily., Disp: , Rfl:     Allergies:   No Known Allergies        Objective     Physical Exam:  Vital Signs:   Vitals:    01/06/22 0705   BP: 130/78   Pulse: 65   Temp: 97.9 °F (36.6 °C)   SpO2: 98%   Weight: 79.8 kg (176 lb)   Height: 180.3 cm (71\")     Body mass index is 24.55 kg/m².     Physical Exam  HENT:      Right Ear: Tympanic membrane normal.      Left Ear: Tympanic membrane normal.      Nose: Nose normal.      Mouth/Throat:      Mouth: Mucous membranes are moist.   Eyes:      Pupils: Pupils are equal, round, and reactive to light.   Neck:      Vascular: No carotid bruit.   Cardiovascular:      Rate and Rhythm: Normal rate and regular rhythm.      Heart sounds: Normal heart sounds. No murmur heard.      Pulmonary:      Effort: Pulmonary effort is normal.      Breath sounds: Normal breath sounds.   Abdominal:      General: Bowel sounds are normal.      Palpations: Abdomen is soft.   Musculoskeletal:      Right lower leg: No edema.      Left lower leg: No edema.   Skin:     General: Skin is warm and dry.   Neurological:      Mental Status: He is alert.   Psychiatric:         Mood and Affect: Mood normal.         Behavior: Behavior normal.             Assessment / Plan      Assessment/Plan:   Diagnoses and all orders for this visit:    1. Primary hypertension (Primary)  -     CBC Auto Differential    2. Mixed hyperlipidemia  -     Comprehensive Metabolic Panel  -     Lipid Panel    3. Seasonal allergies    4. Impaired fasting glucose  -     Hemoglobin A1c    5. Coronary artery disease involving native heart without angina pectoris, unspecified vessel or lesion " "type    6. Pulmonary emphysema, unspecified emphysema type (HCC)    7. DDD (degenerative disc disease), cervical  -     gabapentin (NEURONTIN) 300 MG capsule; Take 1 capsule by mouth 3 (Three) Times a Day.  Dispense: 270 capsule; Refill: 1    8. Cervico-occipital neuralgia  -     gabapentin (NEURONTIN) 300 MG capsule; Take 1 capsule by mouth 3 (Three) Times a Day.  Dispense: 270 capsule; Refill: 1    9. Primary insomnia    10. Benign prostatic hyperplasia with weak urinary stream  -     Urinalysis With Culture If Indicated -  -     PSA DIAGNOSTIC    11. Medication management  -     Urine Drug Screen - Urine, Clean Catch; Future    12. Screening for thyroid disorder  -     TSH    Other orders  -     tamsulosin (FLOMAX) 0.4 MG capsule 24 hr capsule; Take 1 capsule by mouth Daily.  Dispense: 90 capsule; Refill: 1    Hypertension currently controlled with Toprol and Imdur  Hyperlipidemia we will obtain lipid panel and CMP to monitor current statin dose Lipitor 40 mg at nighttime  Impaired fasting glucose obtain hemoglobin A1c to monitor recommend decrease carb intake active 30 minutes daily  Seasonal allergies currently controlled as needed use Zyrtec and Flonase we will provide refills  Emphysema no wheezing at this time stable with as needed use ProAir  Coronary artery disease on Plavix for clot prevention  Cervical degenerative disc disease neuralgia we will provide refill of gabapentin control symptoms at this time GARETT reviewed urine drug screen obtained  Insomnia also controlled with gabapentin nighttime dose  BPH with weak urinary stream we will refill Flomax and obtain PSA diagnostic  Vitamin D deficiency recommend 2000 units daily we will change continue 5000 units daily and provide prescription for 2000 units          Follow Up:   Return in about 6 months (around 7/6/2022).    Julienne Harrison, GLORIA    \"Please note that portions of this note were completed with a voice recognition program.\"    "

## 2022-01-10 RX ORDER — FLUTICASONE PROPIONATE 50 MCG
2 SPRAY, SUSPENSION (ML) NASAL DAILY
Qty: 3 ML | Refills: 1 | Status: SHIPPED | OUTPATIENT
Start: 2022-01-10 | End: 2023-01-06 | Stop reason: SDUPTHER

## 2022-01-12 ENCOUNTER — TELEPHONE (OUTPATIENT)
Dept: FAMILY MEDICINE CLINIC | Facility: CLINIC | Age: 72
End: 2022-01-12

## 2022-01-12 NOTE — TELEPHONE ENCOUNTER
----- Message from GLORIA Aguilar sent at 1/10/2022  2:17 PM EST -----  PSA normalControl cholesterolFasting glucose slightly elevated average glucose slightly elevated at risk for diabetes does not currently have diabetesSome level normalRenal function normalIncrease water intake we will recheck all labs at 6-month follow-up

## 2022-01-13 ENCOUNTER — TELEPHONE (OUTPATIENT)
Dept: FAMILY MEDICINE CLINIC | Facility: CLINIC | Age: 72
End: 2022-01-13

## 2022-02-10 ENCOUNTER — OFFICE VISIT (OUTPATIENT)
Dept: SLEEP MEDICINE | Facility: HOSPITAL | Age: 72
End: 2022-02-10

## 2022-02-10 VITALS
SYSTOLIC BLOOD PRESSURE: 132 MMHG | HEART RATE: 57 BPM | HEIGHT: 71 IN | OXYGEN SATURATION: 98 % | TEMPERATURE: 97.3 F | WEIGHT: 170 LBS | DIASTOLIC BLOOD PRESSURE: 76 MMHG | BODY MASS INDEX: 23.8 KG/M2

## 2022-02-10 DIAGNOSIS — G47.33 OSA (OBSTRUCTIVE SLEEP APNEA): Primary | ICD-10-CM

## 2022-02-10 DIAGNOSIS — G47.00 INSOMNIA, UNSPECIFIED TYPE: ICD-10-CM

## 2022-02-10 PROCEDURE — 99204 OFFICE O/P NEW MOD 45 MIN: CPT | Performed by: FAMILY MEDICINE

## 2022-02-10 PROCEDURE — G0463 HOSPITAL OUTPT CLINIC VISIT: HCPCS | Performed by: FAMILY MEDICINE

## 2022-02-10 NOTE — PROGRESS NOTES
Sleep Disorders Center Note     Chief Complaint:  DAYTON     Primary Care Physician: Garrett Harrison APRN Billy D Daniel is a 71 y.o.male who is a new patient to me was last seen at [facility] sleep lab: 9/17/2020.  History of obstructive sleep apnea.  Also has a history of restless leg syndrome.  2015 testing showed RDI 15.9 AHI 10.6.  Presents today for annual follow-up.  Patient also has a history of COPD CAD and hypertension.    Goes to bed around 11 PM wakes up around 5 AM.  ESS of 13.  Patient reports full facemask which leaks air.  ARABELLA of 0.5.    Machine is over 5 years old and broken beyond repair as humidifier does not work anymore.    Insomnia: For many years; sleep onset and sleep maintenance.  Relates to his career in the  and working as a  where he slept whenever he could.  Takes gabapentin 600 mg nightly which helps a little bit but not consistently.  Melatonin made him feel too groggy when he woke up the next day.    Results Review:  DME is [email or web address].  Downloads between 1/11/2022-2/9/2022.  Average usage is 6 hours 27 minutes.  Average AHI is 5.5.  Average AutoCPAP pressure is 11.8 cm H2O.    Current Medications:    Current Outpatient Medications:   •  albuterol sulfate HFA (ProAir HFA) 108 (90 Base) MCG/ACT inhaler, ProAir HFA 90 mcg/actuation aerosol inhaler, Disp: , Rfl:   •  ascorbic acid (VITAMIN C) 1000 MG tablet, Vitamin C 1,000 mg oral tablet take 1 tablet by oral route daily   Active, Disp: , Rfl:   •  atorvastatin (LIPITOR) 40 MG tablet, , Disp: , Rfl:   •  Cetirizine HCl 10 MG capsule, Take 10 mg by mouth every night at bedtime., Disp: 90 capsule, Rfl: 1  •  Cholecalciferol 50 MCG (2000 UT) tablet, Take 1 tablet by mouth Daily., Disp: 90 each, Rfl: 1  •  clopidogrel (PLAVIX) 75 MG tablet, , Disp: , Rfl:   •  fluorouracil (EFUDEX) 5 % cream, fluorouracil 5 % topical cream apply a sufficient amount to cover the lesions in the affected area(s) by topical route 2 times per  day   Active, Disp: , Rfl:   •  fluticasone (FLONASE) 50 MCG/ACT nasal spray, 2 sprays into the nostril(s) as directed by provider Daily. 2 sprays each nostril daily, Disp: 3 mL, Rfl: 1  •  gabapentin (NEURONTIN) 300 MG capsule, Take 1 capsule by mouth 3 (Three) Times a Day., Disp: 270 capsule, Rfl: 1  •  GARLIC PO, garlic 1,000 mg oral capsule take 1 capsule by oral route daily   Active, Disp: , Rfl:   •  isosorbide mononitrate (IMDUR) 60 MG 24 hr tablet, , Disp: , Rfl:   •  metoprolol succinate XL (TOPROL-XL) 50 MG 24 hr tablet, , Disp: , Rfl:   •  nitroglycerin (NITROSTAT) 0.3 MG SL tablet, nitroglycerin 0.3 mg sublingual tablet, sublingual place 1 tablet (0.3 mg) by sublingual route at the first sign of an attack; no more than 3 tabs are recommended within a 15 minute period.   Active, Disp: , Rfl:   •  tamsulosin (FLOMAX) 0.4 MG capsule 24 hr capsule, Take 1 capsule by mouth Daily., Disp: 90 capsule, Rfl: 1   also entered in Sleep Questionnaire    Patient  has a past medical history of Abnormal blood chemistry (2014), Arthritis (2014), Arthropathy, unspecified, Cancer (HCC), Cervicogenic headache (2019), Emphysema of lung (HCC) (2016), Heart attack (HCC), Hepatitis C, Hyperlipemia, Hyperlipidemia (2014), Hypertension (2014), Impaired fasting glucose (2014), Lung cancer (HCC), Lung disease, Lung nodule seen on imaging study (2016), Myocardial infarction (HCC), Shortness of breath, and Tobacco abuse (2016).    Social History:    Social History     Socioeconomic History   • Marital status:    Tobacco Use   • Smoking status: Former Smoker     Packs/day: 2.00     Years: 31.00     Pack years: 62.00     Start date:      Quit date:      Years since quittin.1   • Smokeless tobacco: Never Used   Vaping Use   • Vaping Use: Never used   Substance and Sexual Activity   • Alcohol use: Never   • Drug use: Never   • Sexual activity: Defer       Allergies:  " Patient has no known allergies.    Vital Signs:    Vitals:    02/10/22 0900   BP: 132/76   Pulse: 57   Temp: 97.3 °F (36.3 °C)   SpO2: 98%   Weight: 77.1 kg (170 lb)   Height: 180.3 cm (71\")     Body mass index is 23.71 kg/m².    REVIEW OF SYSTEMS.  Full review of systems available on the intake form which is scanned in the media tab.  The relevant positive are noted below  1. Daytime excessive sleepiness with [place] Sleepiness Scale :Total score: 13   2. Snoring  3. Nasal congestion dry mouth shortness of breath cough      Physical exam:  Vitals:    02/10/22 0900   BP: 132/76   Pulse: 57   Temp: 97.3 °F (36.3 °C)   SpO2: 98%   Weight: 77.1 kg (170 lb)   Height: 180.3 cm (71\")    Body mass index is 23.71 kg/m².    HEENT: Head is atraumatic, normocephalic  Eyes: pupils are round equal and reacting to light and accommodation, conjunctiva normal  Nose: no nasal septal defects or deviation and the nasal passages are clear, no nasal polyps,  Throat: tongue normal, oral airway Mallampati class 4  NECK: , trachea is in the midline, thyroid not enlarged  RESPIRATORY SYSTEM: Breath sounds are equal on both sides, there are no wheezes   CARDIOVASULAR SYSTEM: Heart sounds are regular rhythm and sisi rate, no edema  EXTREMITES: No cyanosis, clubbing  NEUROLOGICAL SYSTEM: Oriented x 3, no gross motor defects, gait normal    Impression:  1. DAYTON (obstructive sleep apnea)    2. Insomnia, unspecified type        Obstructive sleep apnea adequately treated with auto CPAP 11-14 cm H2O with good compliance and usage and no complaints of hypersomnolence.  We will place order for new auto CPAP machine as current machine is older than 5 years old and broken beyond repair.  Return to clinic after least 1 month use of new Pap machine for follow-up or sooner if needed.    Discussed considering CBT-I for longstanding insomnia history.  Patient will call me if he wants referral.    Patient uses the CPAP device and benefits from its use in " terms of reduction of hypersomnia and snoring.Weight loss will be strongly beneficial to reduce the severity of sleep-disordered breathing.  Caution during activities that require prolonged concentration is strongly advised if sleepiness returns. Changing of PAP supplies regularly is important for effective use. Patient needs to change cushion on the mask or plugs on nasal pillows along with disposable filters once every month and change mask frame, tubing, headgear and Velcro straps every 6 months at the minimum.    Quinton Daugherty MD  Sleep Medicine  02/10/22  09:46 EST

## 2022-05-23 ENCOUNTER — OFFICE VISIT (OUTPATIENT)
Dept: FAMILY MEDICINE CLINIC | Facility: CLINIC | Age: 72
End: 2022-05-23

## 2022-05-23 ENCOUNTER — HOSPITAL ENCOUNTER (OUTPATIENT)
Dept: GENERAL RADIOLOGY | Facility: HOSPITAL | Age: 72
Discharge: HOME | End: 2022-05-23
Admitting: NURSE PRACTITIONER

## 2022-05-23 VITALS
WEIGHT: 182 LBS | SYSTOLIC BLOOD PRESSURE: 128 MMHG | HEART RATE: 70 BPM | BODY MASS INDEX: 25.48 KG/M2 | HEIGHT: 71 IN | TEMPERATURE: 97.6 F | DIASTOLIC BLOOD PRESSURE: 78 MMHG | OXYGEN SATURATION: 98 %

## 2022-05-23 DIAGNOSIS — S29.011A MUSCLE STRAIN OF CHEST WALL, INITIAL ENCOUNTER: ICD-10-CM

## 2022-05-23 DIAGNOSIS — R06.02 SHORT OF BREATH ON EXERTION: ICD-10-CM

## 2022-05-23 DIAGNOSIS — R06.02 SHORT OF BREATH ON EXERTION: Primary | ICD-10-CM

## 2022-05-23 DIAGNOSIS — R07.81 RIB PAIN ON LEFT SIDE: ICD-10-CM

## 2022-05-23 PROCEDURE — 99213 OFFICE O/P EST LOW 20 MIN: CPT | Performed by: NURSE PRACTITIONER

## 2022-05-23 PROCEDURE — 71046 X-RAY EXAM CHEST 2 VIEWS: CPT

## 2022-05-23 RX ORDER — METHYLPREDNISOLONE 4 MG/1
TABLET ORAL
Qty: 1 EACH | Refills: 0 | Status: SHIPPED | OUTPATIENT
Start: 2022-05-23 | End: 2022-07-06

## 2022-05-23 RX ORDER — CYCLOBENZAPRINE HCL 10 MG
10 TABLET ORAL 3 TIMES DAILY PRN
Qty: 90 TABLET | Refills: 1 | Status: SHIPPED | OUTPATIENT
Start: 2022-05-23 | End: 2023-01-06 | Stop reason: SDUPTHER

## 2022-05-23 NOTE — PROGRESS NOTES
Patient Name: Kamron Sanchez  : 1950   MRN: [id number]     Chief Complaint:  Left side of chest, muscle    History of Present Illness: Kamron Sanchez is a 71 y.o. male who is here today for Thursday patient was helping his friend put up a house door (steel), he was trying to lift the door and he felt a pop in his left side of his chest.  When patient breathes in it hurts, its a constant hurt.  He states when he presses on the area it is unbearable  Also short of breath, hard to take a deep breath   Ribs hurt to touch       Subjective      Review of Systems:   Review of Systems   Constitutional: Negative for fever.   Respiratory: Positive for shortness of breath.    Cardiovascular: Positive for chest pain.   Musculoskeletal: Positive for myalgias.   Skin: Negative for rash.        Past Medical History:   Past Medical History:   Diagnosis Date   • Abnormal blood chemistry 2014    Elevated RBC count   • Arthritis 2014   • Arthropathy, unspecified     multiple sites   • Cancer (HCC)    • Cervicogenic headache 2019   • Emphysema of lung (HCC) 2016   • Heart attack (HCC)    • Hepatitis C    • Hyperlipemia    • Hyperlipidemia 2014   • Hypertension 2014   • Impaired fasting glucose 2014   • Lung cancer (HCC)    • Lung disease    • Lung nodule seen on imaging study 2016   • Myocardial infarction (HCC)    • Shortness of breath    • Tobacco abuse 2016       Past Surgical History:   Past Surgical History:   Procedure Laterality Date   • CARDIAC SURGERY     • CARDIAC VALVE SURGERY     • COLONOSCOPY     • CORONARY STENT PLACEMENT     • FINGER SURGERY     • HAND SURGERY     • HERNIA REPAIR     • SKIN CANCER EXCISION     • TONSILLECTOMY         Family History:   Family History   Problem Relation Age of Onset   • Cancer Mother    • Diabetes Father    • Arthritis Father    • Cancer Father    • Stroke Other    • Breast cancer Other        Social History:   Social  History     Socioeconomic History   • Marital status:    Tobacco Use   • Smoking status: Former Smoker     Packs/day: 2.00     Years: 31.00     Pack years: 62.00     Start date:      Quit date:      Years since quittin.3   • Smokeless tobacco: Never Used   Vaping Use   • Vaping Use: Never used   Substance and Sexual Activity   • Alcohol use: Never   • Drug use: Never   • Sexual activity: Defer       Medications:     Current Outpatient Medications:   •  albuterol sulfate  (90 Base) MCG/ACT inhaler, ProAir HFA 90 mcg/actuation aerosol inhaler, Disp: , Rfl:   •  ascorbic acid (VITAMIN C) 1000 MG tablet, Vitamin C 1,000 mg oral tablet take 1 tablet by oral route daily   Active, Disp: , Rfl:   •  atorvastatin (LIPITOR) 40 MG tablet, , Disp: , Rfl:   •  Cholecalciferol 50 MCG (2000 UT) tablet, Take 1 tablet by mouth Daily., Disp: 90 each, Rfl: 1  •  clopidogrel (PLAVIX) 75 MG tablet, , Disp: , Rfl:   •  gabapentin (NEURONTIN) 300 MG capsule, Take 1 capsule by mouth 3 (Three) Times a Day., Disp: 270 capsule, Rfl: 1  •  GARLIC PO, garlic 1,000 mg oral capsule take 1 capsule by oral route daily   Active, Disp: , Rfl:   •  isosorbide mononitrate (IMDUR) 60 MG 24 hr tablet, , Disp: , Rfl:   •  metoprolol succinate XL (TOPROL-XL) 50 MG 24 hr tablet, , Disp: , Rfl:   •  nitroglycerin (NITROSTAT) 0.3 MG SL tablet, nitroglycerin 0.3 mg sublingual tablet, sublingual place 1 tablet (0.3 mg) by sublingual route at the first sign of an attack; no more than 3 tabs are recommended within a 15 minute period.   Active, Disp: , Rfl:   •  tamsulosin (FLOMAX) 0.4 MG capsule 24 hr capsule, Take 1 capsule by mouth Daily., Disp: 90 capsule, Rfl: 1  •  Cetirizine HCl 10 MG capsule, Take 10 mg by mouth every night at bedtime., Disp: 90 capsule, Rfl: 1  •  cyclobenzaprine (FLEXERIL) 10 MG tablet, Take 1 tablet by mouth 3 (Three) Times a Day As Needed for Muscle Spasms., Disp: 90 tablet, Rfl: 1  •  fluorouracil (EFUDEX) 5  "% cream, fluorouracil 5 % topical cream apply a sufficient amount to cover the lesions in the affected area(s) by topical route 2 times per day   Active, Disp: , Rfl:   •  fluticasone (FLONASE) 50 MCG/ACT nasal spray, 2 sprays into the nostril(s) as directed by provider Daily. 2 sprays each nostril daily, Disp: 3 mL, Rfl: 1  •  methylPREDNISolone (MEDROL) 4 MG dose pack, Take as directed on package instructions., Disp: 1 each, Rfl: 0    Allergies:   No Known Allergies      Objective     Physical Exam:  Vital Signs:   Vitals:    05/23/22 1204   BP: 128/78   Pulse: 70   Temp: 97.6 °F (36.4 °C)   SpO2: 98%   Weight: 82.6 kg (182 lb)   Height: 180.3 cm (71\")     Body mass index is 25.38 kg/m².     Physical Exam  Cardiovascular:      Rate and Rhythm: Normal rate and regular rhythm.      Heart sounds: Normal heart sounds. No murmur heard.  Pulmonary:      Effort: Pulmonary effort is normal.      Breath sounds: Normal breath sounds.   Chest:      Chest wall: Tenderness present.   Breasts:      Left: Tenderness present.         Skin:     General: Skin is warm and dry.   Neurological:      Mental Status: He is alert.             Assessment / Plan      Assessment/Plan:   Diagnoses and all orders for this visit:    1. Short of breath on exertion (Primary)  -     XR Chest PA & Lateral; Future    2. Muscle strain of chest wall, initial encounter  -     XR Chest PA & Lateral; Future    3. Rib pain on left side    Other orders  -     methylPREDNISolone (MEDROL) 4 MG dose pack; Take as directed on package instructions.  Dispense: 1 each; Refill: 0  -     cyclobenzaprine (FLEXERIL) 10 MG tablet; Take 1 tablet by mouth 3 (Three) Times a Day As Needed for Muscle Spasms.  Dispense: 90 tablet; Refill: 1    Shortness of breath will obtain chest x-ray will call with results and further recommendations  Muscle strain of chest wall we will treat with Medrol Dosepak and cyclobenzaprine also may use Tylenol OTC and patient reports he has " lidocaine patches at home      Follow Up:   Return if symptoms worsen or fail to improve.    GLORIA Emmanuel      Please note that portions of this note may have been completed with a voice recognition program. Efforts were made to edit the dictations, but occasionally words are mistranscribed.

## 2022-06-07 ENCOUNTER — OFFICE VISIT (OUTPATIENT)
Dept: SLEEP MEDICINE | Facility: HOSPITAL | Age: 72
End: 2022-06-07

## 2022-06-07 VITALS
WEIGHT: 185 LBS | HEART RATE: 79 BPM | SYSTOLIC BLOOD PRESSURE: 145 MMHG | HEIGHT: 71 IN | OXYGEN SATURATION: 97 % | BODY MASS INDEX: 25.9 KG/M2 | DIASTOLIC BLOOD PRESSURE: 90 MMHG

## 2022-06-07 DIAGNOSIS — G47.33 OSA (OBSTRUCTIVE SLEEP APNEA): Primary | ICD-10-CM

## 2022-06-07 PROCEDURE — G0463 HOSPITAL OUTPT CLINIC VISIT: HCPCS | Performed by: FAMILY MEDICINE

## 2022-06-07 PROCEDURE — 99213 OFFICE O/P EST LOW 20 MIN: CPT | Performed by: FAMILY MEDICINE

## 2022-06-07 NOTE — PROGRESS NOTES
Follow Up Sleep Disorders Center Note     Chief Complaint:  DAYTON     Primary Care Physician: Garrett Harrison APRN Billy SANDRA Sanchez is a 71 y.o.male  was last seen at [facility] sleep lab: 2/10/2022.  2015 testing showed RDI 15.9 AHI 10.6.  Machine was broken beyond repair.  Needed new machine.  Please order for new auto CPAP machine.  Presents today for first follow-up visit since receiving new machine.  Reports bedtime 11 PM wake time 5 AM sleeps 4 to 0.5 hours.  Fullface mask which fits well leaks some air ESS of 8.  Some dry mouth issues; on maximum setting for humidity and has emitted fire in his room already.    Results Review:  DME is [employer]  Downloads between 5/7/2022 - 6/5/2022.  Average usage is 7 hours 21 minutes.  Average AHI is 3.7.  Average AutoCPAP pressure is 12.6 cm H2O.    Current Medications:    Current Outpatient Medications:   •  albuterol sulfate  (90 Base) MCG/ACT inhaler, ProAir HFA 90 mcg/actuation aerosol inhaler, Disp: , Rfl:   •  ascorbic acid (VITAMIN C) 1000 MG tablet, Vitamin C 1,000 mg oral tablet take 1 tablet by oral route daily   Active, Disp: , Rfl:   •  atorvastatin (LIPITOR) 40 MG tablet, , Disp: , Rfl:   •  Cetirizine HCl 10 MG capsule, Take 10 mg by mouth every night at bedtime., Disp: 90 capsule, Rfl: 1  •  Cholecalciferol 50 MCG (2000 UT) tablet, Take 1 tablet by mouth Daily., Disp: 90 each, Rfl: 1  •  clopidogrel (PLAVIX) 75 MG tablet, , Disp: , Rfl:   •  cyclobenzaprine (FLEXERIL) 10 MG tablet, Take 1 tablet by mouth 3 (Three) Times a Day As Needed for Muscle Spasms., Disp: 90 tablet, Rfl: 1  •  fluorouracil (EFUDEX) 5 % cream, fluorouracil 5 % topical cream apply a sufficient amount to cover the lesions in the affected area(s) by topical route 2 times per day   Active, Disp: , Rfl:   •  fluticasone (FLONASE) 50 MCG/ACT nasal spray, 2 sprays into the nostril(s) as directed by provider Daily. 2 sprays each nostril daily, Disp: 3 mL, Rfl: 1  •  gabapentin (NEURONTIN)  300 MG capsule, Take 1 capsule by mouth 3 (Three) Times a Day., Disp: 270 capsule, Rfl: 1  •  GARLIC PO, garlic 1,000 mg oral capsule take 1 capsule by oral route daily   Active, Disp: , Rfl:   •  isosorbide mononitrate (IMDUR) 60 MG 24 hr tablet, , Disp: , Rfl:   •  methylPREDNISolone (MEDROL) 4 MG dose pack, Take as directed on package instructions., Disp: 1 each, Rfl: 0  •  metoprolol succinate XL (TOPROL-XL) 50 MG 24 hr tablet, , Disp: , Rfl:   •  nitroglycerin (NITROSTAT) 0.3 MG SL tablet, nitroglycerin 0.3 mg sublingual tablet, sublingual place 1 tablet (0.3 mg) by sublingual route at the first sign of an attack; no more than 3 tabs are recommended within a 15 minute period.   Active, Disp: , Rfl:   •  tamsulosin (FLOMAX) 0.4 MG capsule 24 hr capsule, Take 1 capsule by mouth Daily., Disp: 90 capsule, Rfl: 1   also entered in Sleep Questionnaire    Patient  has a past medical history of Abnormal blood chemistry (2014), Arthritis (2014), Arthropathy, unspecified, Cancer (HCC), Cervicogenic headache (2019), Emphysema of lung (HCC) (2016), Heart attack (HCC), Hepatitis C, Hyperlipemia, Hyperlipidemia (2014), Hypertension (2014), Impaired fasting glucose (2014), Lung cancer (HCC), Lung disease, Lung nodule seen on imaging study (2016), Myocardial infarction (HCC), Shortness of breath, and Tobacco abuse (2016).    Social History:    Social History     Socioeconomic History   • Marital status:    Tobacco Use   • Smoking status: Former Smoker     Packs/day: 2.00     Years: 31.00     Pack years: 62.00     Start date:      Quit date:      Years since quittin.4   • Smokeless tobacco: Never Used   Vaping Use   • Vaping Use: Never used   Substance and Sexual Activity   • Alcohol use: Never   • Drug use: Never   • Sexual activity: Defer       Allergies:  Patient has no known allergies.    Vital Signs:    Vitals:    22 0700   BP: 145/90   Pulse:  "79   SpO2: 97%   Weight: 83.9 kg (185 lb)   Height: 180.3 cm (71\")     Body mass index is 25.8 kg/m².    REVIEW OF SYSTEMS.  Full review of systems available on the intake form which is scanned in the media tab.  The relevant positive are noted below  1. Daytime excessive sleepiness with [place] Sleepiness Scale :Total score: 8   2. Snoring  3. Dry mouth shortness of breath      Physical exam:  Vitals:    06/07/22 0700   BP: 145/90   Pulse: 79   SpO2: 97%   Weight: 83.9 kg (185 lb)   Height: 180.3 cm (71\")    Body mass index is 25.8 kg/m².    HEENT: Head is atraumatic, normocephalic  Eyes: pupils are round equal and reacting to light and accommodation, conjunctiva normal  NECK: , trachea is in the midline, thyroid not enlarged  RESPIRATORY SYSTEM: Regular respirations  CARDIOVASULAR SYSTEM: Regular rate  EXTREMITES: No cyanosis, clubbing  NEUROLOGICAL SYSTEM: Oriented x 3, no gross motor defects, gait normal    Impression:  1. DAYTON (obstructive sleep apnea)        Obstructive sleep apnea adequately treated with auto CPAP 11-15 cm H2O with good compliance and usage and no complaints of hypersomnolence.  Return to clinic in 1 year for follow-up or sooner if needed.    Patient uses the CPAP device and benefits from its use in terms of reduction of hypersomnia and snoring.Weight loss will be strongly beneficial to reduce the severity of sleep-disordered breathing.  Caution during activities that require prolonged concentration is strongly advised if sleepiness returns. Changing of PAP supplies regularly is important for effective use. Patient needs to change cushion on the mask or plugs on nasal pillows along with disposable filters once every month and change mask frame, tubing, headgear and Velcro straps every 6 months at the minimum.    Quinton Daugherty MD  Sleep Medicine  06/07/22  09:24 EDT      "

## 2022-07-06 ENCOUNTER — OFFICE VISIT (OUTPATIENT)
Dept: FAMILY MEDICINE CLINIC | Facility: CLINIC | Age: 72
End: 2022-07-06

## 2022-07-06 VITALS
OXYGEN SATURATION: 98 % | DIASTOLIC BLOOD PRESSURE: 82 MMHG | BODY MASS INDEX: 26.18 KG/M2 | HEART RATE: 69 BPM | SYSTOLIC BLOOD PRESSURE: 128 MMHG | WEIGHT: 187 LBS | HEIGHT: 71 IN | TEMPERATURE: 98 F

## 2022-07-06 DIAGNOSIS — Z12.11 SCREENING FOR COLON CANCER: ICD-10-CM

## 2022-07-06 DIAGNOSIS — R42 VERTIGO: ICD-10-CM

## 2022-07-06 DIAGNOSIS — R39.12 BENIGN PROSTATIC HYPERPLASIA WITH WEAK URINARY STREAM: ICD-10-CM

## 2022-07-06 DIAGNOSIS — N40.1 BENIGN PROSTATIC HYPERPLASIA WITH WEAK URINARY STREAM: ICD-10-CM

## 2022-07-06 DIAGNOSIS — E78.2 MIXED HYPERLIPIDEMIA: ICD-10-CM

## 2022-07-06 DIAGNOSIS — M54.81 CERVICO-OCCIPITAL NEURALGIA: ICD-10-CM

## 2022-07-06 DIAGNOSIS — M65.322 TRIGGER INDEX FINGER OF LEFT HAND: ICD-10-CM

## 2022-07-06 DIAGNOSIS — M50.30 DDD (DEGENERATIVE DISC DISEASE), CERVICAL: ICD-10-CM

## 2022-07-06 DIAGNOSIS — J30.2 SEASONAL ALLERGIES: ICD-10-CM

## 2022-07-06 DIAGNOSIS — I10 PRIMARY HYPERTENSION: ICD-10-CM

## 2022-07-06 DIAGNOSIS — Z79.899 MEDICATION MANAGEMENT: ICD-10-CM

## 2022-07-06 PROCEDURE — 99214 OFFICE O/P EST MOD 30 MIN: CPT | Performed by: NURSE PRACTITIONER

## 2022-07-06 PROCEDURE — 80305 DRUG TEST PRSMV DIR OPT OBS: CPT | Performed by: NURSE PRACTITIONER

## 2022-07-06 RX ORDER — PREDNISONE 20 MG/1
20 TABLET ORAL DAILY
Qty: 10 TABLET | Refills: 0 | Status: SHIPPED | OUTPATIENT
Start: 2022-07-06 | End: 2022-07-16

## 2022-07-06 RX ORDER — MECLIZINE HYDROCHLORIDE 25 MG/1
25 TABLET ORAL 3 TIMES DAILY PRN
Qty: 90 TABLET | Refills: 5 | Status: SHIPPED | OUTPATIENT
Start: 2022-07-06

## 2022-07-06 RX ORDER — GABAPENTIN 300 MG/1
300 CAPSULE ORAL 3 TIMES DAILY
Qty: 270 CAPSULE | Refills: 1 | Status: SHIPPED | OUTPATIENT
Start: 2022-07-06 | End: 2023-01-06 | Stop reason: SDUPTHER

## 2022-07-06 RX ORDER — TAMSULOSIN HYDROCHLORIDE 0.4 MG/1
1 CAPSULE ORAL DAILY
Qty: 90 CAPSULE | Refills: 1 | Status: SHIPPED | OUTPATIENT
Start: 2022-07-06 | End: 2023-01-06 | Stop reason: SDUPTHER

## 2022-07-06 NOTE — PROGRESS NOTES
Follow Up Office Visit      Patient Name: Kamron Sanchez  : 1950   MRN: [id number]     Chief Complaint:    Chief Complaint   Patient presents with   • Hypertension   • Hyperlipidemia   • Allergic Rhinitis   • Insomnia       History of Present Illness: Kamron Sanchez is a 71 y.o. male who is here today to follow up for hypertension, hyperlipidemia, CAD, allergic rhinitis, insomnia, BPH and chronic neck pain     psa- 2022  [facility] 2018      C/o wanting a referral for surgery for trigger finger of left index finger. Completed therapy no relief     Patient has also been having dizziness for about 2 weeks.   It starts in the mornings. He is dizzy when he gets up and has to use the wall for stability when he walks.  No use of  flonase or zyrtec      Mike Hernandes NP   Cardiology annual follow up scheduled  history of stent       Subjective      Review of Systems:   Review of Systems   Constitutional: Negative for fever.   HENT: Negative for ear pain, sinus pain and sore throat.    Respiratory: Negative for shortness of breath.    Cardiovascular: Negative for chest pain.   Gastrointestinal: Negative for abdominal pain, diarrhea, nausea and vomiting.   Genitourinary: Negative for dysuria.   Musculoskeletal: Positive for neck pain. Negative for back pain and myalgias.   Neurological: Negative for headaches.   Psychiatric/Behavioral: Negative for sleep disturbance.        Past Medical History:   Past Medical History:   Diagnosis Date   • Abnormal blood chemistry 2014    Elevated RBC count   • Arthritis 2014   • Arthropathy, unspecified     multiple sites   • Cancer (HCC)    • Cervicogenic headache 2019   • Emphysema of lung (HCC) 2016   • Heart attack (HCC)    • Hepatitis C    • Hyperlipemia    • Hyperlipidemia 2014   • Hypertension 2014   • Impaired fasting glucose 2014   • Lung cancer (HCC)    • Lung disease    • Lung nodule seen on imaging study 2016    • Myocardial infarction (HCC)    • Shortness of breath    • Tobacco abuse 2016       Past Surgical History:   Past Surgical History:   Procedure Laterality Date   • CARDIAC SURGERY     • CARDIAC VALVE SURGERY     • COLONOSCOPY     • CORONARY STENT PLACEMENT     • FINGER SURGERY     • HAND SURGERY     • HERNIA REPAIR     • SKIN CANCER EXCISION     • TONSILLECTOMY         Family History:   Family History   Problem Relation Age of Onset   • Cancer Mother    • Diabetes Father    • Arthritis Father    • Cancer Father    • Stroke Other    • Breast cancer Other        Social History:   Social History     Socioeconomic History   • Marital status:    Tobacco Use   • Smoking status: Former Smoker     Packs/day: 2.00     Years: 31.00     Pack years: 62.00     Start date:      Quit date:      Years since quittin.5   • Smokeless tobacco: Never Used   Vaping Use   • Vaping Use: Never used   Substance and Sexual Activity   • Alcohol use: Never   • Drug use: Never   • Sexual activity: Defer       Medications:     Current Outpatient Medications:   •  albuterol sulfate  (90 Base) MCG/ACT inhaler, ProAir HFA 90 mcg/actuation aerosol inhaler, Disp: , Rfl:   •  ascorbic acid (VITAMIN C) 1000 MG tablet, Vitamin C 1,000 mg oral tablet take 1 tablet by oral route daily   Active, Disp: , Rfl:   •  atorvastatin (LIPITOR) 40 MG tablet, , Disp: , Rfl:   •  Cetirizine HCl 10 MG capsule, Take 10 mg by mouth every night at bedtime., Disp: 90 capsule, Rfl: 1  •  Cholecalciferol 50 MCG (2000 UT) tablet, Take 1 tablet by mouth Daily., Disp: 90 each, Rfl: 1  •  clopidogrel (PLAVIX) 75 MG tablet, , Disp: , Rfl:   •  cyclobenzaprine (FLEXERIL) 10 MG tablet, Take 1 tablet by mouth 3 (Three) Times a Day As Needed for Muscle Spasms., Disp: 90 tablet, Rfl: 1  •  fluorouracil (EFUDEX) 5 % cream, fluorouracil 5 % topical cream apply a sufficient amount to cover the lesions in the affected area(s) by topical route 2 times per  "day   Active, Disp: , Rfl:   •  fluticasone (FLONASE) 50 MCG/ACT nasal spray, 2 sprays into the nostril(s) as directed by provider Daily. 2 sprays each nostril daily, Disp: 3 mL, Rfl: 1  •  gabapentin (NEURONTIN) 300 MG capsule, Take 1 capsule by mouth 3 (Three) Times a Day., Disp: 270 capsule, Rfl: 1  •  GARLIC PO, garlic 1,000 mg oral capsule take 1 capsule by oral route daily   Active, Disp: , Rfl:   •  isosorbide mononitrate (IMDUR) 60 MG 24 hr tablet, , Disp: , Rfl:   •  metoprolol succinate XL (TOPROL-XL) 50 MG 24 hr tablet, , Disp: , Rfl:   •  nitroglycerin (NITROSTAT) 0.3 MG SL tablet, nitroglycerin 0.3 mg sublingual tablet, sublingual place 1 tablet (0.3 mg) by sublingual route at the first sign of an attack; no more than 3 tabs are recommended within a 15 minute period.   Active, Disp: , Rfl:   •  tamsulosin (FLOMAX) 0.4 MG capsule 24 hr capsule, Take 1 capsule by mouth Daily., Disp: 90 capsule, Rfl: 1  •  Diclofenac Sodium (VOLTAREN) 1 % gel gel, Apply 4 g topically to the appropriate area as directed 4 (Four) Times a Day As Needed (joint)., Disp: 350 g, Rfl: 1  •  meclizine (ANTIVERT) 25 MG tablet, Take 1 tablet by mouth 3 (Three) Times a Day As Needed for Dizziness., Disp: 90 tablet, Rfl: 5  •  predniSONE (DELTASONE) 20 MG tablet, Take 1 tablet by mouth Daily for 10 days., Disp: 10 tablet, Rfl: 0    Allergies:   No Known Allergies        Objective     Physical Exam:  Vital Signs:   Vitals:    07/06/22 0712   BP: 143/72   Pulse: 69   Temp: 98 °F (36.7 °C)   SpO2: 98%   Weight: 84.8 kg (187 lb)   Height: 180.3 cm (71\")     Body mass index is 26.08 kg/m².     Physical Exam  HENT:      Head:      Comments: Positive dix[place] pike left ear      Right Ear: No middle ear effusion. Tympanic membrane is injected.      Left Ear: A middle ear effusion is present.      Nose: Congestion and rhinorrhea present.      Right Turbinates: Enlarged and swollen.      Left Turbinates: Enlarged and swollen.      Mouth/Throat:    "   Mouth: Mucous membranes are moist.   Eyes:      Conjunctiva/sclera:      Right eye: Right conjunctiva is injected.      Left eye: Left conjunctiva is injected.   Neck:      Vascular: No carotid bruit.   Cardiovascular:      Rate and Rhythm: Normal rate and regular rhythm.      Heart sounds: Normal heart sounds. No murmur heard.  Pulmonary:      Effort: Pulmonary effort is normal.      Breath sounds: Normal breath sounds.   Abdominal:      General: Bowel sounds are normal.      Palpations: Abdomen is soft.   Musculoskeletal:      Right lower leg: No edema.      Left lower leg: No edema.      Comments: Left second digit trigger finger   Skin:     General: Skin is warm and dry.   Neurological:      Mental Status: He is alert.   Psychiatric:         Mood and Affect: Mood normal.         Behavior: Behavior normal.             Assessment / Plan      Assessment/Plan:   Diagnoses and all orders for this visit:    1. Primary hypertension    2. Mixed hyperlipidemia    3. Benign prostatic hyperplasia with weak urinary stream    4. DDD (degenerative disc disease), cervical  -     gabapentin (NEURONTIN) 300 MG capsule; Take 1 capsule by mouth 3 (Three) Times a Day.  Dispense: 270 capsule; Refill: 1    5. Cervico-occipital neuralgia  -     gabapentin (NEURONTIN) 300 MG capsule; Take 1 capsule by mouth 3 (Three) Times a Day.  Dispense: 270 capsule; Refill: 1    6. Seasonal allergies    7. Trigger index finger of left hand  -     Ambulatory Referral to Hand Surgery    8. Vertigo  -     Ambulatory Referral to Physical Therapy Evaluate and treat    9. Medication management  -     POC Urine Drug Screen [facility]    10. Screening for colon cancer  -     Cologuard - Stool, Per Rectum; Future    Other orders  -     tamsulosin (FLOMAX) 0.4 MG capsule 24 hr capsule; Take 1 capsule by mouth Daily.  Dispense: 90 capsule; Refill: 1  -     meclizine (ANTIVERT) 25 MG tablet; Take 1 tablet by mouth 3 (Three) Times a Day As Needed for  "Dizziness.  Dispense: 90 tablet; Refill: 5  -     predniSONE (DELTASONE) 20 MG tablet; Take 1 tablet by mouth Daily for 10 days.  Dispense: 10 tablet; Refill: 0  -     Diclofenac Sodium (VOLTAREN) 1 % gel gel; Apply 4 g topically to the appropriate area as directed 4 (Four) Times a Day As Needed (joint).  Dispense: 350 g; Refill: 1       Pretension elevated upon arrival to clinic manual recheck improved currently on Toprol keep follow-up with cardiology as scheduled  Hyperlipidemia we will obtain lipid panel and CMP to monitor current statin dose  BPH weak urine stream controlled with Flomax will provide refills  Cervical neuralgia degenerative disc disease controlled with gabapentin Rufino reviewed urine drug screen obtained will provide refill  Seasonal allergies uncontrolled patient is not using Flonase or Zyrtec is positive for vertigo recommend using Flonase and Zyrtec daily we will add prednisone at this time and Antivert for dizziness place a referral to physical therapy  Trigger finger left hand requests referral to hand surgeon failed physical therapy will provide diclofenac gel for pain and provide referral      Follow Up:   Return in about 6 months (around 1/6/2023).    Julienne Harrison, APRN    \"Please note that portions of this note were completed with a voice recognition program.\"    "

## 2022-08-11 ENCOUNTER — TELEPHONE (OUTPATIENT)
Dept: FAMILY MEDICINE CLINIC | Facility: CLINIC | Age: 72
End: 2022-08-11

## 2022-08-11 NOTE — TELEPHONE ENCOUNTER
Called Kamron Sanchez, no answer. Left vm for pt to call office back       HUB TO READ:   You have overdue lab orders that are needing to be collected.   You may collect these at the office Monday through Friday 8:30-4:00 or you may complete them at one of our outpatient lab facilities.

## 2022-08-18 ENCOUNTER — TELEPHONE (OUTPATIENT)
Dept: ONCOLOGY | Facility: HOSPITAL | Age: 72
End: 2022-08-18

## 2022-08-18 DIAGNOSIS — C34.90 NON-SMALL CELL LUNG CANCER, UNSPECIFIED LATERALITY: Primary | ICD-10-CM

## 2022-08-19 ENCOUNTER — APPOINTMENT (OUTPATIENT)
Dept: CT IMAGING | Facility: HOSPITAL | Age: 72
End: 2022-08-19

## 2022-08-24 ENCOUNTER — APPOINTMENT (OUTPATIENT)
Dept: ONCOLOGY | Facility: HOSPITAL | Age: 72
End: 2022-08-24

## 2022-09-02 ENCOUNTER — TELEPHONE (OUTPATIENT)
Dept: ONCOLOGY | Facility: HOSPITAL | Age: 72
End: 2022-09-02

## 2022-09-02 NOTE — TELEPHONE ENCOUNTER
Caller: Kamron Sanchez    Relationship to patient: Self    Best call back number: [phone]    Chief complaint: R/S    Type of visit: FOLLOW UP    Requested date: AFTER 9-30, HIS CT WAS R/S    If rescheduling, when is the original appointment: 9-16     Additional notes:PLEASE ADVISE

## 2022-09-07 ENCOUNTER — TREATMENT (OUTPATIENT)
Dept: PHYSICAL THERAPY | Facility: CLINIC | Age: 72
End: 2022-09-07

## 2022-09-07 DIAGNOSIS — R26.89 IMBALANCE: ICD-10-CM

## 2022-09-07 DIAGNOSIS — R42 DIZZINESS: Primary | ICD-10-CM

## 2022-09-07 PROCEDURE — 95992 CANALITH REPOSITIONING PROC: CPT | Performed by: PHYSICAL THERAPIST

## 2022-09-07 PROCEDURE — 97161 PT EVAL LOW COMPLEX 20 MIN: CPT | Performed by: PHYSICAL THERAPIST

## 2022-09-07 NOTE — PROGRESS NOTES
Physical Therapy Initial Evaluation and Plan of Care    Patient: Kamron Sanchez   : 1950  Diagnosis/ICD-10 Code:  Dizziness [R42]  Referring practitioner: Garrett Harrison*  Date of Initial Visit: 2022  Today's Date: 2022  Patient seen for 1 sessions           Subjective Questionnaire: DHI: 14% limited      Subjective Evaluation    History of Present Illness  Mechanism of injury: Pt has spells when he gets up, especially first thing when he gets out of the bed, and he has no sense of balance.  He states he just falls right back down.  A few months ago he opened his eyes and the room was spinning.  He states it stayed for two to three weeks and was happening every morning.  The doctor did the [place] halpike to the left and it was positive.  He denies any major recent episodes but does still experience it periodically.  He also reports some butterflies in his stomach.  He will notice it some throughout the day as well.  He denies recent falls.  He denies use of an AD.  Pt was prescribed medication for dizziness but only took it once because it made him feel loopy.    Medical history: HTN, MI (with stents), lung cancer with lobectomy, trigger finger surgery    Pain  Aggravating factors: standing, ambulation and repetitive movement    Social Support  Lives in: multiple-level home  Lives with: spouse    Treatments  No previous or current treatments           Objective          Ambulation     Observational Gait   Gait: within functional limits   Walking speed and stride length within functional limits.     Functional Assessment     Comments  L [place] halpike: positive  R [place]os[place] halpike: negative  L horizontal canal test: negative  R horizontal canal test: negative    Smooth pursuits horizontal: WNL  Smooth pursuits vertical: slight dizziness  Saccades horizontal: WNL  Saccades vertical: WNL  VOR horizontal: WNL  VOR vertical: WNL  Head thrust test: NT  Head shaking nystagmus test: NT            See Exercise,  Manual, and Modality Logs for complete treatment.       Assessment & Plan     Assessment  Impairments: abnormal gait, activity intolerance, impaired balance, lacks appropriate home exercise program and safety issue  Functional Limitations: walking, moving in bed, standing and stooping  Assessment details: Pt presents with limitations, noted below, that impede his ability to stand up, walk, and perform functional activities.  The patient presents with a diagnosis of vertigo and has L BPPV and will benefit from therapeutic exercises, manual therapy, neuromuscular re-education, gait training, canalith repositioning maneuver, and modalities to improve tolerance to functional activities. Epley was performed twice to the L side today.  [place]ake halpike was negative after the epley.  The skills of a therapist will be required to safely and effectively implement the following treatment plan to restore maximal level of function.     Prognosis: good    Goals  Plan Goals: 1. Mobility: Walking/Moving Around Functional Limitation     LTG 1: 12 weeks:  The patient will demonstrate 4% limitation by achieving a score of 4 on the Dizziness Handicap Inventory.   STATUS:  New   STG 1a: 6 weeks:  The patient will demonstrate 8% limitation by achieving a score of 8 on the Dizziness Handicap Inventory.     STATUS:  New     TREATMENT:  Manual therapy, neuromuscular re-education, gait training, canalith repositioning maneuver, therapeutic exercise, home exercise instruction, and modalities as needed to include: moist heat, electrical stimulation, and ultrasound.             Plan  Therapy options: will be seen for skilled therapy services  Planned therapy interventions: balance/weight-bearing training, gait training, home exercise program, manual therapy, neuromuscular re-education, strengthening and therapeutic activities  Other planned therapy interventions: canalith repositioning maneuver  Frequency: 3x week  Duration in weeks: 12  Treatment  plan discussed with: patient        History # of Personal Factors and/or Comorbidities: MODERATE (1-2)  Examination of Body System(s): # of elements: LOW (1-2)  Clinical Presentation: STABLE   Clinical Decision Making: LOW       Timed:         Manual Therapy:         mins  91277;     Therapeutic Exercise:         mins  46497;     Neuromuscular Karen:        mins  00364;    Therapeutic Activity:          mins  24828;     Gait Training:           mins  95973;     Ultrasound:          mins  00865;    Ionto                                   mins   35471  Self Care                            mins   30508  Aquatic Therapy                 mins   21669      Un-Timed:  Electrical Stimulation:         mins  99969 ( );  Dry Needling          mins self-pay  Traction          mins 44915  Low Eval     15     Mins  90745  Mod Eval          Mins  30218  High Eval                            Mins  97109  Re-Eval                               mins  67907  Canalith Repos    10     mins 69518      Timed Treatment:   0   mins   Total Treatment:     25   mins    PT SIGNATURE: Electronically signed by GLADYS Middleton License: [id number]      Initial Certification  Certification Period: 9/7/2022 thru 12/5/2022  I certify that the therapy services are furnished while this patient is under my care.  The services outlined above are required by this patient, and will be reviewed every 90 days.     PHYSICIAN: Garrett Harrison APRN  NPI: [id number]                                      DATE:        Please sign and return via fax to [phone].Thank you, [facility].

## 2022-09-13 ENCOUNTER — APPOINTMENT (OUTPATIENT)
Dept: CT IMAGING | Facility: HOSPITAL | Age: 72
End: 2022-09-13

## 2022-09-16 ENCOUNTER — TREATMENT (OUTPATIENT)
Dept: PHYSICAL THERAPY | Facility: CLINIC | Age: 72
End: 2022-09-16

## 2022-09-16 DIAGNOSIS — R26.89 IMBALANCE: ICD-10-CM

## 2022-09-16 DIAGNOSIS — R42 DIZZINESS: Primary | ICD-10-CM

## 2022-09-16 PROCEDURE — 95992 CANALITH REPOSITIONING PROC: CPT | Performed by: PHYSICAL THERAPIST

## 2022-09-16 NOTE — PROGRESS NOTES
Physical Therapy Daily Treatment Note    VISIT#: 2    Subjective   Kamron Sanchez reports his dizziness/spinning has been much better.  He states he only has mild dizziness once or twice a week now when he gets out of bed.    Objective     See Exercise, Manual, and Modality Logs for complete treatment.     Assessment/Plan  Nina halpike is negative to the left but nystagmus is mild.  Epley maneuver was performed two times to the left side.  Will assess again next session.    Progress per Plan of Care and Progress strengthening /stabilization /functional activity            Timed:         Manual Therapy:         mins  02455;     Therapeutic Exercise:         mins  69470;     Neuromuscular Karen:        mins  12974;    Therapeutic Activity:          mins  35262;     Gait Training:           mins  90433;     Ultrasound:          mins  80315;    Ionto                                   mins   28728  Self Care                            mins   78346  Aquatic Therapy                 mins   12078    Un-Timed:  Electrical Stimulation:         mins  91673 ( );  Dry Needling          mins self-pay  Traction          mins 54364  Low Eval          Mins  66681  Mod Eval          Mins  21361  High Eval                            Mins  47678  Re-Eval                               mins  25768  Canalith Repos               10    mins  72335    Timed Treatment:   0   mins   Total Treatment:     10   mins    Earlene Henry PT, DPT  License Number [id number]

## 2022-09-26 ENCOUNTER — TREATMENT (OUTPATIENT)
Dept: PHYSICAL THERAPY | Facility: CLINIC | Age: 72
End: 2022-09-26

## 2022-09-26 DIAGNOSIS — R26.89 IMBALANCE: ICD-10-CM

## 2022-09-26 DIAGNOSIS — R42 DIZZINESS: Primary | ICD-10-CM

## 2022-09-26 NOTE — PROGRESS NOTES
Physical Therapy Daily Treatment Note/Discharge Summary    VISIT#: 3    Subjective   Kamron Sanchez reports he has only noticed lightheadedness when he first stands up in the morning.  He states his vision gets a little blurry for a second and then comes back.  He states his balance is better as well.  DHI: 4% limited     Objective   Nina halpike: negative bilaterally    See Exercise, Manual, and Modality Logs for complete treatment.     Assessment/Plan  [place] halpike is negative bilaterally today.  Pt has slight lightheadedness when first arising in the morning.  He was instructed to dangle his feet off the side of the bed and get up a little slower to try to avoid this.  He is ready for discharge from PT.      Goals  Plan Goals: 1. Mobility: Walking/Moving Around Functional Limitation                       LTG 1: 12 weeks:  The patient will demonstrate 4% limitation by achieving a score of 4 on the Dizziness Handicap Inventory.   STATUS:  met  STG 1a: 6 weeks:  The patient will demonstrate 8% limitation by achieving a score of 8 on the Dizziness Handicap Inventory.     STATUS:  met    Progress per Plan of Care and Progress strengthening /stabilization /functional activity            Timed:         Manual Therapy:         mins  25394;     Therapeutic Exercise:         mins  05321;     Neuromuscular Karen:        mins  52774;    Therapeutic Activity:          mins  55118;     Gait Training:           mins  09439;     Ultrasound:          mins  10985;    Ionto                                   mins   96236  Self Care                            mins   60907  Aquatic Therapy                 mins   16593    Un-Timed:  Electrical Stimulation:         mins  25097 ( );  Dry Needling          mins self-pay  Traction          mins 04137  Low Eval          Mins  01887  Mod Eval          Mins  51410  High Eval                            Mins  89619  Re-Eval                               mins  25808  [facility]                    mins  74924    Timed Treatment:   0   mins   Total Treatment:     0   mins    Earlene Henry PT, DPT  License Number [id number]

## 2022-09-30 ENCOUNTER — HOSPITAL ENCOUNTER (OUTPATIENT)
Dept: CT IMAGING | Facility: HOSPITAL | Age: 72
Discharge: HOME | End: 2022-09-30
Admitting: NURSE PRACTITIONER

## 2022-09-30 DIAGNOSIS — C34.90 NON-SMALL CELL LUNG CANCER, UNSPECIFIED LATERALITY: ICD-10-CM

## 2022-09-30 LAB
CREAT BLDA-MCNC: 1 MG/DL
EGFRCR SERPLBLD CKD-EPI 2021: 79.5 ML/MIN/1.73

## 2022-09-30 PROCEDURE — 82565 ASSAY OF CREATININE: CPT

## 2022-09-30 PROCEDURE — 71260 CT THORAX DX C+: CPT

## 2022-09-30 PROCEDURE — 0 IOPAMIDOL PER 1 ML: Performed by: NURSE PRACTITIONER

## 2022-09-30 RX ADMIN — IOPAMIDOL 100 ML: 755 INJECTION, SOLUTION INTRAVENOUS at 12:45

## 2022-10-04 ENCOUNTER — TELEPHONE (OUTPATIENT)
Dept: FAMILY MEDICINE CLINIC | Facility: CLINIC | Age: 72
End: 2022-10-04

## 2022-10-04 ENCOUNTER — OFFICE VISIT (OUTPATIENT)
Dept: ONCOLOGY | Facility: HOSPITAL | Age: 72
End: 2022-10-04

## 2022-10-04 ENCOUNTER — LAB (OUTPATIENT)
Dept: ONCOLOGY | Facility: HOSPITAL | Age: 72
End: 2022-10-04

## 2022-10-04 VITALS
BODY MASS INDEX: 25.18 KG/M2 | RESPIRATION RATE: 18 BRPM | DIASTOLIC BLOOD PRESSURE: 71 MMHG | TEMPERATURE: 97.2 F | WEIGHT: 180.56 LBS | HEART RATE: 69 BPM | OXYGEN SATURATION: 97 % | SYSTOLIC BLOOD PRESSURE: 127 MMHG

## 2022-10-04 DIAGNOSIS — D69.6 THROMBOCYTOPENIA: Primary | ICD-10-CM

## 2022-10-04 DIAGNOSIS — D69.6 THROMBOCYTOPENIA: ICD-10-CM

## 2022-10-04 DIAGNOSIS — C34.90 NON-SMALL CELL LUNG CANCER, UNSPECIFIED LATERALITY: ICD-10-CM

## 2022-10-04 PROBLEM — R07.9 CHEST PAIN: Status: ACTIVE | Noted: 2018-08-22

## 2022-10-04 PROBLEM — I25.5 ISCHEMIC CARDIOMYOPATHY: Status: ACTIVE | Noted: 2017-08-22

## 2022-10-04 LAB
BASOPHILS # BLD AUTO: 0.04 10*3/MM3 (ref 0–0.2)
BASOPHILS NFR BLD AUTO: 0.5 % (ref 0–1.5)
DEPRECATED RDW RBC AUTO: 47 FL (ref 37–54)
EOSINOPHIL # BLD AUTO: 0.1 10*3/MM3 (ref 0–0.4)
EOSINOPHIL NFR BLD AUTO: 1.2 % (ref 0.3–6.2)
ERYTHROCYTE [DISTWIDTH] IN BLOOD BY AUTOMATED COUNT: 13.1 % (ref 12.3–15.4)
HCT VFR BLD AUTO: 40.9 % (ref 37.5–51)
HGB BLD-MCNC: 13.3 G/DL (ref 13–17.7)
IMM GRANULOCYTES # BLD AUTO: 0.02 10*3/MM3 (ref 0–0.05)
IMM GRANULOCYTES NFR BLD AUTO: 0.2 % (ref 0–0.5)
LYMPHOCYTES # BLD AUTO: 4.62 10*3/MM3 (ref 0.7–3.1)
LYMPHOCYTES NFR BLD AUTO: 54.9 % (ref 19.6–45.3)
MCH RBC QN AUTO: 31 PG (ref 26.6–33)
MCHC RBC AUTO-ENTMCNC: 32.5 G/DL (ref 31.5–35.7)
MCV RBC AUTO: 95.3 FL (ref 79–97)
MONOCYTES # BLD AUTO: 1.13 10*3/MM3 (ref 0.1–0.9)
MONOCYTES NFR BLD AUTO: 13.4 % (ref 5–12)
NEUTROPHILS NFR BLD AUTO: 2.51 10*3/MM3 (ref 1.7–7)
NEUTROPHILS NFR BLD AUTO: 29.8 % (ref 42.7–76)
PLATELET # BLD AUTO: 52 10*3/MM3 (ref 140–450)
PMV BLD AUTO: 14.1 FL (ref 6–12)
RBC # BLD AUTO: 4.29 10*6/MM3 (ref 4.14–5.8)
WBC NRBC COR # BLD: 8.42 10*3/MM3 (ref 3.4–10.8)

## 2022-10-04 PROCEDURE — 85025 COMPLETE CBC W/AUTO DIFF WBC: CPT

## 2022-10-04 PROCEDURE — 99213 OFFICE O/P EST LOW 20 MIN: CPT | Performed by: NURSE PRACTITIONER

## 2022-10-04 PROCEDURE — 36415 COLL VENOUS BLD VENIPUNCTURE: CPT

## 2022-10-04 PROCEDURE — G0463 HOSPITAL OUTPT CLINIC VISIT: HCPCS | Performed by: NURSE PRACTITIONER

## 2022-10-04 RX ORDER — CLOPIDOGREL BISULFATE 75 MG/1
75 TABLET ORAL DAILY
COMMUNITY
Start: 2022-09-15 | End: 2023-01-06

## 2022-10-04 RX ORDER — NITROGLYCERIN 0.4 MG/1
0.4 TABLET SUBLINGUAL
COMMUNITY
Start: 2022-09-15 | End: 2023-09-15

## 2022-10-04 RX ORDER — ATORVASTATIN CALCIUM 40 MG/1
40 TABLET, FILM COATED ORAL
COMMUNITY
Start: 2022-09-15 | End: 2023-01-06 | Stop reason: SDUPTHER

## 2022-10-04 NOTE — PROGRESS NOTES
Chief Complaint  Lung Cancer surveillance.     Christy, Garrett An*  Christy, Garrett Dianne, APRN      Subjective          Kamron Sanchez presents to [facility] for  NSCLC    History of Present Illness   Mr. Kamron Sanchez presents for 1 year follow up for RUL NSCLC in April of 2016. He is status post RUL lobectomy with invasive adenocarcinoma with Dr. Heller from .     He is followed yearly for CT scans. He also has has thrombocytocytopenia on previous labs in the past. He reports today, his cardiologist told him, his low platelet count may be secondary to Plavix use as well and reports to me his cardiologist states he could easily change this to a baby ASA instead.       Cancer Staging  No matching staging information was found for the patient.     Treatment intent: curative    Oncology/Hematology History    No history exists.     1) RUL NSCLC: -Right upper lobe lobectomy 4/6/16:  Staging: pT2aN0. Path:     Invasive adenocarcinoma, grade 3, 2 cm in size. Visceral pleura invasion with     lymphvascular invasion. Margins neg, vascular neg, neg parenchymal. 0 of 15 LN's    negative in [place] with Dr. Heller.             Treatment history:       -1/26/16: Patient presented with right shoulder pain and this included a chest     x-ray on 01/26 which showed an ill defined 3 cm opacity in the right lung base.           2/8/16:  Chest CT showed a 1.3 cm spiculated pleural based nodule anteriorly in     the right upper lobe suspicious for malignancy.  Also noted on the CT scan was     adenopathy adjacent to the GE junction measuring 2 cm and PET scan was     recommended.        2/15/16: PET scan showed hypermetabolic uptake in the right upper lobe pulmonary    nodule with a maximum SUV of 4.6.  There was a small focus of activity in the jseus    bcarinal region that was not significantly above the mediastinal blood pool     activity and was felt to be unlikely to represent  metastatic disease.  The area     of suspected adenopathy that was seen near the GE junction on CT was not     hypermetabolic on PET.  There was a sclerotic lesion noted in the right iliac     bone which was also not hypermetabolic and was felt unlikely to represent met    astatic lesion.  The patient was referred to oncology for further     recommendations. At the time of his initial visit, he was referred for biopsy of    the nodule.  He was complaining of some periodic headaches and MRI brain was     ordered.  There were no findings to suggest intracranial metastasis.  He     underwent biopsy of the RUL lesion on 3/2.  This was positive for adenocarcinoma    and also stained positive for CDX2, raising concern for GI primary.  He was     referred for upper and lower endoscopy and underwent these on 3/10 under the     care of Dr. España.  Pathology from a stomach biopsy showed chronic gastritis     and staining was positive for H pylori.  No evidence of GI malignancy was found     on EGD or colonoscopy.  He was referred to  for determination of his surgical     candidacy and underwent surgery on 4/6 with Dr. Macarena Heller.      -4/6/16:  RUL lobectomy under the care of Dr. Heller. Path: RUL lobectomy -    invasive adenocarcinoma, grade 3 (pT2aN0). -tumor size 2cm -histologic type:     adenocarcinoma -INVASION OF VISCERAL PLEURA. LYMPHOVASCULAR INVASION. Margins: -    bronchial: negative -vascular: negative -parenchymal: negative -distance to     closest margin: 1.8cm (bronchial). Ratio of positive/total nodes: 0/15.       -4/6/16.Adjuvant chemotherapy was recommended due to high risk features.      Declined chemotherapy.       -12/9/16: PET/CT showed postsurgical changes status post interval right upper     lobectomy with removal of right upper lobe cancer. No evidence of local     recurrence or thoracic lymphadenopathy. No PET/CT evidence of metastasis within     neck, abdomen, or pelvis.      -July 2017.  Chest  CT showed a decrease in size of the mediastinal and hilar     lymph nodes from 12/09/2016.  No convincing evidence of metastatic disease      -12/27/17: CT chest at the [facility] showed no evidence of cancer.  12    mm subcarinal lymph node was 15 mm previously.      -8/1/2019: No evidence of local recurrence or metastasis within the CAP.    8/2020: CT chest: No evidence of recurrence or metastasis.     8/2021: CT chest: no evidence of recurrence or metastasis.   Review of Systems   Constitutional: Positive for fatigue. Negative for appetite change, diaphoresis, fever, unexpected weight gain and unexpected weight loss.   HENT: Negative for hearing loss, sore throat and voice change.    Eyes: Negative for blurred vision, double vision, pain, redness and visual disturbance.   Respiratory: Negative for cough, shortness of breath and wheezing.    Cardiovascular: Negative for chest pain, palpitations and leg swelling.   Endocrine: Negative for cold intolerance, heat intolerance, polydipsia and polyuria.   Genitourinary: Negative for decreased urine volume, difficulty urinating, frequency and urinary incontinence.   Musculoskeletal: Negative for arthralgias, back pain, joint swelling and myalgias.   Skin: Negative for color change, rash, skin lesions and wound.   Neurological: Negative for dizziness, seizures, numbness and headache.   Hematological: Negative for adenopathy. Does not bruise/bleed easily.   Psychiatric/Behavioral: Negative for depressed mood. The patient is not nervous/anxious.    All other systems reviewed and are negative.      Current Outpatient Medications on File Prior to Visit   Medication Sig Dispense Refill   • albuterol sulfate  (90 Base) MCG/ACT inhaler ProAir HFA 90 mcg/actuation aerosol inhaler     • ascorbic acid (VITAMIN C) 1000 MG tablet Vitamin C 1,000 mg oral tablet take 1 tablet by oral route daily   Active     • atorvastatin (LIPITOR) 40 MG tablet      • atorvastatin  (LIPITOR) 40 MG tablet Take 40 mg by mouth.     • Cetirizine HCl 10 MG capsule Take 10 mg by mouth every night at bedtime. 90 capsule 1   • Cholecalciferol 50 MCG (2000 UT) tablet Take 1 tablet by mouth Daily. 90 each 1   • clopidogrel (PLAVIX) 75 MG tablet      • clopidogrel (PLAVIX) 75 MG tablet Take 75 mg by mouth Daily.     • cyclobenzaprine (FLEXERIL) 10 MG tablet Take 1 tablet by mouth 3 (Three) Times a Day As Needed for Muscle Spasms. 90 tablet 1   • Diclofenac Sodium (VOLTAREN) 1 % gel gel Apply 4 g topically to the appropriate area as directed 4 (Four) Times a Day As Needed (joint). 350 g 1   • fluorouracil (EFUDEX) 5 % cream fluorouracil 5 % topical cream apply a sufficient amount to cover the lesions in the affected area(s) by topical route 2 times per day   Active     • fluticasone (FLONASE) 50 MCG/ACT nasal spray 2 sprays into the nostril(s) as directed by provider Daily. 2 sprays each nostril daily 3 mL 1   • gabapentin (NEURONTIN) 300 MG capsule Take 1 capsule by mouth 3 (Three) Times a Day. 270 capsule 1   • GARLIC PO garlic 1,000 mg oral capsule take 1 capsule by oral route daily   Active     • isosorbide mononitrate (IMDUR) 60 MG 24 hr tablet      • meclizine (ANTIVERT) 25 MG tablet Take 1 tablet by mouth 3 (Three) Times a Day As Needed for Dizziness. 90 tablet 5   • metoprolol succinate XL (TOPROL-XL) 50 MG 24 hr tablet      • nitroglycerin (NITROSTAT) 0.3 MG SL tablet nitroglycerin 0.3 mg sublingual tablet, sublingual place 1 tablet (0.3 mg) by sublingual route at the first sign of an attack; no more than 3 tabs are recommended within a 15 minute period.   Active     • nitroglycerin (NITROSTAT) 0.4 MG SL tablet Place 0.4 mg under the tongue.     • tamsulosin (FLOMAX) 0.4 MG capsule 24 hr capsule Take 1 capsule by mouth Daily. 90 capsule 1     No current facility-administered medications on file prior to visit.       No Known Allergies  Past Medical History:   Diagnosis Date   • Abnormal blood  chemistry 2014    Elevated RBC count   • Arthritis 2014   • Arthropathy, unspecified     multiple sites   • Cancer (HCC)    • Cervicogenic headache 2019   • Emphysema of lung (HCC) 2016   • Heart attack (HCC)    • Hepatitis C    • Hyperlipemia    • Hyperlipidemia 2014   • Hypertension 2014   • Impaired fasting glucose 2014   • Lung cancer (HCC)    • Lung disease    • Lung nodule seen on imaging study 2016   • Myocardial infarction (HCC)    • Shortness of breath    • Tobacco abuse 2016     Past Surgical History:   Procedure Laterality Date   • CARDIAC SURGERY     • CARDIAC VALVE SURGERY     • COLONOSCOPY     • CORONARY STENT PLACEMENT     • FINGER SURGERY     • HAND SURGERY     • HERNIA REPAIR     • SKIN CANCER EXCISION     • TONSILLECTOMY       Social History     Socioeconomic History   • Marital status:    Tobacco Use   • Smoking status: Former Smoker     Packs/day: 2.00     Years: 31.00     Pack years: 62.00     Start date:      Quit date:      Years since quittin.7   • Smokeless tobacco: Never Used   Vaping Use   • Vaping Use: Never used   Substance and Sexual Activity   • Alcohol use: Never   • Drug use: Never   • Sexual activity: Defer     Family History   Problem Relation Age of Onset   • Cancer Mother    • Diabetes Father    • Arthritis Father    • Cancer Father    • Stroke Other    • Breast cancer Other      Immunization History   Administered Date(s) Administered   • COVID-19 (MODERNA) 1st, 2nd, 3rd Dose Only 2021, 2021   • COVID-19 (MODERNA) BOOSTER 2021   • Influenza, Unspecified 2021   • Pneumococcal Polysaccharide (PPSV23) 2021   • Tdap 2018       Objective   Physical Exam  Vitals and nursing note reviewed.   Constitutional:       Appearance: Normal appearance. He is normal weight.   HENT:      Head: Normocephalic.      Nose: Nose normal.      Mouth/Throat:      Mouth: Mucous membranes are moist.    Eyes:      Pupils: Pupils are equal, round, and reactive to light.   Cardiovascular:      Rate and Rhythm: Normal rate and regular rhythm.      Pulses: Normal pulses.      Heart sounds: Normal heart sounds. No murmur heard.  Pulmonary:      Effort: Pulmonary effort is normal. No respiratory distress.      Breath sounds: Normal breath sounds. No wheezing, rhonchi or rales.   Abdominal:      General: Bowel sounds are normal. There is no distension.      Palpations: Abdomen is soft.   Musculoskeletal:         General: Normal range of motion.      Cervical back: Normal range of motion and neck supple.   Skin:     General: Skin is warm and dry.      Capillary Refill: Capillary refill takes less than 2 seconds.   Neurological:      General: No focal deficit present.      Mental Status: He is alert and oriented to person, place, and time.   Psychiatric:         Mood and Affect: Mood normal.         Behavior: Behavior normal.         Thought Content: Thought content normal.         Judgment: Judgment normal.         Vitals:    10/04/22 1126   BP: 127/71   Pulse: 69   Resp: 18   Temp: 97.2 °F (36.2 °C)   SpO2: 97%   Weight: 81.9 kg (180 lb 8.9 oz)   PainSc: 0-No pain     ECOG score: 0         ECOG: (0) Fully Active - Able to Carry On All Pre-disease Performance Without Restriction  Fall Risk Assessment was completed, and patient is at low risk for falls.  PHQ-9 Total Score:         The patient is  experiencing fatigue. Fatigue score: 2    PT/OT Functional Screening: PT fx screen: No needs identified  Speech Functional Screening: Speech fx screen: No needs identified  Rehab to be ordered: Rehab to be ordered: No needs identified        Result Review :   The following data was reviewed by: GLORIA Hutchins on 10/04/2022:  Lab Results   Component Value Date    HGB 13.3 10/04/2022    HCT 40.9 10/04/2022    MCV 95.3 10/04/2022    PLT 52 (L) 10/04/2022    WBC 8.42 10/04/2022    NEUTROABS 2.51 10/04/2022    LYMPHSABS  4.62 (H) 10/04/2022    MONOSABS 1.13 (H) 10/04/2022    EOSABS 0.10 10/04/2022    BASOSABS 0.04 10/04/2022     Lab Results   Component Value Date    GLUCOSE 112 (H) 2022    BUN 18 2022    CREATININE 1.00 2022     2022    K 4.5 2022     2022    CO2 28.6 2022    CALCIUM 8.9 2022    PROTEINTOT 6.2 2022    ALBUMIN 3.90 2022    BILITOT 0.4 2022    ALKPHOS 90 2022    AST 13 2022    ALT 14 2022     [email or web address] [email or web address] [place] [phone] V-AL               PACS Images     Radiology Images    Study Result    Narrative & Impression   PROCEDURE:  CT CHEST W CONTRAST DIAGNOSTIC     COMPARISON:  [facility], CT, CT CHEST W CONTRAST DIAGNOSTIC, 2021,   10:56.     INDICATIONS:  Lung cancer surveillance     TECHNIQUE:    After obtaining the patient's consent, CT images were obtained with non-ionic   intravenous contrast material.       PROTOCOL:     Standard imaging protocol performed                 RADIATION:      DLP: 360 mGy*cm               Automated exposure control was utilized to minimize radiation dose.   CONTRAST:      100 cc Isovue 370 I.V.  LABS:   eGFR: >60 ml/min/1.73m2     FINDINGS:          The visualized soft tissue structures at the base of the neck including the thyroid appear within   normal limits.  There is no lower cervical or axillary adenopathy.     The heart size is normal.  There is no pericardial effusion.  The aorta is normal in caliber   without evidence of aneurysm formation.  There is coronary artery atherosclerotic calcification.    The main pulmonary artery is normal in caliber.  There are no filling defects within the pulmonary   arterial system to suggest presence of underlying pulmonary embolism.     There are postsurgical changes of prior right upper lobectomy.  There is stable mediastinal and   hilar lymph nodes.  The largest is subcarinal measuring 1.5 cm in short axis.   The esophagus is   normal in course and caliber.       The central airways are patent.  There is linear scarring within the right apex which appears   stable from prior.  There is upper lobe predominant centrilobular and paraseptal emphysema.  There   are areas of peripheral reticulation and interstitial thickening.  There is no pleural effusion or   pneumothorax.     Visualized portions of the upper abdomen demonstrate no acute findings.  There is no evidence of   adrenal or liver mass.  There are multilevel degenerative changes of the thoracic spine.  There is   anterior wedging compression deformity at the T7 level, unchanged from prior.  There are no   suspicious lytic or sclerotic osseous lesions.     IMPRESSION:                 1. Postsurgical changes of prior right upper lobectomy.  2. Stable right apical scarring.  No suspicious pulmonary nodules.  3. Moderate centrilobular and paraseptal emphysema.  4. Stable mediastinal and hilar lymph nodes.              KUSH HOLLIS MD         Electronically Signed and Approved By: KUSH HOLLIS MD on 9/30/2022 at 14:30              Assessment and Plan    Diagnoses and all orders for this visit:    1. Thrombocytopenia (HCC) (Primary)  -     CBC & Differential; Future  -     CBC & Differential; Future  -     CBC & Differential; Future  -     Platelet Ct On Citrated Bld; Future    2. Non-small cell lung cancer, unspecified laterality (HCC)  -     CT Chest With Contrast; Future    Reviewed CT scan with patient which does not show any recurrent disease. His platelet count is 52,000. I have to leave a VM for Mr. Sanchez to discuss changing his Plavix to a baby ASA. I would like recheck his CBC in 1 month to see his platelet count improves. We will also check platelet count as well in a citrated top to see if he has any platelet clumping. I have spoken with Mr. Sanchez and discussed this plan of care. He will call his cardiology to discuss his plavix use.       Repeat CT  scan of chest in 1 year.         Patient Follow Up: 1 year with MD  Patient was given instructions and counseling regarding his condition or for health maintenance advice. Please see specific information pulled into the AVS if appropriate.     Jie Churchill, APRN    10/4/2022

## 2022-10-05 ENCOUNTER — TELEPHONE (OUTPATIENT)
Dept: ONCOLOGY | Facility: HOSPITAL | Age: 72
End: 2022-10-05

## 2022-10-05 NOTE — TELEPHONE ENCOUNTER
Cornelio to Tell:  Called patient to let him know that we scheduled him for labs in 1 month.  Appt 11/7/2022 at 10am arrive at 9:45am tsering

## 2022-11-07 ENCOUNTER — LAB (OUTPATIENT)
Dept: ONCOLOGY | Facility: HOSPITAL | Age: 72
End: 2022-11-07

## 2022-11-07 DIAGNOSIS — D69.6 THROMBOCYTOPENIA: ICD-10-CM

## 2022-11-07 LAB
BASOPHILS # BLD AUTO: 0.03 10*3/MM3 (ref 0–0.2)
BASOPHILS NFR BLD AUTO: 0.3 % (ref 0–1.5)
DEPRECATED RDW RBC AUTO: 46.6 FL (ref 37–54)
EOSINOPHIL # BLD AUTO: 0.13 10*3/MM3 (ref 0–0.4)
EOSINOPHIL NFR BLD AUTO: 1.5 % (ref 0.3–6.2)
ERYTHROCYTE [DISTWIDTH] IN BLOOD BY AUTOMATED COUNT: 13 % (ref 12.3–15.4)
HCT VFR BLD AUTO: 40.4 % (ref 37.5–51)
HGB BLD-MCNC: 13.1 G/DL (ref 13–17.7)
IMM GRANULOCYTES # BLD AUTO: 0.01 10*3/MM3 (ref 0–0.05)
IMM GRANULOCYTES NFR BLD AUTO: 0.1 % (ref 0–0.5)
LYMPHOCYTES # BLD AUTO: 4.33 10*3/MM3 (ref 0.7–3.1)
LYMPHOCYTES NFR BLD AUTO: 50.2 % (ref 19.6–45.3)
MCH RBC QN AUTO: 31 PG (ref 26.6–33)
MCHC RBC AUTO-ENTMCNC: 32.4 G/DL (ref 31.5–35.7)
MCV RBC AUTO: 95.7 FL (ref 79–97)
MONOCYTES # BLD AUTO: 1.15 10*3/MM3 (ref 0.1–0.9)
MONOCYTES NFR BLD AUTO: 13.3 % (ref 5–12)
NEUTROPHILS NFR BLD AUTO: 2.97 10*3/MM3 (ref 1.7–7)
NEUTROPHILS NFR BLD AUTO: 34.6 % (ref 42.7–76)
PLATELET # BLD AUTO: 53 10*3/MM3 (ref 140–450)
PLATELETS (CITRATED) BY AUTOMATED COUNT: 73 10*3/MM3 (ref 140–450)
PMV BLD AUTO: 12.9 FL (ref 6–12)
RBC # BLD AUTO: 4.22 10*6/MM3 (ref 4.14–5.8)
WBC NRBC COR # BLD: 8.62 10*3/MM3 (ref 3.4–10.8)

## 2022-11-07 PROCEDURE — 85025 COMPLETE CBC W/AUTO DIFF WBC: CPT

## 2022-11-07 PROCEDURE — 36415 COLL VENOUS BLD VENIPUNCTURE: CPT

## 2022-11-07 PROCEDURE — 85049 AUTOMATED PLATELET COUNT: CPT

## 2022-11-10 ENCOUNTER — TELEPHONE (OUTPATIENT)
Dept: ONCOLOGY | Facility: HOSPITAL | Age: 72
End: 2022-11-10

## 2022-11-10 NOTE — PROGRESS NOTES
Please call the patient regarding his abnormal result.     Left VM for patient related to his platelet count result. We discussed last month at his appt he would discuss with his cardiologist to switch from plavix to baby ASA. He is to call and let us know if he is taking baby ASA.

## 2023-01-06 ENCOUNTER — OFFICE VISIT (OUTPATIENT)
Dept: FAMILY MEDICINE CLINIC | Facility: CLINIC | Age: 73
End: 2023-01-06
Payer: MEDICARE

## 2023-01-06 VITALS
HEIGHT: 71 IN | WEIGHT: 182 LBS | HEART RATE: 61 BPM | SYSTOLIC BLOOD PRESSURE: 140 MMHG | OXYGEN SATURATION: 100 % | TEMPERATURE: 96.9 F | DIASTOLIC BLOOD PRESSURE: 88 MMHG | BODY MASS INDEX: 25.48 KG/M2

## 2023-01-06 DIAGNOSIS — Z13.29 SCREENING FOR THYROID DISORDER: ICD-10-CM

## 2023-01-06 DIAGNOSIS — I10 PRIMARY HYPERTENSION: ICD-10-CM

## 2023-01-06 DIAGNOSIS — Z79.899 MEDICATION MANAGEMENT: ICD-10-CM

## 2023-01-06 DIAGNOSIS — M54.81 CERVICO-OCCIPITAL NEURALGIA: ICD-10-CM

## 2023-01-06 DIAGNOSIS — D69.6 THROMBOCYTOPENIA: ICD-10-CM

## 2023-01-06 DIAGNOSIS — M50.30 DDD (DEGENERATIVE DISC DISEASE), CERVICAL: ICD-10-CM

## 2023-01-06 DIAGNOSIS — J43.9 PULMONARY EMPHYSEMA, UNSPECIFIED EMPHYSEMA TYPE: ICD-10-CM

## 2023-01-06 DIAGNOSIS — I25.10 CORONARY ARTERY DISEASE INVOLVING NATIVE HEART WITHOUT ANGINA PECTORIS, UNSPECIFIED VESSEL OR LESION TYPE: ICD-10-CM

## 2023-01-06 DIAGNOSIS — E78.2 MIXED HYPERLIPIDEMIA: Primary | ICD-10-CM

## 2023-01-06 DIAGNOSIS — R73.01 IMPAIRED FASTING GLUCOSE: ICD-10-CM

## 2023-01-06 DIAGNOSIS — M47.812 CERVICAL SPONDYLOSIS WITHOUT MYELOPATHY: ICD-10-CM

## 2023-01-06 LAB
ALBUMIN SERPL-MCNC: 3.8 G/DL (ref 3.5–5.2)
ALBUMIN UR-MCNC: <1.2 MG/DL
ALBUMIN/GLOB SERPL: 1.2 G/DL
ALP SERPL-CCNC: 83 U/L (ref 39–117)
ALT SERPL W P-5'-P-CCNC: 18 U/L (ref 1–41)
AMPHET+METHAMPHET UR QL: NEGATIVE
AMPHETAMINE INTERNAL CONTROL: NORMAL
AMPHETAMINES UR QL: NEGATIVE
ANION GAP SERPL CALCULATED.3IONS-SCNC: 4.1 MMOL/L (ref 5–15)
AST SERPL-CCNC: 16 U/L (ref 1–40)
BARBITURATE INTERNAL CONTROL: NORMAL
BARBITURATES UR QL SCN: NEGATIVE
BENZODIAZ UR QL SCN: NEGATIVE
BENZODIAZEPINE INTERNAL CONTROL: NORMAL
BILIRUB SERPL-MCNC: 0.5 MG/DL (ref 0–1.2)
BILIRUB UR QL STRIP: NEGATIVE
BUN SERPL-MCNC: 24 MG/DL (ref 8–23)
BUN/CREAT SERPL: 26.1 (ref 7–25)
BUPRENORPHINE INTERNAL CONTROL: NORMAL
BUPRENORPHINE SERPL-MCNC: NEGATIVE NG/ML
BURR CELLS BLD QL SMEAR: ABNORMAL
CALCIUM SPEC-SCNC: 8.9 MG/DL (ref 8.6–10.5)
CANNABINOIDS SERPL QL: NEGATIVE
CHLORIDE SERPL-SCNC: 106 MMOL/L (ref 98–107)
CHOLEST SERPL-MCNC: 132 MG/DL (ref 0–200)
CLARITY UR: CLEAR
CO2 SERPL-SCNC: 28.9 MMOL/L (ref 22–29)
COCAINE INTERNAL CONTROL: NORMAL
COCAINE UR QL: NEGATIVE
COLOR UR: YELLOW
CREAT SERPL-MCNC: 0.92 MG/DL (ref 0.76–1.27)
CREAT UR-MCNC: 64.1 MG/DL
DEPRECATED RDW RBC AUTO: 41.4 FL (ref 37–54)
EGFRCR SERPLBLD CKD-EPI 2021: 87.8 ML/MIN/1.73
EOSINOPHIL # BLD MANUAL: 0.26 10*3/MM3 (ref 0–0.4)
EOSINOPHIL NFR BLD MANUAL: 3.1 % (ref 0.3–6.2)
ERYTHROCYTE [DISTWIDTH] IN BLOOD BY AUTOMATED COUNT: 12.4 % (ref 12.3–15.4)
EXPIRATION DATE: NORMAL
GLOBULIN UR ELPH-MCNC: 3.1 GM/DL
GLUCOSE SERPL-MCNC: 109 MG/DL (ref 65–99)
GLUCOSE UR STRIP-MCNC: NEGATIVE MG/DL
HBA1C MFR BLD: 6.1 % (ref 4.8–5.6)
HCT VFR BLD AUTO: 40 % (ref 37.5–51)
HDLC SERPL-MCNC: 46 MG/DL (ref 40–60)
HGB BLD-MCNC: 13.7 G/DL (ref 13–17.7)
HGB UR QL STRIP.AUTO: NEGATIVE
KETONES UR QL STRIP: NEGATIVE
LDLC SERPL CALC-MCNC: 72 MG/DL (ref 0–100)
LDLC/HDLC SERPL: 1.57 {RATIO}
LEUKOCYTE ESTERASE UR QL STRIP.AUTO: NEGATIVE
LYMPHOCYTES # BLD MANUAL: 3.6 10*3/MM3 (ref 0.7–3.1)
LYMPHOCYTES NFR BLD MANUAL: 13.4 % (ref 5–12)
Lab: NORMAL
MCH RBC QN AUTO: 31.5 PG (ref 26.6–33)
MCHC RBC AUTO-ENTMCNC: 34.3 G/DL (ref 31.5–35.7)
MCV RBC AUTO: 92 FL (ref 79–97)
MDMA (ECSTASY) INTERNAL CONTROL: NORMAL
MDMA UR QL SCN: NEGATIVE
METHADONE INTERNAL CONTROL: NORMAL
METHADONE UR QL SCN: NEGATIVE
METHAMPHETAMINE INTERNAL CONTROL: NORMAL
MICROALBUMIN/CREAT UR: NORMAL MG/G{CREAT}
MONOCYTES # BLD: 1.14 10*3/MM3 (ref 0.1–0.9)
NEUTROPHILS # BLD AUTO: 3.51 10*3/MM3 (ref 1.7–7)
NEUTROPHILS NFR BLD MANUAL: 41.2 % (ref 42.7–76)
NITRITE UR QL STRIP: NEGATIVE
OPIATES INTERNAL CONTROL: NORMAL
OPIATES UR QL: NEGATIVE
OXYCODONE INTERNAL CONTROL: NORMAL
OXYCODONE UR QL SCN: NEGATIVE
PCP UR QL SCN: NEGATIVE
PH UR STRIP.AUTO: 7.5 [PH] (ref 5–8)
PHENCYCLIDINE INTERNAL CONTROL: NORMAL
PLAT MORPH BLD: NORMAL
PLATELET # BLD AUTO: 84 10*3/MM3 (ref 140–450)
PMV BLD AUTO: 12.2 FL (ref 6–12)
POIKILOCYTOSIS BLD QL SMEAR: ABNORMAL
POTASSIUM SERPL-SCNC: 4.4 MMOL/L (ref 3.5–5.2)
PROT SERPL-MCNC: 6.9 G/DL (ref 6–8.5)
PROT UR QL STRIP: NEGATIVE
RBC # BLD AUTO: 4.35 10*6/MM3 (ref 4.14–5.8)
SMUDGE CELLS BLD QL SMEAR: ABNORMAL
SODIUM SERPL-SCNC: 139 MMOL/L (ref 136–145)
SP GR UR STRIP: 1.02 (ref 1–1.03)
THC INTERNAL CONTROL: NORMAL
TRIGL SERPL-MCNC: 70 MG/DL (ref 0–150)
TSH SERPL DL<=0.05 MIU/L-ACNC: 2.07 UIU/ML (ref 0.27–4.2)
UROBILINOGEN UR QL STRIP: NORMAL
VARIANT LYMPHS NFR BLD MANUAL: 42.3 % (ref 19.6–45.3)
VLDLC SERPL-MCNC: 14 MG/DL (ref 5–40)
WBC NRBC COR # BLD: 8.52 10*3/MM3 (ref 3.4–10.8)

## 2023-01-06 PROCEDURE — 99214 OFFICE O/P EST MOD 30 MIN: CPT | Performed by: NURSE PRACTITIONER

## 2023-01-06 PROCEDURE — 80053 COMPREHEN METABOLIC PANEL: CPT | Performed by: NURSE PRACTITIONER

## 2023-01-06 PROCEDURE — 85025 COMPLETE CBC W/AUTO DIFF WBC: CPT | Performed by: NURSE PRACTITIONER

## 2023-01-06 PROCEDURE — 36415 COLL VENOUS BLD VENIPUNCTURE: CPT | Performed by: NURSE PRACTITIONER

## 2023-01-06 PROCEDURE — 81003 URINALYSIS AUTO W/O SCOPE: CPT | Performed by: NURSE PRACTITIONER

## 2023-01-06 PROCEDURE — 82570 ASSAY OF URINE CREATININE: CPT | Performed by: NURSE PRACTITIONER

## 2023-01-06 PROCEDURE — 80061 LIPID PANEL: CPT | Performed by: NURSE PRACTITIONER

## 2023-01-06 PROCEDURE — 82043 UR ALBUMIN QUANTITATIVE: CPT | Performed by: NURSE PRACTITIONER

## 2023-01-06 PROCEDURE — 83036 HEMOGLOBIN GLYCOSYLATED A1C: CPT | Performed by: NURSE PRACTITIONER

## 2023-01-06 PROCEDURE — 85007 BL SMEAR W/DIFF WBC COUNT: CPT | Performed by: NURSE PRACTITIONER

## 2023-01-06 PROCEDURE — 80305 DRUG TEST PRSMV DIR OPT OBS: CPT | Performed by: NURSE PRACTITIONER

## 2023-01-06 PROCEDURE — 84443 ASSAY THYROID STIM HORMONE: CPT | Performed by: NURSE PRACTITIONER

## 2023-01-06 RX ORDER — CYCLOBENZAPRINE HCL 10 MG
10 TABLET ORAL 3 TIMES DAILY PRN
Qty: 90 TABLET | Refills: 1 | Status: SHIPPED | OUTPATIENT
Start: 2023-01-06

## 2023-01-06 RX ORDER — ATORVASTATIN CALCIUM 40 MG/1
40 TABLET, FILM COATED ORAL NIGHTLY
Qty: 90 TABLET | Refills: 1 | Status: SHIPPED | OUTPATIENT
Start: 2023-01-06 | End: 2023-07-05

## 2023-01-06 RX ORDER — FLUTICASONE PROPIONATE 50 MCG
2 SPRAY, SUSPENSION (ML) NASAL DAILY
Qty: 3 ML | Refills: 1 | Status: SHIPPED | OUTPATIENT
Start: 2023-01-06

## 2023-01-06 RX ORDER — ASPIRIN 81 MG/1
81 TABLET ORAL DAILY
COMMUNITY

## 2023-01-06 RX ORDER — GABAPENTIN 300 MG/1
300 CAPSULE ORAL 3 TIMES DAILY
Qty: 270 CAPSULE | Refills: 1 | Status: SHIPPED | OUTPATIENT
Start: 2023-01-06

## 2023-01-06 RX ORDER — TAMSULOSIN HYDROCHLORIDE 0.4 MG/1
1 CAPSULE ORAL DAILY
Qty: 90 CAPSULE | Refills: 1 | Status: SHIPPED | OUTPATIENT
Start: 2023-01-06

## 2023-01-06 NOTE — PROGRESS NOTES
Follow Up Office Visit      Patient Name: Kamron Sanchez  : 1950   MRN: [id number]     Chief Complaint:    Chief Complaint   Patient presents with   • Hypertension   • Hyperlipidemia   • Insomnia   • impiared fasting glucose       History of Present Illness: Kamron Sanchez is a 72 y.o. male who is here today to follow up for HTN, hyperlipidemia, IFG, insomnia and lung cancer  Review lab results from hematology    plavix stopped by cardiology replaced with asa 81 mg daily     psa- 2022  Colonoscopy- Has cologuard kit at home.    Requests a refill of gabapentin for chronic neck pain     Subjective      Review of Systems:   Review of Systems   Constitutional: Negative for fever.   HENT: Negative for ear pain and sore throat.    Respiratory: Positive for shortness of breath. Negative for cough.    Cardiovascular: Negative for chest pain.   Gastrointestinal: Negative for diarrhea, nausea and vomiting.   Genitourinary: Negative for dysuria.   Musculoskeletal: Positive for neck pain. Negative for myalgias.   Neurological: Negative for headaches.        Past Medical History:   Past Medical History:   Diagnosis Date   • Abnormal blood chemistry 2014    Elevated RBC count   • Arthritis 2014   • Arthropathy, unspecified     multiple sites   • Cancer (HCC)    • Cervicogenic headache 2019   • Emphysema of lung (HCC) 2016   • Heart attack (HCC)    • Hepatitis C    • Hyperlipemia    • Hyperlipidemia 2014   • Hypertension 2014   • Impaired fasting glucose 2014   • Lung cancer (HCC)    • Lung disease    • Lung nodule seen on imaging study 2016   • Myocardial infarction (HCC)    • Shortness of breath    • Tobacco abuse 2016       Past Surgical History:   Past Surgical History:   Procedure Laterality Date   • CARDIAC SURGERY     • CARDIAC VALVE SURGERY     • COLONOSCOPY     • CORONARY STENT PLACEMENT     • FINGER SURGERY     • HAND SURGERY     • HERNIA REPAIR     • SKIN  CANCER EXCISION     • TONSILLECTOMY         Family History:   Family History   Problem Relation Age of Onset   • Cancer Mother    • Diabetes Father    • Arthritis Father    • Cancer Father    • Stroke Other    • Breast cancer Other        Social History:   Social History     Socioeconomic History   • Marital status:    Tobacco Use   • Smoking status: Former     Packs/day: 2.00     Years: 31.00     Pack years: 62.00     Types: Cigarettes     Start date:      Quit date:      Years since quittin.0   • Smokeless tobacco: Never   Vaping Use   • Vaping Use: Never used   Substance and Sexual Activity   • Alcohol use: Never   • Drug use: Never   • Sexual activity: Defer       Medications:     Current Outpatient Medications:   •  albuterol sulfate  (90 Base) MCG/ACT inhaler, ProAir HFA 90 mcg/actuation aerosol inhaler, Disp: , Rfl:   •  ascorbic acid (VITAMIN C) 1000 MG tablet, Vitamin C 1,000 mg oral tablet take 1 tablet by oral route daily   Active, Disp: , Rfl:   •  aspirin 81 MG EC tablet, Take 81 mg by mouth Daily., Disp: , Rfl:   •  atorvastatin (LIPITOR) 40 MG tablet, Take 1 tablet by mouth Every Night for 180 days., Disp: 90 tablet, Rfl: 1  •  Cetirizine HCl 10 MG capsule, Take 10 mg by mouth every night at bedtime., Disp: 90 capsule, Rfl: 1  •  Cholecalciferol 50 MCG (2000 UT) tablet, Take 1 tablet by mouth Daily., Disp: 90 each, Rfl: 1  •  cyclobenzaprine (FLEXERIL) 10 MG tablet, Take 1 tablet by mouth 3 (Three) Times a Day As Needed for Muscle Spasms., Disp: 90 tablet, Rfl: 1  •  Diclofenac Sodium (VOLTAREN) 1 % gel gel, Apply 4 g topically to the appropriate area as directed 4 (Four) Times a Day As Needed (joint)., Disp: 350 g, Rfl: 1  •  fluorouracil (EFUDEX) 5 % cream, fluorouracil 5 % topical cream apply a sufficient amount to cover the lesions in the affected area(s) by topical route 2 times per day   Active, Disp: , Rfl:   •  fluticasone (FLONASE) 50 MCG/ACT nasal spray, 2 sprays  into the nostril(s) as directed by provider Daily. 2 sprays each nostril daily, Disp: 3 mL, Rfl: 1  •  gabapentin (NEURONTIN) 300 MG capsule, Take 1 capsule by mouth 3 (Three) Times a Day., Disp: 270 capsule, Rfl: 1  •  GARLIC PO, garlic 1,000 mg oral capsule take 1 capsule by oral route daily   Active, Disp: , Rfl:   •  isosorbide mononitrate (IMDUR) 60 MG 24 hr tablet, , Disp: , Rfl:   •  meclizine (ANTIVERT) 25 MG tablet, Take 1 tablet by mouth 3 (Three) Times a Day As Needed for Dizziness., Disp: 90 tablet, Rfl: 5  •  metoprolol succinate XL (TOPROL-XL) 50 MG 24 hr tablet, , Disp: , Rfl:   •  nitroglycerin (NITROSTAT) 0.3 MG SL tablet, nitroglycerin 0.3 mg sublingual tablet, sublingual place 1 tablet (0.3 mg) by sublingual route at the first sign of an attack; no more than 3 tabs are recommended within a 15 minute period.   Active, Disp: , Rfl:   •  nitroglycerin (NITROSTAT) 0.4 MG SL tablet, Place 0.4 mg under the tongue., Disp: , Rfl:   •  tamsulosin (FLOMAX) 0.4 MG capsule 24 hr capsule, Take 1 capsule by mouth Daily., Disp: 90 capsule, Rfl: 1    Allergies:   No Known Allergies          Objective     Physical Exam:  Vital Signs:   Vitals:    01/06/23 0728 01/06/23 0803   BP: 163/88 140/88   Pulse: 61    Temp: 96.9 °F (36.1 °C)    SpO2: 100%    Weight: 82.6 kg (182 lb)    Height: 180.3 cm (71\")      Body mass index is 25.38 kg/m².     Physical Exam  HENT:      Right Ear: Tympanic membrane normal.      Left Ear: Tympanic membrane normal.      Nose: Nose normal.      Mouth/Throat:      Mouth: Mucous membranes are moist.   Eyes:      Conjunctiva/sclera: Conjunctivae normal.   Neck:      Vascular: No carotid bruit.   Cardiovascular:      Rate and Rhythm: Normal rate and regular rhythm.      Heart sounds: Normal heart sounds. No murmur heard.  Pulmonary:      Effort: Pulmonary effort is normal.      Breath sounds: Normal breath sounds.   Abdominal:      General: Bowel sounds are normal.      Palpations: Abdomen is  soft.   Musculoskeletal:      Right lower leg: No edema.      Left lower leg: No edema.   Skin:     General: Skin is warm and dry.   Neurological:      Mental Status: He is alert.   Psychiatric:         Mood and Affect: Mood normal.         Behavior: Behavior normal.             Assessment / Plan      Assessment/Plan:   Diagnoses and all orders for this visit:    1. Mixed hyperlipidemia (Primary)  -     Comprehensive Metabolic Panel  -     Lipid Panel    2. Primary hypertension  -     Comprehensive Metabolic Panel    3. Impaired fasting glucose  -     Comprehensive Metabolic Panel  -     Microalbumin / Creatinine Urine Ratio - Urine, Clean Catch  -     Hemoglobin A1c  -     Urinalysis With Culture If Indicated - Urine, Clean Catch    4. Coronary artery disease involving native heart without angina pectoris, unspecified vessel or lesion type    5. Pulmonary emphysema, unspecified emphysema type (HCC)    6. Thrombocytopenia (HCC)  -     CBC Auto Differential  -     Manual Differential    7. DDD (degenerative disc disease), cervical  -     gabapentin (NEURONTIN) 300 MG capsule; Take 1 capsule by mouth 3 (Three) Times a Day.  Dispense: 270 capsule; Refill: 1    8. Cervical spondylosis without myelopathy    9. Cervico-occipital neuralgia  -     gabapentin (NEURONTIN) 300 MG capsule; Take 1 capsule by mouth 3 (Three) Times a Day.  Dispense: 270 capsule; Refill: 1    10. Medication management  -     POC Urine Drug Screen [facility]    11. Screening for thyroid disorder  -     TSH    Other orders  -     Cetirizine HCl 10 MG capsule; Take 10 mg by mouth every night at bedtime.  Dispense: 90 capsule; Refill: 1  -     Cholecalciferol 50 MCG (2000 UT) tablet; Take 1 tablet by mouth Daily.  Dispense: 90 each; Refill: 1  -     cyclobenzaprine (FLEXERIL) 10 MG tablet; Take 1 tablet by mouth 3 (Three) Times a Day As Needed for Muscle Spasms.  Dispense: 90 tablet; Refill: 1  -     Diclofenac Sodium (VOLTAREN) 1 % gel gel; Apply  4 g topically to the appropriate area as directed 4 (Four) Times a Day As Needed (joint).  Dispense: 350 g; Refill: 1  -     fluticasone (FLONASE) 50 MCG/ACT nasal spray; 2 sprays into the nostril(s) as directed by provider Daily. 2 sprays each nostril daily  Dispense: 3 mL; Refill: 1  -     tamsulosin (FLOMAX) 0.4 MG capsule 24 hr capsule; Take 1 capsule by mouth Daily.  Dispense: 90 capsule; Refill: 1  -     atorvastatin (LIPITOR) 40 MG tablet; Take 1 tablet by mouth Every Night for 180 days.  Dispense: 90 tablet; Refill: 1         HTN elevated upon arrival to clinic rechecked manaully remains elevated recommend blood pressure check in 1 week if still elevated would start losartan 25 mg every morning  Not currently smoking quit in 2016  Hyperlipidemia we will obtain lipid panel and CMP to monitor current statin dose Lipitor 40 mg at nighttime denies myalgias  Impaired fasting glucose obtain hemoglobin A1c to monitor recommend his carb intake increasing protein intake and exercise 30 minutes daily  Coronary artery disease patient currently on aspirin and statin per cardiology  Emphysema no wheezing or shortness of breath at this time albuterol inhaler as needed  Thrombocytopenia we will obtain CBC to monitor Plavix was discontinued for this reason  Cervical neuralgia degenerative disc disease cervical spine with pain we will provide a refill of gabapentin Rufino reviewed urine drug screen obtained in office    Follow Up:   Return in about 6 months (around 7/6/2023).    GLORIA Emmanuel    \"Please note that portions of this note were completed with a voice recognition program.\"

## 2023-02-06 ENCOUNTER — OFFICE VISIT (OUTPATIENT)
Dept: FAMILY MEDICINE CLINIC | Facility: CLINIC | Age: 73
End: 2023-02-06
Payer: MEDICARE

## 2023-02-06 VITALS
BODY MASS INDEX: 26.12 KG/M2 | TEMPERATURE: 97.3 F | SYSTOLIC BLOOD PRESSURE: 133 MMHG | HEART RATE: 65 BPM | OXYGEN SATURATION: 99 % | WEIGHT: 186.6 LBS | HEIGHT: 71 IN | DIASTOLIC BLOOD PRESSURE: 84 MMHG

## 2023-02-06 DIAGNOSIS — Z00.00 ENCOUNTER FOR ANNUAL WELLNESS EXAM IN MEDICARE PATIENT: Primary | ICD-10-CM

## 2023-02-06 DIAGNOSIS — J43.9 PULMONARY EMPHYSEMA, UNSPECIFIED EMPHYSEMA TYPE: ICD-10-CM

## 2023-02-06 DIAGNOSIS — R06.02 SHORTNESS OF BREATH: ICD-10-CM

## 2023-02-06 PROCEDURE — 1170F FXNL STATUS ASSESSED: CPT | Performed by: NURSE PRACTITIONER

## 2023-02-06 PROCEDURE — 1160F RVW MEDS BY RX/DR IN RCRD: CPT | Performed by: NURSE PRACTITIONER

## 2023-02-06 PROCEDURE — G0439 PPPS, SUBSEQ VISIT: HCPCS | Performed by: NURSE PRACTITIONER

## 2023-02-06 PROCEDURE — 1159F MED LIST DOCD IN RCRD: CPT | Performed by: NURSE PRACTITIONER

## 2023-02-06 PROCEDURE — 99213 OFFICE O/P EST LOW 20 MIN: CPT | Performed by: NURSE PRACTITIONER

## 2023-02-06 RX ORDER — TIOTROPIUM BROMIDE INHALATION SPRAY 1.56 UG/1
2 SPRAY, METERED RESPIRATORY (INHALATION)
Qty: 3 EACH | Refills: 1 | Status: SHIPPED | OUTPATIENT
Start: 2023-02-06

## 2023-02-06 NOTE — PROGRESS NOTES
"The ABCs of the Annual Wellness Visit  Subsequent Medicare Wellness Visit  Has a hx of Hypertension, Hyperlipidemia, Insomnia, and impaired fastin glucose    Patient refused to get shingles vaccine  He stated he has ahd 3 COVID vaccines and deosn't think he will get another    C/o shortness of breath for about 5-6 months progressively worse  History of  Emphysema, using albuterol inhaler on occasion, states unable to long distances or stairs without shortness of breath       PSA 01/2022  Cologuard will be sent 02/21/2023 to home, the order was too early  CT Chest   Patient had smoked for 50 years, last test was done 09/2022, quit smoking 2016, removed upper lobe right 4.6.2016  Last labs 01/2023      Kamron Sanchez is a 72 y.o. male who presents for a Subsequent Medicare Wellness Visit.    The following portions of the patient's history were reviewed and   updated as appropriate: allergies, current medications, past family history, past medical history, past social history, past surgical history and problem list.    Compared to one year ago, the patient feels his physical   health is worse. Slowing down the age \"running out of gas quicker\" states he does not have the strength when he was 19 years old    Compared to one year ago, the patient feels his mental   health is the same.    Recent Hospitalizations:  He was not admitted to the hospital during the last year.       Current Medical Providers:  Patient Care Team:  Garrett Harrison APRN as PCP - General (Family Medicine)  Jie Churchill APRN as Nurse Practitioner (Nurse Practitioner)    Outpatient Medications Prior to Visit   Medication Sig Dispense Refill   • albuterol sulfate  (90 Base) MCG/ACT inhaler ProAir HFA 90 mcg/actuation aerosol inhaler     • ascorbic acid (VITAMIN C) 1000 MG tablet Vitamin C 1,000 mg oral tablet take 1 tablet by oral route daily   Active     • aspirin 81 MG EC tablet Take 81 mg by mouth Daily.     • atorvastatin " (LIPITOR) 40 MG tablet Take 1 tablet by mouth Every Night for 180 days. 90 tablet 1   • Cholecalciferol 50 MCG (2000 UT) tablet Take 1 tablet by mouth Daily. 90 each 1   • cyclobenzaprine (FLEXERIL) 10 MG tablet Take 1 tablet by mouth 3 (Three) Times a Day As Needed for Muscle Spasms. 90 tablet 1   • fluorouracil (EFUDEX) 5 % cream fluorouracil 5 % topical cream apply a sufficient amount to cover the lesions in the affected area(s) by topical route 2 times per day   Active     • fluticasone (FLONASE) 50 MCG/ACT nasal spray 2 sprays into the nostril(s) as directed by provider Daily. 2 sprays each nostril daily 3 mL 1   • gabapentin (NEURONTIN) 300 MG capsule Take 1 capsule by mouth 3 (Three) Times a Day. 270 capsule 1   • GARLIC PO garlic 1,000 mg oral capsule take 1 capsule by oral route daily   Active     • isosorbide mononitrate (IMDUR) 60 MG 24 hr tablet      • meclizine (ANTIVERT) 25 MG tablet Take 1 tablet by mouth 3 (Three) Times a Day As Needed for Dizziness. 90 tablet 5   • metoprolol succinate XL (TOPROL-XL) 50 MG 24 hr tablet      • tamsulosin (FLOMAX) 0.4 MG capsule 24 hr capsule Take 1 capsule by mouth Daily. 90 capsule 1   • Cetirizine HCl 10 MG capsule Take 10 mg by mouth every night at bedtime. 90 capsule 1   • Diclofenac Sodium (VOLTAREN) 1 % gel gel Apply 4 g topically to the appropriate area as directed 4 (Four) Times a Day As Needed (joint). 350 g 1   • nitroglycerin (NITROSTAT) 0.3 MG SL tablet nitroglycerin 0.3 mg sublingual tablet, sublingual place 1 tablet (0.3 mg) by sublingual route at the first sign of an attack; no more than 3 tabs are recommended within a 15 minute period.   Active     • nitroglycerin (NITROSTAT) 0.4 MG SL tablet Place 0.4 mg under the tongue.       No facility-administered medications prior to visit.       No opioid medication identified on active medication list. I have reviewed chart for other potential  high risk medication/s and harmful drug interactions in the  "elderly.          Aspirin is on active medication list. Aspirin use is indicated based on review of current medical condition/s. Pros and cons of this therapy have been discussed today. Benefits of this medication outweigh potential harm.  Patient has been encouraged to continue taking this medication.  .  Stents placed 2007  x2 cardiologist     Mike Hernandes  Cardiology  [facility]           Patient Active Problem List   Diagnosis   • Cancer (HCC)   • Cervical spondylosis without myelopathy   • Cervicogenic headache   • Cervico-occipital neuralgia   • DDD (degenerative disc disease), cervical   • Emphysema of lung (HCC)   • Hepatitis   • Hepatitis C   • Hyperlipidemia   • Hypertension   • Impaired fasting glucose   • Lung cancer (HCC)   • Lung disease   • Lung mass   • Heart attack (HCC)   • Shortness of breath   • Tobacco use disorder   • Seasonal allergies   • CAD (coronary artery disease)   • Medication management   • Primary insomnia   • Benign prostatic hyperplasia with weak urinary stream   • Trigger index finger of left hand   • Vertigo   • Chest pain   • Dyslipidemia   • Ischemic cardiomyopathy   • Presence of coronary angioplasty implant and graft   • Thrombocytopenia (HCC)     Advance Care Planning  Advance Directive is on file.  Objective    Vitals:    02/06/23 0758   BP: 133/84   Pulse: 65   Temp: 97.3 °F (36.3 °C)   SpO2: 99%   Weight: 84.6 kg (186 lb 9.6 oz)   Height: 180.3 cm (71\")     Estimated body mass index is 26.03 kg/m² as calculated from the following:    Height as of this encounter: 180.3 cm (71\").    Weight as of this encounter: 84.6 kg (186 lb 9.6 oz).    BMI is >= 25 and <30. (Overweight) The following options were offered after discussion;: referral to a nutritionist      Does the patient have evidence of cognitive impairment?   No    Lab Results   Component Value Date    TRIG 70 01/06/2023    HDL 46 01/06/2023    LDL 72 01/06/2023    VLDL 14 01/06/2023    HGBA1C 6.10 (H) " 2023       Review of Systems:   Review of Systems   Constitutional: Negative for fever.   HENT: Negative for ear pain and sore throat.    Respiratory: Positive for shortness of breath. Negative for cough.    Cardiovascular: Negative for chest pain.   Gastrointestinal: Negative for diarrhea, nausea and vomiting.   Genitourinary: Negative for dysuria.   Musculoskeletal: Positive for neck pain. Negative for myalgias.   Neurological: Negative for headaches.     Physical Exam    Eyes:      Conjunctiva/sclera: Conjunctivae normal.   Neck:      Vascular: No carotid bruit.   Cardiovascular:      Rate and Rhythm: Normal rate and regular rhythm.      Heart sounds: Normal heart sounds. No murmur heard.  Pulmonary:      Effort: Pulmonary effort is normal.      Breath sounds: Normal breath sounds.   Abdominal:      General: Bowel sounds are normal.      Palpations: Abdomen is soft.   Musculoskeletal:      Right lower leg: No edema.      Left lower leg: No edema.   Skin:     General: Skin is warm and dry.   Neurological:      Mental Status: He is alert.   Psychiatric:         Mood and Affect: Mood normal.         Behavior: Behavior normal.     HEALTH RISK ASSESSMENT    Smoking Status:  Social History     Tobacco Use   Smoking Status Former   • Packs/day: 2.00   • Years: 31.00   • Pack years: 62.00   • Types: Cigarettes   • Start date:    • Quit date:    • Years since quittin.1   Smokeless Tobacco Never     Alcohol Consumption:  Social History     Substance and Sexual Activity   Alcohol Use Never     Fall Risk Screen:    [facility], and patient is at MODERATE risk for falls. Assessment completed on:2023    Depression Screening:  PHQ-2/PHQ-9 Depression Screening 2023   Little Interest or Pleasure in Doing Things 0-->not at all   Feeling Down, Depressed or Hopeless 0-->not at all   PHQ-9: Brief Depression Severity Measure Score 0       Health Habits and Functional and  Cognitive Screening:  Functional & Cognitive Status 2/6/2023   Do you have difficulty preparing food and eating? No   Do you have difficulty bathing yourself, getting dressed or grooming yourself? No   Do you have difficulty using the toilet? No   Do you have difficulty moving around from place to place? No   Do you have trouble with steps or getting out of a bed or a chair? Yes   Current Diet Limited Junk Food   Dental Exam Not up to date        Dental Exam Comment patient has dentures about 35 years   Eye Exam Up to date        Eye Exam Comment [facility]  Dr Ojeda   Exercise (times per week) 7 times per week   Current Exercises Include No Regular Exercise;Walking;Yard Work;Gardening;Light Weights   Do you need help using the phone?  No   Are you deaf or do you have serious difficulty hearing?  Yes   Do you need help with transportation? No   Do you need help shopping? No   Do you need help preparing meals?  No   Do you need help with housework?  No   Do you need help with laundry? No   Do you need help taking your medications? No   Do you need help managing money? No   Do you ever drive or ride in a car without wearing a seat belt? No       Age-appropriate Screening Schedule:  Refer to the list below for future screening recommendations based on patient's age, sex and/or medical conditions. Orders for these recommended tests are listed in the plan section. The patient has been provided with a written plan.    Health Maintenance   Topic Date Due   • ZOSTER VACCINE (1 of 2) 02/06/2023 (Originally 8/2/1999)   • INFLUENZA VACCINE  03/31/2023 (Originally 8/1/2022)   • LIPID PANEL  01/06/2024   • TDAP/TD VACCINES (2 - Td or Tdap) 07/01/2028                CMS Preventative Services Quick Reference  Risk Factors Identified During Encounter:    Immunizations Discussed/Encouraged: COVID19    The above risks/problems have been discussed with the patient.  Pertinent information has been shared with the patient in the After  Visit Summary.    Diagnoses and all orders for this visit:    1. Encounter for annual wellness exam in Medicare patient (Primary)    2. Pulmonary emphysema, unspecified emphysema type (HCC)    3. Shortness of breath    Other orders  -     Tiotropium Bromide Monohydrate (Spiriva Respimat) 1.25 MCG/ACT aerosol solution inhaler; Inhale 2 puffs Daily.  Dispense: 3 each; Refill: 1    will start spiriva to help with shortness of breath if symptoms persist would change to trelegy     Follow Up:   Next Medicare Wellness visit to be scheduled in 1 year.      An After Visit Summary and PPPS were made available to the patient.

## 2023-03-01 DIAGNOSIS — R19.5 POSITIVE COLORECTAL CANCER SCREENING USING COLOGUARD TEST: Primary | ICD-10-CM

## 2023-03-28 ENCOUNTER — OFFICE VISIT (OUTPATIENT)
Dept: SURGERY | Facility: CLINIC | Age: 73
End: 2023-03-28
Payer: MEDICARE

## 2023-03-28 ENCOUNTER — PREP FOR SURGERY (OUTPATIENT)
Dept: OTHER | Facility: HOSPITAL | Age: 73
End: 2023-03-28
Payer: MEDICARE

## 2023-03-28 VITALS — WEIGHT: 187 LBS | HEART RATE: 66 BPM | HEIGHT: 71 IN | BODY MASS INDEX: 26.18 KG/M2

## 2023-03-28 DIAGNOSIS — R19.5 ABNORMAL STOOL TEST: Primary | ICD-10-CM

## 2023-03-28 PROCEDURE — 1160F RVW MEDS BY RX/DR IN RCRD: CPT | Performed by: NURSE PRACTITIONER

## 2023-03-28 PROCEDURE — 1159F MED LIST DOCD IN RCRD: CPT | Performed by: NURSE PRACTITIONER

## 2023-03-28 PROCEDURE — S0260 H&P FOR SURGERY: HCPCS | Performed by: NURSE PRACTITIONER

## 2023-03-28 RX ORDER — CETIRIZINE HYDROCHLORIDE 10 MG/1
TABLET ORAL
COMMUNITY
Start: 2023-01-06

## 2023-03-28 RX ORDER — SIMETHICONE 80 MG
TABLET,CHEWABLE ORAL
Qty: 4 TABLET | Refills: 0 | Status: SHIPPED | OUTPATIENT
Start: 2023-03-28

## 2023-03-28 RX ORDER — PEG-3350, SODIUM SULFATE, SODIUM CHLORIDE, POTASSIUM CHLORIDE, SODIUM ASCORBATE AND ASCORBIC ACID 7.5-2.691G
1000 KIT ORAL ONCE
Qty: 1000 ML | Refills: 0 | Status: SHIPPED | OUTPATIENT
Start: 2023-03-28 | End: 2023-03-28

## 2023-03-28 RX ORDER — MELATONIN
COMMUNITY
Start: 2023-01-06

## 2023-03-28 NOTE — H&P (VIEW-ONLY)
Chief Complaint: Colonoscopy (consult)    Subjective      Colonoscopy consultation       History of Present Illness  Kamron Sanchez is a 72 y.o. male presents to [facility] for colonoscopy consultation.    Patient presents today on referral from chante Harrison for positive Cologuard.  Patient denies any abdominal pain, change in bowel habit, or rectal bleeding.    Denies any family history of colorectal cancer.    Component  Ref Range & Units 1 mo ago   Cologuard  Negative Positive Abnormal         Patient has a personal history of lung cancer and had a right upper lobectomy, patient refused chemo and radiation.  Patient reports his mother had breast cancer.    3/16: Colonoscopy (Patria): normal colon.    Patient reports that he had an MI with a stent placement in 2016.  We will get cardiac clearance from Dr. Hernandes.    Objective     Past Medical History:   Diagnosis Date   • Abnormal blood chemistry 09/17/2014    Elevated RBC count   • Arthritis 09/16/2014   • Arthropathy, unspecified     multiple sites   • Cancer (HCC)    • Cervicogenic headache 08/09/2019   • Emphysema of lung (HCC) 02/09/2016   • Heart attack (HCC)    • Hepatitis C    • Hyperlipemia    • Hyperlipidemia 09/16/2014   • Hypertension 09/16/2014   • Impaired fasting glucose 09/17/2014   • Lung cancer (HCC)    • Lung disease    • Lung nodule seen on imaging study 02/09/2016   • Myocardial infarction (HCC)    • Shortness of breath    • Tobacco abuse 02/09/2016       Past Surgical History:   Procedure Laterality Date   • CARDIAC SURGERY     • CARDIAC VALVE SURGERY     • COLONOSCOPY     • CORONARY STENT PLACEMENT     • FINGER SURGERY     • HAND SURGERY     • HERNIA REPAIR     • SKIN CANCER EXCISION     • TONSILLECTOMY         Outpatient Medications Marked as Taking for the 3/28/23 encounter (Office Visit) with Shari Bauer APRN   Medication Sig Dispense Refill   • ascorbic acid (VITAMIN C) 1000 MG tablet Vitamin  C 1,000 mg oral tablet take 1 tablet by oral route daily   Active     • aspirin 81 MG EC tablet Take 1 tablet by mouth Daily.     • atorvastatin (LIPITOR) 40 MG tablet Take 1 tablet by mouth Every Night for 180 days. 90 tablet 1   • Cholecalciferol 50 MCG (2000 UT) tablet Take 1 tablet by mouth Daily. 90 each 1   • fluorouracil (EFUDEX) 5 % cream fluorouracil 5 % topical cream apply a sufficient amount to cover the lesions in the affected area(s) by topical route 2 times per day   Active     • gabapentin (NEURONTIN) 300 MG capsule Take 1 capsule by mouth 3 (Three) Times a Day. 270 capsule 1   • GARLIC PO garlic 1,000 mg oral capsule take 1 capsule by oral route daily   Active     • isosorbide mononitrate (IMDUR) 60 MG 24 hr tablet      • metoprolol succinate XL (TOPROL-XL) 50 MG 24 hr tablet      • nitroglycerin (NITROSTAT) 0.4 MG SL tablet Place 1 tablet under the tongue.     • tamsulosin (FLOMAX) 0.4 MG capsule 24 hr capsule Take 1 capsule by mouth Daily. 90 capsule 1   • Tiotropium Bromide Monohydrate (Spiriva Respimat) 1.25 MCG/ACT aerosol solution inhaler Inhale 2 puffs Daily. 3 each 1       No Known Allergies     Family History   Problem Relation Age of Onset   • Cancer Mother    • Diabetes Father    • Arthritis Father    • Cancer Father    • Stroke Other    • Breast cancer Other        Social History     Socioeconomic History   • Marital status:    Tobacco Use   • Smoking status: Former     Packs/day: 2.00     Years: 31.00     Pack years: 62.00     Types: Cigarettes     Start date:      Quit date:      Years since quittin.2   • Smokeless tobacco: Never   Vaping Use   • Vaping Use: Never used   Substance and Sexual Activity   • Alcohol use: Never   • Drug use: Never   • Sexual activity: Defer       Review of Systems   Constitutional: Negative for chills and fever.   Gastrointestinal: Negative for abdominal distention, abdominal pain, anal bleeding, blood in stool, constipation, diarrhea and  "rectal pain.        Vital Signs:   Pulse 66   Ht 180.3 cm (71\")   Wt 84.8 kg (187 lb)   BMI 26.08 kg/m²      Physical Exam  Vitals and nursing note reviewed.   Constitutional:       General: He is not in acute distress.     Appearance: Normal appearance.   HENT:      Head: Normocephalic.   Cardiovascular:      Rate and Rhythm: Normal rate.   Pulmonary:      Effort: Pulmonary effort is normal.      Breath sounds: No stridor.   Abdominal:      Palpations: Abdomen is soft.      Tenderness: There is no guarding.   Musculoskeletal:         General: No deformity. Normal range of motion.   Skin:     General: Skin is warm and dry.      Coloration: Skin is not jaundiced.   Neurological:      General: No focal deficit present.      Mental Status: He is alert and oriented to person, place, and time.   Psychiatric:         Mood and Affect: Mood normal.         Thought Content: Thought content normal.          Result Review :          []  Laboratory  []  Radiology  []  Pathology  []  Microbiology  []  EKG/Telemetry   []  Cardiology/Vascular   []  Old records  I spent 20 minutes caring for Kamron on this date of service. This time includes time spent by me in the following activities: reviewing tests, obtaining and/or reviewing a separately obtained history, performing a medically appropriate examination and/or evaluation, ordering medications, tests, or procedures, and documenting information in the medical record.     Assessment and Plan    Diagnoses and all orders for this visit:    1. Abnormal stool test (Primary)    Other orders  -     PEG-KCl-NaCl-NaSulf-Na Asc-C (MOVIPREP) 100 g reconstituted solution powder; Take 1,000 mL by mouth 1 (One) Time for 1 dose.  Dispense: 1000 mL; Refill: 0  -     simethicone (Gas-X) 80 MG chewable tablet; Take 2 tablets PO after completing movi prep and 2 tablets PO 4 hours prior to procedure.  Dispense: 4 tablet; Refill: 0        Follow Up   Return for Scheduled colonoscopy with Dr." Patria on 4/17/2023 at [facility].     Phone PAT.  Hospital call with arrival time    Possible risks/complications, benefits, and alternatives to surgical or invasive procedure have been explained to patient and/or legal guardian.     Patient has been evaluated and can tolerate anesthesia and/or sedation. Risks, benefits, and alternatives to anesthesia and sedation have been explained to patient and/or legal guardian.  Patient verbalizes understanding and is willing to proceed with above plan.    Patient was given instructions and counseling regarding his condition or for health maintenance advice. Please see specific information pulled into the AVS if appropriate.

## 2023-03-28 NOTE — PROGRESS NOTES
Chief Complaint: Colonoscopy (consult)    Subjective      Colonoscopy consultation       History of Present Illness  Kamron Sanchez is a 72 y.o. male presents to [facility] for colonoscopy consultation.    Patient presents today on referral from chante Harrison for positive Cologuard.  Patient denies any abdominal pain, change in bowel habit, or rectal bleeding.    Denies any family history of colorectal cancer.    Component  Ref Range & Units 1 mo ago   Cologuard  Negative Positive Abnormal         Patient has a personal history of lung cancer and had a right upper lobectomy, patient refused chemo and radiation.  Patient reports his mother had breast cancer.    3/16: Colonoscopy (Patria): normal colon.    Patient reports that he had an MI with a stent placement in 2016.  We will get cardiac clearance from Dr. Hernandes.    Objective     Past Medical History:   Diagnosis Date   • Abnormal blood chemistry 09/17/2014    Elevated RBC count   • Arthritis 09/16/2014   • Arthropathy, unspecified     multiple sites   • Cancer (HCC)    • Cervicogenic headache 08/09/2019   • Emphysema of lung (HCC) 02/09/2016   • Heart attack (HCC)    • Hepatitis C    • Hyperlipemia    • Hyperlipidemia 09/16/2014   • Hypertension 09/16/2014   • Impaired fasting glucose 09/17/2014   • Lung cancer (HCC)    • Lung disease    • Lung nodule seen on imaging study 02/09/2016   • Myocardial infarction (HCC)    • Shortness of breath    • Tobacco abuse 02/09/2016       Past Surgical History:   Procedure Laterality Date   • CARDIAC SURGERY     • CARDIAC VALVE SURGERY     • COLONOSCOPY     • CORONARY STENT PLACEMENT     • FINGER SURGERY     • HAND SURGERY     • HERNIA REPAIR     • SKIN CANCER EXCISION     • TONSILLECTOMY         Outpatient Medications Marked as Taking for the 3/28/23 encounter (Office Visit) with Shari Bauer APRN   Medication Sig Dispense Refill   • ascorbic acid (VITAMIN C) 1000 MG tablet Vitamin  C 1,000 mg oral tablet take 1 tablet by oral route daily   Active     • aspirin 81 MG EC tablet Take 1 tablet by mouth Daily.     • atorvastatin (LIPITOR) 40 MG tablet Take 1 tablet by mouth Every Night for 180 days. 90 tablet 1   • Cholecalciferol 50 MCG (2000 UT) tablet Take 1 tablet by mouth Daily. 90 each 1   • fluorouracil (EFUDEX) 5 % cream fluorouracil 5 % topical cream apply a sufficient amount to cover the lesions in the affected area(s) by topical route 2 times per day   Active     • gabapentin (NEURONTIN) 300 MG capsule Take 1 capsule by mouth 3 (Three) Times a Day. 270 capsule 1   • GARLIC PO garlic 1,000 mg oral capsule take 1 capsule by oral route daily   Active     • isosorbide mononitrate (IMDUR) 60 MG 24 hr tablet      • metoprolol succinate XL (TOPROL-XL) 50 MG 24 hr tablet      • nitroglycerin (NITROSTAT) 0.4 MG SL tablet Place 1 tablet under the tongue.     • tamsulosin (FLOMAX) 0.4 MG capsule 24 hr capsule Take 1 capsule by mouth Daily. 90 capsule 1   • Tiotropium Bromide Monohydrate (Spiriva Respimat) 1.25 MCG/ACT aerosol solution inhaler Inhale 2 puffs Daily. 3 each 1       No Known Allergies     Family History   Problem Relation Age of Onset   • Cancer Mother    • Diabetes Father    • Arthritis Father    • Cancer Father    • Stroke Other    • Breast cancer Other        Social History     Socioeconomic History   • Marital status:    Tobacco Use   • Smoking status: Former     Packs/day: 2.00     Years: 31.00     Pack years: 62.00     Types: Cigarettes     Start date:      Quit date:      Years since quittin.2   • Smokeless tobacco: Never   Vaping Use   • Vaping Use: Never used   Substance and Sexual Activity   • Alcohol use: Never   • Drug use: Never   • Sexual activity: Defer       Review of Systems   Constitutional: Negative for chills and fever.   Gastrointestinal: Negative for abdominal distention, abdominal pain, anal bleeding, blood in stool, constipation, diarrhea and  "rectal pain.        Vital Signs:   Pulse 66   Ht 180.3 cm (71\")   Wt 84.8 kg (187 lb)   BMI 26.08 kg/m²      Physical Exam  Vitals and nursing note reviewed.   Constitutional:       General: He is not in acute distress.     Appearance: Normal appearance.   HENT:      Head: Normocephalic.   Cardiovascular:      Rate and Rhythm: Normal rate.   Pulmonary:      Effort: Pulmonary effort is normal.      Breath sounds: No stridor.   Abdominal:      Palpations: Abdomen is soft.      Tenderness: There is no guarding.   Musculoskeletal:         General: No deformity. Normal range of motion.   Skin:     General: Skin is warm and dry.      Coloration: Skin is not jaundiced.   Neurological:      General: No focal deficit present.      Mental Status: He is alert and oriented to person, place, and time.   Psychiatric:         Mood and Affect: Mood normal.         Thought Content: Thought content normal.          Result Review :          []  Laboratory  []  Radiology  []  Pathology  []  Microbiology  []  EKG/Telemetry   []  Cardiology/Vascular   []  Old records  I spent 20 minutes caring for Kamron on this date of service. This time includes time spent by me in the following activities: reviewing tests, obtaining and/or reviewing a separately obtained history, performing a medically appropriate examination and/or evaluation, ordering medications, tests, or procedures, and documenting information in the medical record.     Assessment and Plan    Diagnoses and all orders for this visit:    1. Abnormal stool test (Primary)    Other orders  -     PEG-KCl-NaCl-NaSulf-Na Asc-C (MOVIPREP) 100 g reconstituted solution powder; Take 1,000 mL by mouth 1 (One) Time for 1 dose.  Dispense: 1000 mL; Refill: 0  -     simethicone (Gas-X) 80 MG chewable tablet; Take 2 tablets PO after completing movi prep and 2 tablets PO 4 hours prior to procedure.  Dispense: 4 tablet; Refill: 0        Follow Up   Return for Scheduled colonoscopy with Dr." Patria on 4/17/2023 at [facility].     Phone PAT.  Hospital call with arrival time    Possible risks/complications, benefits, and alternatives to surgical or invasive procedure have been explained to patient and/or legal guardian.     Patient has been evaluated and can tolerate anesthesia and/or sedation. Risks, benefits, and alternatives to anesthesia and sedation have been explained to patient and/or legal guardian.  Patient verbalizes understanding and is willing to proceed with above plan.    Patient was given instructions and counseling regarding his condition or for health maintenance advice. Please see specific information pulled into the AVS if appropriate.

## 2023-04-17 ENCOUNTER — ANESTHESIA (OUTPATIENT)
Dept: GASTROENTEROLOGY | Facility: HOSPITAL | Age: 73
End: 2023-04-17
Payer: MEDICARE

## 2023-04-17 ENCOUNTER — ANESTHESIA EVENT (OUTPATIENT)
Dept: GASTROENTEROLOGY | Facility: HOSPITAL | Age: 73
End: 2023-04-17
Payer: MEDICARE

## 2023-04-17 ENCOUNTER — HOSPITAL ENCOUNTER (OUTPATIENT)
Facility: HOSPITAL | Age: 73
Setting detail: HOSPITAL OUTPATIENT SURGERY
Discharge: HOME | End: 2023-04-17
Attending: SURGERY | Admitting: SURGERY
Payer: MEDICARE

## 2023-04-17 VITALS
RESPIRATION RATE: 17 BRPM | DIASTOLIC BLOOD PRESSURE: 59 MMHG | HEART RATE: 68 BPM | BODY MASS INDEX: 26.39 KG/M2 | SYSTOLIC BLOOD PRESSURE: 122 MMHG | TEMPERATURE: 97 F | OXYGEN SATURATION: 98 % | WEIGHT: 184.3 LBS | HEIGHT: 70 IN

## 2023-04-17 DIAGNOSIS — R19.5 ABNORMAL STOOL TEST: ICD-10-CM

## 2023-04-17 PROCEDURE — 25010000002 PROPOFOL 10 MG/ML EMULSION: Performed by: NURSE ANESTHETIST, CERTIFIED REGISTERED

## 2023-04-17 PROCEDURE — 88305 TISSUE EXAM BY PATHOLOGIST: CPT | Performed by: SURGERY

## 2023-04-17 DEVICE — DEV CLIP ENDO RESOLUTION360 CONTRL ROT 235CM: Type: IMPLANTABLE DEVICE | Site: SIGMOID COLON | Status: FUNCTIONAL

## 2023-04-17 RX ORDER — PROPOFOL 10 MG/ML
VIAL (ML) INTRAVENOUS AS NEEDED
Status: DISCONTINUED | OUTPATIENT
Start: 2023-04-17 | End: 2023-04-17 | Stop reason: SURG

## 2023-04-17 RX ORDER — SODIUM CHLORIDE, SODIUM LACTATE, POTASSIUM CHLORIDE, CALCIUM CHLORIDE 600; 310; 30; 20 MG/100ML; MG/100ML; MG/100ML; MG/100ML
30 INJECTION, SOLUTION INTRAVENOUS CONTINUOUS
Status: DISCONTINUED | OUTPATIENT
Start: 2023-04-17 | End: 2023-04-17 | Stop reason: HOSPADM

## 2023-04-17 RX ORDER — PHENYLEPHRINE HCL IN 0.9% NACL 1 MG/10 ML
SYRINGE (ML) INTRAVENOUS AS NEEDED
Status: DISCONTINUED | OUTPATIENT
Start: 2023-04-17 | End: 2023-04-17 | Stop reason: SURG

## 2023-04-17 RX ORDER — LIDOCAINE HYDROCHLORIDE 20 MG/ML
INJECTION, SOLUTION EPIDURAL; INFILTRATION; INTRACAUDAL; PERINEURAL AS NEEDED
Status: DISCONTINUED | OUTPATIENT
Start: 2023-04-17 | End: 2023-04-17 | Stop reason: SURG

## 2023-04-17 RX ORDER — ONDANSETRON 2 MG/ML
4 INJECTION INTRAMUSCULAR; INTRAVENOUS ONCE AS NEEDED
Status: DISCONTINUED | OUTPATIENT
Start: 2023-04-17 | End: 2023-04-17 | Stop reason: HOSPADM

## 2023-04-17 RX ORDER — ONDANSETRON 4 MG/1
4 TABLET, FILM COATED ORAL ONCE AS NEEDED
Status: DISCONTINUED | OUTPATIENT
Start: 2023-04-17 | End: 2023-04-17 | Stop reason: HOSPADM

## 2023-04-17 RX ADMIN — PROPOFOL 175 MCG/KG/MIN: 10 INJECTION, EMULSION INTRAVENOUS at 12:11

## 2023-04-17 RX ADMIN — PROPOFOL 100 MG: 10 INJECTION, EMULSION INTRAVENOUS at 12:11

## 2023-04-17 RX ADMIN — LIDOCAINE HYDROCHLORIDE 50 MG: 20 INJECTION, SOLUTION EPIDURAL; INFILTRATION; INTRACAUDAL; PERINEURAL at 12:11

## 2023-04-17 RX ADMIN — Medication 200 MCG: at 12:47

## 2023-04-17 RX ADMIN — Medication 200 MCG: at 12:45

## 2023-04-17 RX ADMIN — SODIUM CHLORIDE, POTASSIUM CHLORIDE, SODIUM LACTATE AND CALCIUM CHLORIDE 30 ML/HR: 600; 310; 30; 20 INJECTION, SOLUTION INTRAVENOUS at 11:09

## 2023-04-17 NOTE — ANESTHESIA PREPROCEDURE EVALUATION
Anesthesia Evaluation     Patient summary reviewed and Nursing notes reviewed   no history of anesthetic complications:  NPO Solid Status: > 8 hours  NPO Liquid Status: > 2 hours           Airway   Mallampati: II  TM distance: >3 FB  Neck ROM: full  No difficulty expected  Dental    (+) upper dentures and lower dentures    Pulmonary - normal exam    breath sounds clear to auscultation  (+) lung cancer, COPD, shortness of breath,   Cardiovascular - normal exam  Exercise tolerance: good (4-7 METS)    Rhythm: regular  Rate: normal    (+) hypertension, past MI  >12 months, CAD, hyperlipidemia,       Neuro/Psych  (+) headaches, dizziness/light headedness,    GI/Hepatic/Renal/Endo    (+)   hepatitis, liver disease,     Musculoskeletal     (+) neck pain,   Abdominal    Substance History - negative use     OB/GYN negative ob/gyn ROS         Other   arthritis,      ROS/Med Hx Other: PAT Nursing Notes unavailable.                   Anesthesia Plan    ASA 3     general     (Total IV Anesthesia    Patient understands anesthesia not responsible for dental damage.  )  intravenous induction     Anesthetic plan, risks, benefits, and alternatives have been provided, discussed and informed consent has been obtained with: patient.  Pre-procedure education provided  Plan discussed with CRNA.        CODE STATUS:

## 2023-04-17 NOTE — ANESTHESIA POSTPROCEDURE EVALUATION
Patient: Kamron Sanchez    Procedure Summary     Date: 04/17/23 Room / Location: [facility] ENDOSCOPY 3 / [facility] ENDOSCOPY    Anesthesia Start: 1210 Anesthesia Stop: 1253    Procedure: COLONOSCOPY WITH POLYPECTOMIES, HOT SNARE, CLIP APPLICATION X2 Diagnosis:       Abnormal stool test      (Abnormal stool test [R19.5])    Surgeons: aTmir España MD Provider: Chaz Ba MD    Anesthesia Type: general ASA Status: 3          Anesthesia Type: general    Vitals  Vitals Value Taken Time   /68 04/17/23 1259   Temp 36.2 °C (97.2 °F) 04/17/23 1249   Pulse 67 04/17/23 1259   Resp 18 04/17/23 1259   SpO2 97 % 04/17/23 1259           Post Anesthesia Care and Evaluation    Patient location during evaluation: bedside  Patient participation: complete - patient participated  Level of consciousness: awake  Pain management: adequate    Airway patency: patent  Anesthetic complications: No anesthetic complications    Cardiovascular status: acceptable  Respiratory status: acceptable  Hydration status: acceptable    Comments: An Anesthesiologist personally participated in the most demanding procedures (including induction and emergence if applicable) in the anesthesia plan, monitored the course of anesthesia administration at frequent intervals and remained physically present and available for immediate diagnosis and treatment of emergencies.

## 2023-04-24 ENCOUNTER — TELEPHONE (OUTPATIENT)
Dept: SURGERY | Facility: CLINIC | Age: 73
End: 2023-04-24
Payer: MEDICARE

## 2023-05-08 RX ORDER — POLYETHYLENE GLYCOL 3350, SODIUM SULFATE, SODIUM CHLORIDE, POTASSIUM CHLORIDE, ASCORBIC ACID, SODIUM ASCORBATE 7.5-2.691G
KIT ORAL
COMMUNITY
Start: 2023-03-28

## 2023-05-09 ENCOUNTER — OFFICE VISIT (OUTPATIENT)
Dept: SURGERY | Facility: CLINIC | Age: 73
End: 2023-05-09
Payer: MEDICARE

## 2023-05-09 VITALS — HEIGHT: 70 IN | WEIGHT: 184 LBS | BODY MASS INDEX: 26.34 KG/M2 | RESPIRATION RATE: 14 BRPM

## 2023-05-09 DIAGNOSIS — R19.5 ABNORMAL STOOL TEST: Primary | ICD-10-CM

## 2023-05-09 NOTE — PROGRESS NOTES
Chief Complaint  Colonoscopy and Post-op    Subjective          Kamron Sanchez presents to [facility]  History of Present Illness    Kamron Sanchez is a 72 y.o. male  who presents today for a postoperative visit.     Patient is here for a follow-up after a colonoscopy.  We removed a couple of polyps during that procedure and they came back consistent with benign adenomas.    Past History:  Medical History: has a past medical history of Abnormal blood chemistry (09/17/2014), Arthritis (09/16/2014), Arthropathy, unspecified, Cancer, Cervicogenic headache (08/09/2019), Emphysema of lung (02/09/2016), Heart attack, Hepatitis C, Hyperlipemia, Hyperlipidemia (09/16/2014), Hypertension (09/16/2014), Impaired fasting glucose (09/17/2014), Lung cancer, Lung disease, Lung nodule seen on imaging study (02/09/2016), Myocardial infarction, Shortness of breath, and Tobacco abuse (02/09/2016).   Surgical History: has a past surgical history that includes Coronary stent placement; Colonoscopy; Finger surgery; Hand surgery; Cardiac surgery; Cardiac valuve replacement; Hernia repair; Skin cancer excision; Tonsillectomy; and Colonoscopy (N/A, 4/17/2023).   Family History: family history includes Arthritis in his father; Breast cancer in an other family member; Cancer in his father and mother; Diabetes in his father; Stroke in an other family member.   Social History: reports that he quit smoking about 8 years ago. His smoking use included cigarettes. He started smoking about 62 years ago. He has a 62.00 pack-year smoking history. He has never used smokeless tobacco. He reports that he does not drink alcohol and does not use drugs.  Allergies: Patient has no known allergies.       Current Outpatient Medications:   •  albuterol sulfate  (90 Base) MCG/ACT inhaler, , Disp: , Rfl:   •  ascorbic acid (VITAMIN C) 1000 MG tablet, Vitamin C 1,000 mg oral tablet take 1 tablet by oral route daily   Active,  Disp: , Rfl:   •  aspirin 81 MG EC tablet, Take 1 tablet by mouth Daily., Disp: , Rfl:   •  atorvastatin (LIPITOR) 40 MG tablet, Take 1 tablet by mouth Every Night for 180 days., Disp: 90 tablet, Rfl: 1  •  cetirizine (zyrTEC) 10 MG tablet, , Disp: , Rfl:   •  Cetirizine HCl 10 MG capsule, Take 10 mg by mouth every night at bedtime., Disp: 90 capsule, Rfl: 1  •  cholecalciferol (VITAMIN D3) 25 MCG (1000 UT) tablet, , Disp: , Rfl:   •  Cholecalciferol 50 MCG (2000 UT) tablet, Take 1 tablet by mouth Daily., Disp: 90 each, Rfl: 1  •  cyclobenzaprine (FLEXERIL) 10 MG tablet, Take 1 tablet by mouth 3 (Three) Times a Day As Needed for Muscle Spasms., Disp: 90 tablet, Rfl: 1  •  Diclofenac Sodium (VOLTAREN) 1 % gel gel, Apply 4 g topically to the appropriate area as directed 4 (Four) Times a Day As Needed (joint)., Disp: 350 g, Rfl: 1  •  fluorouracil (EFUDEX) 5 % cream, fluorouracil 5 % topical cream apply a sufficient amount to cover the lesions in the affected area(s) by topical route 2 times per day   Active, Disp: , Rfl:   •  fluticasone (FLONASE) 50 MCG/ACT nasal spray, 2 sprays into the nostril(s) as directed by provider Daily. 2 sprays each nostril daily, Disp: 3 mL, Rfl: 1  •  gabapentin (NEURONTIN) 300 MG capsule, Take 1 capsule by mouth 3 (Three) Times a Day., Disp: 270 capsule, Rfl: 1  •  GARLIC PO, garlic 1,000 mg oral capsule take 1 capsule by oral route daily   Active, Disp: , Rfl:   •  isosorbide mononitrate (IMDUR) 60 MG 24 hr tablet, , Disp: , Rfl:   •  meclizine (ANTIVERT) 25 MG tablet, Take 1 tablet by mouth 3 (Three) Times a Day As Needed for Dizziness., Disp: 90 tablet, Rfl: 5  •  metoprolol succinate XL (TOPROL-XL) 50 MG 24 hr tablet, , Disp: , Rfl:   •  MoviPrep 100 g reconstituted solution powder, , Disp: , Rfl:   •  nitroglycerin (NITROSTAT) 0.3 MG SL tablet, , Disp: , Rfl:   •  nitroglycerin (NITROSTAT) 0.4 MG SL tablet, Place 1 tablet under the tongue., Disp: , Rfl:   •  simethicone (Gas-X) 80  "MG chewable tablet, Take 2 tablets PO after completing movi prep and 2 tablets PO 4 hours prior to procedure., Disp: 4 tablet, Rfl: 0  •  tamsulosin (FLOMAX) 0.4 MG capsule 24 hr capsule, Take 1 capsule by mouth Daily., Disp: 90 capsule, Rfl: 1  •  Tiotropium Bromide Monohydrate (Spiriva Respimat) 1.25 MCG/ACT aerosol solution inhaler, Inhale 2 puffs Daily., Disp: 3 each, Rfl: 1       Physical Exam  He is doing well and his abdomen is soft.  Objective     Vital Signs:   Resp 14   Ht 177.8 cm (70\")   Wt 83.5 kg (184 lb)   BMI 26.40 kg/m²              Assessment and Plan    Diagnoses and all orders for this visit:    1. Abnormal stool test (Primary)    I recommended he repeat this in 5 years.  Otherwise I will see him back on an as-needed basis.      "

## 2023-06-13 ENCOUNTER — TELEPHONE (OUTPATIENT)
Dept: FAMILY MEDICINE CLINIC | Facility: CLINIC | Age: 73
End: 2023-06-13
Payer: MEDICARE

## 2023-06-13 NOTE — TELEPHONE ENCOUNTER
LVM: need to reschedule his appt with Garrett that is on 07/0723 because she will be out of the office.       Hub to share.

## 2023-08-16 ENCOUNTER — OFFICE VISIT (OUTPATIENT)
Dept: FAMILY MEDICINE CLINIC | Facility: CLINIC | Age: 73
End: 2023-08-16
Payer: MEDICARE

## 2023-08-16 VITALS
OXYGEN SATURATION: 98 % | SYSTOLIC BLOOD PRESSURE: 130 MMHG | HEIGHT: 70 IN | HEART RATE: 64 BPM | WEIGHT: 185 LBS | BODY MASS INDEX: 26.48 KG/M2 | TEMPERATURE: 98.5 F | DIASTOLIC BLOOD PRESSURE: 73 MMHG

## 2023-08-16 DIAGNOSIS — M50.30 DDD (DEGENERATIVE DISC DISEASE), CERVICAL: ICD-10-CM

## 2023-08-16 DIAGNOSIS — N40.1 BENIGN PROSTATIC HYPERPLASIA WITH WEAK URINARY STREAM: ICD-10-CM

## 2023-08-16 DIAGNOSIS — I25.10 CORONARY ARTERY DISEASE INVOLVING NATIVE HEART WITHOUT ANGINA PECTORIS, UNSPECIFIED VESSEL OR LESION TYPE: ICD-10-CM

## 2023-08-16 DIAGNOSIS — Z79.899 MEDICATION MANAGEMENT: ICD-10-CM

## 2023-08-16 DIAGNOSIS — R73.01 IMPAIRED FASTING GLUCOSE: ICD-10-CM

## 2023-08-16 DIAGNOSIS — D69.6 THROMBOCYTOPENIA: ICD-10-CM

## 2023-08-16 DIAGNOSIS — I10 PRIMARY HYPERTENSION: Primary | ICD-10-CM

## 2023-08-16 DIAGNOSIS — R39.12 BENIGN PROSTATIC HYPERPLASIA WITH WEAK URINARY STREAM: ICD-10-CM

## 2023-08-16 DIAGNOSIS — E78.2 MIXED HYPERLIPIDEMIA: ICD-10-CM

## 2023-08-16 DIAGNOSIS — F51.01 PRIMARY INSOMNIA: ICD-10-CM

## 2023-08-16 DIAGNOSIS — J43.9 PULMONARY EMPHYSEMA, UNSPECIFIED EMPHYSEMA TYPE: ICD-10-CM

## 2023-08-16 DIAGNOSIS — M54.81 CERVICO-OCCIPITAL NEURALGIA: ICD-10-CM

## 2023-08-16 DIAGNOSIS — J30.2 SEASONAL ALLERGIES: ICD-10-CM

## 2023-08-16 DIAGNOSIS — Z13.29 SCREENING FOR THYROID DISORDER: ICD-10-CM

## 2023-08-16 LAB
ALBUMIN SERPL-MCNC: 4 G/DL (ref 3.5–5.2)
ALBUMIN/GLOB SERPL: 1.5 G/DL
ALP SERPL-CCNC: 99 U/L (ref 39–117)
ALT SERPL W P-5'-P-CCNC: 17 U/L (ref 1–41)
AMORPH URATE CRY URNS QL MICRO: ABNORMAL /HPF
AMPHET+METHAMPHET UR QL: NEGATIVE
AMPHETAMINE INTERNAL CONTROL: NORMAL
AMPHETAMINES UR QL: NEGATIVE
ANION GAP SERPL CALCULATED.3IONS-SCNC: 9.8 MMOL/L (ref 5–15)
AST SERPL-CCNC: 23 U/L (ref 1–40)
BACTERIA UR QL AUTO: ABNORMAL /HPF
BARBITURATE INTERNAL CONTROL: NORMAL
BARBITURATES UR QL SCN: NEGATIVE
BENZODIAZ UR QL SCN: NEGATIVE
BENZODIAZEPINE INTERNAL CONTROL: NORMAL
BILIRUB SERPL-MCNC: 0.6 MG/DL (ref 0–1.2)
BILIRUB UR QL STRIP: NEGATIVE
BUN SERPL-MCNC: 19 MG/DL (ref 8–23)
BUN/CREAT SERPL: 17.8 (ref 7–25)
BUPRENORPHINE INTERNAL CONTROL: NORMAL
BUPRENORPHINE SERPL-MCNC: NEGATIVE NG/ML
CALCIUM SPEC-SCNC: 8.6 MG/DL (ref 8.6–10.5)
CANNABINOIDS SERPL QL: NEGATIVE
CHLORIDE SERPL-SCNC: 106 MMOL/L (ref 98–107)
CHOLEST SERPL-MCNC: 119 MG/DL (ref 0–200)
CLARITY UR: CLEAR
CO2 SERPL-SCNC: 27.2 MMOL/L (ref 22–29)
COCAINE INTERNAL CONTROL: NORMAL
COCAINE UR QL: NEGATIVE
COD CRY URNS QL: ABNORMAL /HPF
COLOR UR: ABNORMAL
CREAT SERPL-MCNC: 1.07 MG/DL (ref 0.76–1.27)
DEPRECATED RDW RBC AUTO: 48.3 FL (ref 37–54)
EGFRCR SERPLBLD CKD-EPI 2021: 72.8 ML/MIN/1.73
ERYTHROCYTE [DISTWIDTH] IN BLOOD BY AUTOMATED COUNT: 13.8 % (ref 12.3–15.4)
EXPIRATION DATE: NORMAL
GLOBULIN UR ELPH-MCNC: 2.7 GM/DL
GLUCOSE SERPL-MCNC: 108 MG/DL (ref 65–99)
GLUCOSE UR STRIP-MCNC: NEGATIVE MG/DL
HBA1C MFR BLD: 6.1 % (ref 4.8–5.6)
HCT VFR BLD AUTO: 40.6 % (ref 37.5–51)
HDLC SERPL-MCNC: 45 MG/DL (ref 40–60)
HGB BLD-MCNC: 13.9 G/DL (ref 13–17.7)
HGB UR QL STRIP.AUTO: NEGATIVE
HYALINE CASTS UR QL AUTO: ABNORMAL /LPF
KETONES UR QL STRIP: ABNORMAL
LARGE PLATELETS: ABNORMAL
LDLC SERPL CALC-MCNC: 56 MG/DL (ref 0–100)
LDLC/HDLC SERPL: 1.22 {RATIO}
LEUKOCYTE ESTERASE UR QL STRIP.AUTO: ABNORMAL
LYMPHOCYTES # BLD MANUAL: 5.45 10*3/MM3 (ref 0.7–3.1)
LYMPHOCYTES NFR BLD MANUAL: 6 % (ref 5–12)
Lab: NORMAL
MCH RBC QN AUTO: 32.6 PG (ref 26.6–33)
MCHC RBC AUTO-ENTMCNC: 34.2 G/DL (ref 31.5–35.7)
MCV RBC AUTO: 95.3 FL (ref 79–97)
MDMA (ECSTASY) INTERNAL CONTROL: NORMAL
MDMA UR QL SCN: NEGATIVE
METHADONE INTERNAL CONTROL: NORMAL
METHADONE UR QL SCN: NEGATIVE
METHAMPHETAMINE INTERNAL CONTROL: NORMAL
MONOCYTES # BLD: 0.63 10*3/MM3 (ref 0.1–0.9)
MUCOUS THREADS URNS QL MICRO: ABNORMAL /HPF
NEUTROPHILS # BLD AUTO: 4.4 10*3/MM3 (ref 1.7–7)
NEUTROPHILS NFR BLD MANUAL: 42 % (ref 42.7–76)
NITRITE UR QL STRIP: NEGATIVE
OPIATES INTERNAL CONTROL: NORMAL
OPIATES UR QL: NEGATIVE
OXYCODONE INTERNAL CONTROL: NORMAL
OXYCODONE UR QL SCN: NEGATIVE
PATHOLOGY REVIEW: YES
PCP UR QL SCN: NEGATIVE
PH UR STRIP.AUTO: 8 [PH] (ref 5–8)
PHENCYCLIDINE INTERNAL CONTROL: NORMAL
PLATELET # BLD AUTO: 101 10*3/MM3 (ref 140–450)
PMV BLD AUTO: 13.3 FL (ref 6–12)
POTASSIUM SERPL-SCNC: 4.4 MMOL/L (ref 3.5–5.2)
PROT SERPL-MCNC: 6.7 G/DL (ref 6–8.5)
PROT UR QL STRIP: ABNORMAL
RBC # BLD AUTO: 4.26 10*6/MM3 (ref 4.14–5.8)
RBC # UR STRIP: ABNORMAL /HPF
RBC MORPH BLD: NORMAL
REF LAB TEST METHOD: ABNORMAL
SMALL PLATELETS BLD QL SMEAR: ADEQUATE
SODIUM SERPL-SCNC: 143 MMOL/L (ref 136–145)
SP GR UR STRIP: >=1.03 (ref 1–1.03)
SQUAMOUS #/AREA URNS HPF: ABNORMAL /HPF
THC INTERNAL CONTROL: NORMAL
TRIGL SERPL-MCNC: 95 MG/DL (ref 0–150)
TSH SERPL DL<=0.05 MIU/L-ACNC: 1.59 UIU/ML (ref 0.27–4.2)
UROBILINOGEN UR QL STRIP: ABNORMAL
VARIANT LYMPHS NFR BLD MANUAL: 52 % (ref 19.6–45.3)
VLDLC SERPL-MCNC: 18 MG/DL (ref 5–40)
WBC # UR STRIP: ABNORMAL /HPF
WBC MORPH BLD: NORMAL
WBC NRBC COR # BLD: 10.48 10*3/MM3 (ref 3.4–10.8)

## 2023-08-16 PROCEDURE — 80061 LIPID PANEL: CPT | Performed by: NURSE PRACTITIONER

## 2023-08-16 PROCEDURE — 80053 COMPREHEN METABOLIC PANEL: CPT | Performed by: NURSE PRACTITIONER

## 2023-08-16 PROCEDURE — 85007 BL SMEAR W/DIFF WBC COUNT: CPT | Performed by: NURSE PRACTITIONER

## 2023-08-16 PROCEDURE — 85025 COMPLETE CBC W/AUTO DIFF WBC: CPT | Performed by: NURSE PRACTITIONER

## 2023-08-16 PROCEDURE — 81001 URINALYSIS AUTO W/SCOPE: CPT | Performed by: NURSE PRACTITIONER

## 2023-08-16 PROCEDURE — 83036 HEMOGLOBIN GLYCOSYLATED A1C: CPT | Performed by: NURSE PRACTITIONER

## 2023-08-16 PROCEDURE — 84443 ASSAY THYROID STIM HORMONE: CPT | Performed by: NURSE PRACTITIONER

## 2023-08-16 RX ORDER — GABAPENTIN 300 MG/1
300 CAPSULE ORAL 3 TIMES DAILY
Qty: 270 CAPSULE | Refills: 1 | Status: SHIPPED | OUTPATIENT
Start: 2023-08-16

## 2023-08-16 RX ORDER — MECLIZINE HYDROCHLORIDE 25 MG/1
25 TABLET ORAL 3 TIMES DAILY PRN
Qty: 90 TABLET | Refills: 5 | Status: SHIPPED | OUTPATIENT
Start: 2023-08-16

## 2023-08-16 RX ORDER — ATORVASTATIN CALCIUM 40 MG/1
40 TABLET, FILM COATED ORAL NIGHTLY
Qty: 90 TABLET | Refills: 0 | Status: SHIPPED | OUTPATIENT
Start: 2023-08-16 | End: 2024-02-12

## 2023-08-16 RX ORDER — TAMSULOSIN HYDROCHLORIDE 0.4 MG/1
1 CAPSULE ORAL DAILY
Qty: 90 CAPSULE | Refills: 0 | Status: SHIPPED | OUTPATIENT
Start: 2023-08-16

## 2023-08-16 RX ORDER — KETOCONAZOLE 20 MG/ML
SHAMPOO TOPICAL
COMMUNITY
Start: 2023-06-22

## 2023-08-16 NOTE — PROGRESS NOTES
Follow Up Office Visit      Patient Name: Kamron Sanchez  : 1950   MRN: [id number]     Chief Complaint:    Chief Complaint   Patient presents with    Hypertension    Hyperlipidemia    Coronary Artery Disease    impaired fasting glucose    Insomnia    Neck Pain       History of Present Illness: Kamron Sanchez is a 73 y.o. male who is here today to follow up for HTN, hyperlipidemia, IFG, CAD, insomnia and chronic neck pain     Labs- 2023  Psa- 2022  cologard 2023 positive, colonoscopy  dr españa repeat in 5 years  Ct chest low dose 2022    Stopped smoking     Requests a refill of gabapentin for chronic neck pain     Subjective      Review of Systems:   Review of Systems   Constitutional:  Negative for fever.   HENT:  Negative for ear pain and sore throat.    Respiratory:  Negative for cough.    Cardiovascular:  Negative for chest pain.   Gastrointestinal:  Negative for abdominal pain, diarrhea, nausea and vomiting.   Genitourinary:  Negative for dysuria.   Musculoskeletal:  Positive for neck pain.      Past Medical History:   Past Medical History:   Diagnosis Date    Abnormal blood chemistry 2014    Elevated RBC count    Arthritis 2014    Arthropathy, unspecified     multiple sites    Cancer     Cervicogenic headache 2019    Emphysema of lung 2016    Heart attack     Hepatitis C     Hyperlipemia     Hyperlipidemia 2014    Hypertension 2014    Impaired fasting glucose 2014    Lung cancer     Lung disease     Lung nodule seen on imaging study 2016    Myocardial infarction     Shortness of breath     Tobacco abuse 2016       Past Surgical History:   Past Surgical History:   Procedure Laterality Date    CARDIAC SURGERY      CARDIAC VALVE SURGERY      COLONOSCOPY      COLONOSCOPY N/A 2023    Procedure: COLONOSCOPY WITH POLYPECTOMIES, HOT SNARE, CLIP APPLICATION X2;  Surgeon: Tamir España MD;  Location: [facility] ENDOSCOPY;  Service:  General;  Laterality: N/A;  COLON POLYPS    CORONARY STENT PLACEMENT      FINGER SURGERY      HAND SURGERY      HERNIA REPAIR      SKIN CANCER EXCISION      TONSILLECTOMY         Family History:   Family History   Problem Relation Age of Onset    Cancer Mother     Diabetes Father     Arthritis Father     Cancer Father     Stroke Other     Breast cancer Other        Social History:   Social History     Socioeconomic History    Marital status:    Tobacco Use    Smoking status: Former     Packs/day: 2.00     Years: 31.00     Pack years: 62.00     Types: Cigarettes     Start date:      Quit date:      Years since quittin.6    Smokeless tobacco: Never   Vaping Use    Vaping Use: Never used   Substance and Sexual Activity    Alcohol use: Never    Drug use: Never    Sexual activity: Defer       Medications:     Current Outpatient Medications:     albuterol sulfate  (90 Base) MCG/ACT inhaler, Inhale 2 puffs Every 4 (Four) Hours As Needed for Wheezing., Disp: 18 g, Rfl: 2    ascorbic acid (VITAMIN C) 1000 MG tablet, Vitamin C 1,000 mg oral tablet take 1 tablet by oral route daily   Active, Disp: , Rfl:     aspirin 81 MG EC tablet, Take 1 tablet by mouth Daily., Disp: , Rfl:     atorvastatin (LIPITOR) 40 MG tablet, Take 1 tablet by mouth Every Night for 180 days., Disp: 90 tablet, Rfl: 0    cetirizine (zyrTEC) 10 MG tablet, , Disp: , Rfl:     Cetirizine HCl 10 MG capsule, Take 10 mg by mouth every night at bedtime., Disp: 90 capsule, Rfl: 1    cholecalciferol (VITAMIN D3) 25 MCG (1000 UT) tablet, Take 2 tablets by mouth Daily., Disp: 180 tablet, Rfl: 0    Cholecalciferol 50 MCG (2000 UT) tablet, Take 1 tablet by mouth Daily., Disp: 90 each, Rfl: 1    cyclobenzaprine (FLEXERIL) 10 MG tablet, Take 1 tablet by mouth 3 (Three) Times a Day As Needed for Muscle Spasms., Disp: 90 tablet, Rfl: 1    Diclofenac Sodium (VOLTAREN) 1 % gel gel, Apply 4 g topically to the appropriate area as directed 4 (Four)  "Times a Day As Needed (joint)., Disp: 350 g, Rfl: 1    fluorouracil (EFUDEX) 5 % cream, fluorouracil 5 % topical cream apply a sufficient amount to cover the lesions in the affected area(s) by topical route 2 times per day   Active, Disp: , Rfl:     fluticasone (FLONASE) 50 MCG/ACT nasal spray, 2 sprays into the nostril(s) as directed by provider Daily. 2 sprays each nostril daily, Disp: 3 mL, Rfl: 1    gabapentin (NEURONTIN) 300 MG capsule, Take 1 capsule by mouth 3 (Three) Times a Day., Disp: 270 capsule, Rfl: 1    GARLIC PO, garlic 1,000 mg oral capsule take 1 capsule by oral route daily   Active, Disp: , Rfl:     isosorbide mononitrate (IMDUR) 60 MG 24 hr tablet, , Disp: , Rfl:     ketoconazole (NIZORAL) 2 % shampoo, , Disp: , Rfl:     meclizine (ANTIVERT) 25 MG tablet, Take 1 tablet by mouth 3 (Three) Times a Day As Needed for Dizziness., Disp: 90 tablet, Rfl: 5    metoprolol succinate XL (TOPROL-XL) 50 MG 24 hr tablet, , Disp: , Rfl:     MoviPrep 100 g reconstituted solution powder, , Disp: , Rfl:     nitroglycerin (NITROSTAT) 0.3 MG SL tablet, , Disp: , Rfl:     nitroglycerin (NITROSTAT) 0.4 MG SL tablet, Place 1 tablet under the tongue., Disp: , Rfl:     simethicone (Gas-X) 80 MG chewable tablet, Take 2 tablets PO after completing movi prep and 2 tablets PO 4 hours prior to procedure., Disp: 4 tablet, Rfl: 0    tamsulosin (FLOMAX) 0.4 MG capsule 24 hr capsule, Take 1 capsule by mouth Daily., Disp: 90 capsule, Rfl: 0    Tiotropium Bromide Monohydrate (Spiriva Respimat) 1.25 MCG/ACT aerosol solution inhaler, Inhale 2 puffs Daily., Disp: 3 each, Rfl: 1    Allergies:   No Known Allergies            Objective     Physical Exam:  Vital Signs:   Vitals:    08/16/23 1127   BP: 130/73   Pulse: 64   Temp: 98.5 øF (36.9 øC)   SpO2: 98%   Weight: 83.9 kg (185 lb)   Height: 177.8 cm (70\")     Body mass index is 26.54 kg/mý.           Physical Exam  HENT:      Right Ear: Tympanic membrane normal.      Left Ear: Tympanic " membrane normal.      Nose: Nose normal.      Mouth/Throat:      Mouth: Mucous membranes are moist.   Eyes:      Conjunctiva/sclera: Conjunctivae normal.   Neck:      Vascular: No carotid bruit.   Cardiovascular:      Rate and Rhythm: Normal rate and regular rhythm.      Heart sounds: Normal heart sounds. No murmur heard.  Pulmonary:      Effort: Pulmonary effort is normal.      Breath sounds: Normal breath sounds.   Abdominal:      General: Bowel sounds are normal.      Palpations: Abdomen is soft.   Musculoskeletal:      Right lower leg: No edema.      Left lower leg: No edema.   Skin:     General: Skin is warm and dry.   Neurological:      Mental Status: He is alert.   Psychiatric:         Mood and Affect: Mood normal.         Behavior: Behavior normal.           Assessment / Plan      Assessment/Plan:   Diagnoses and all orders for this visit:    1. Primary hypertension (Primary)  -     Cancel: CBC Auto Differential    2. Mixed hyperlipidemia  -     atorvastatin (LIPITOR) 40 MG tablet; Take 1 tablet by mouth Every Night for 180 days.  Dispense: 90 tablet; Refill: 0  -     Lipid Panel    3. Pulmonary emphysema, unspecified emphysema type    4. Coronary artery disease involving native heart without angina pectoris, unspecified vessel or lesion type    5. Impaired fasting glucose  -     Comprehensive Metabolic Panel  -     Hemoglobin A1c  -     Urinalysis With Culture If Indicated - Urine, Clean Catch    6. Benign prostatic hyperplasia with weak urinary stream  -     tamsulosin (FLOMAX) 0.4 MG capsule 24 hr capsule; Take 1 capsule by mouth Daily.  Dispense: 90 capsule; Refill: 0    7. Cervico-occipital neuralgia  -     gabapentin (NEURONTIN) 300 MG capsule; Take 1 capsule by mouth 3 (Three) Times a Day.  Dispense: 270 capsule; Refill: 1    8. DDD (degenerative disc disease), cervical  -     gabapentin (NEURONTIN) 300 MG capsule; Take 1 capsule by mouth 3 (Three) Times a Day.  Dispense: 270 capsule; Refill: 1    9.  "Primary insomnia    10. Thrombocytopenia  -     CBC & Differential    11. Medication management  -     POC Urine Drug Screen [facility] [employer]    12. Screening for thyroid disorder  -     TSH    13. Seasonal allergies    Other orders  -     meclizine (ANTIVERT) 25 MG tablet; Take 1 tablet by mouth 3 (Three) Times a Day As Needed for Dizziness.  Dispense: 90 tablet; Refill: 5  -     Cholecalciferol 50 MCG (2000 UT) tablet; Take 1 tablet by mouth Daily.  Dispense: 90 each; Refill: 1         Hypertension currently controlled metoprolol 50 mg daily  Hyperlipidemia obtain lipid panel and CMP to monitor current statin dose Lipitor 40 mg at nighttime denies myalgias  Pulmonary emphysema no wheezing or shortness of breath at this time currently using Spiriva inhaler albuterol as needed  Coronary artery disease currently on aspirin and statin per cardiology follow-up scheduled  Impaired fasting glucose obtain hemoglobin A1c to monitor recommend reduce carb intake increase lean protein intake active 30 minutes daily  BPH with urinary leakage currently controlled Flomax will provide refill  Cervico occipital neuralgia degenerative disc disease chronic neck pain currently controlled gabapentin Rufino reviewed urine drug screen obtained will provide refill  Insomnia currently controlled with gabapentin at this time  Thrombocytopenia we will obtain CBC to monitor  Seasonal allergies currently controlled Flonase Zyrtec    Follow Up:   Return in about 6 months (around 2/16/2024).    Julienne Harrison, GLORIA    \"Please note that portions of this note were completed with a voice recognition program.\"    "

## 2023-09-26 ENCOUNTER — HOSPITAL ENCOUNTER (OUTPATIENT)
Dept: CT IMAGING | Facility: HOSPITAL | Age: 73
Discharge: HOME | End: 2023-09-26
Admitting: NURSE PRACTITIONER
Payer: MEDICARE

## 2023-09-26 DIAGNOSIS — C34.90 NON-SMALL CELL LUNG CANCER, UNSPECIFIED LATERALITY: ICD-10-CM

## 2023-09-26 LAB
CREAT BLDA-MCNC: 1.1 MG/DL
EGFRCR SERPLBLD CKD-EPI 2021: 70.4 ML/MIN/1.73

## 2023-09-26 PROCEDURE — 71260 CT THORAX DX C+: CPT

## 2023-09-26 PROCEDURE — 25510000001 IOPAMIDOL PER 1 ML: Performed by: NURSE PRACTITIONER

## 2023-09-26 PROCEDURE — 82565 ASSAY OF CREATININE: CPT

## 2023-09-26 RX ADMIN — IOPAMIDOL 100 ML: 755 INJECTION, SOLUTION INTRAVENOUS at 09:40

## 2023-09-29 ENCOUNTER — OFFICE VISIT (OUTPATIENT)
Dept: ONCOLOGY | Facility: HOSPITAL | Age: 73
End: 2023-09-29
Payer: MEDICARE

## 2023-09-29 ENCOUNTER — LAB (OUTPATIENT)
Dept: ONCOLOGY | Facility: HOSPITAL | Age: 73
End: 2023-09-29
Payer: MEDICARE

## 2023-09-29 VITALS
HEART RATE: 56 BPM | WEIGHT: 185.85 LBS | SYSTOLIC BLOOD PRESSURE: 135 MMHG | BODY MASS INDEX: 26.67 KG/M2 | OXYGEN SATURATION: 97 % | RESPIRATION RATE: 18 BRPM | TEMPERATURE: 98.2 F | DIASTOLIC BLOOD PRESSURE: 74 MMHG

## 2023-09-29 DIAGNOSIS — D69.6 THROMBOCYTOPENIA: ICD-10-CM

## 2023-09-29 DIAGNOSIS — C34.90 NON-SMALL CELL LUNG CANCER, UNSPECIFIED LATERALITY: ICD-10-CM

## 2023-09-29 DIAGNOSIS — D69.6 THROMBOCYTOPENIA: Primary | ICD-10-CM

## 2023-09-29 DIAGNOSIS — R91.8 OTHER NONSPECIFIC ABNORMAL FINDING OF LUNG FIELD: ICD-10-CM

## 2023-09-29 DIAGNOSIS — C34.11 MALIGNANT NEOPLASM OF UPPER LOBE OF RIGHT LUNG: ICD-10-CM

## 2023-09-29 LAB
BASOPHILS # BLD AUTO: 0.04 10*3/MM3 (ref 0–0.2)
BASOPHILS NFR BLD AUTO: 0.4 % (ref 0–1.5)
BURR CELLS BLD QL SMEAR: ABNORMAL
DEPRECATED RDW RBC AUTO: 47.2 FL (ref 37–54)
EOSINOPHIL # BLD AUTO: 0.15 10*3/MM3 (ref 0–0.4)
EOSINOPHIL NFR BLD AUTO: 1.4 % (ref 0.3–6.2)
ERYTHROCYTE [DISTWIDTH] IN BLOOD BY AUTOMATED COUNT: 13.1 % (ref 12.3–15.4)
HCT VFR BLD AUTO: 40.5 % (ref 37.5–51)
HGB BLD-MCNC: 13.4 G/DL (ref 13–17.7)
IMM GRANULOCYTES # BLD AUTO: 0.02 10*3/MM3 (ref 0–0.05)
IMM GRANULOCYTES NFR BLD AUTO: 0.2 % (ref 0–0.5)
LYMPHOCYTES # BLD AUTO: 6.34 10*3/MM3 (ref 0.7–3.1)
LYMPHOCYTES # BLD MANUAL: 7.07 10*3/MM3 (ref 0.7–3.1)
LYMPHOCYTES NFR BLD AUTO: 58.3 % (ref 19.6–45.3)
LYMPHOCYTES NFR BLD MANUAL: 9 % (ref 5–12)
MCH RBC QN AUTO: 31.8 PG (ref 26.6–33)
MCHC RBC AUTO-ENTMCNC: 33.1 G/DL (ref 31.5–35.7)
MCV RBC AUTO: 96 FL (ref 79–97)
MONOCYTES # BLD AUTO: 1.47 10*3/MM3 (ref 0.1–0.9)
MONOCYTES # BLD: 0.98 10*3/MM3 (ref 0.1–0.9)
MONOCYTES NFR BLD AUTO: 13.5 % (ref 5–12)
NEUTROPHILS # BLD AUTO: 2.83 10*3/MM3 (ref 1.7–7)
NEUTROPHILS NFR BLD AUTO: 2.85 10*3/MM3 (ref 1.7–7)
NEUTROPHILS NFR BLD AUTO: 26.2 % (ref 42.7–76)
NEUTROPHILS NFR BLD MANUAL: 26 % (ref 42.7–76)
PLATELET # BLD AUTO: 76 10*3/MM3 (ref 140–450)
PMV BLD AUTO: 13.3 FL (ref 6–12)
RBC # BLD AUTO: 4.22 10*6/MM3 (ref 4.14–5.8)
SMALL PLATELETS BLD QL SMEAR: ABNORMAL
VARIANT LYMPHS NFR BLD MANUAL: 65 % (ref 19.6–45.3)
WBC MORPH BLD: NORMAL
WBC NRBC COR # BLD: 10.87 10*3/MM3 (ref 3.4–10.8)

## 2023-09-29 PROCEDURE — G0463 HOSPITAL OUTPT CLINIC VISIT: HCPCS | Performed by: INTERNAL MEDICINE

## 2023-09-29 PROCEDURE — 85007 BL SMEAR W/DIFF WBC COUNT: CPT

## 2023-09-29 PROCEDURE — 85025 COMPLETE CBC W/AUTO DIFF WBC: CPT

## 2023-09-29 PROCEDURE — 36415 COLL VENOUS BLD VENIPUNCTURE: CPT

## 2023-09-29 RX ORDER — METHOCARBAMOL 500 MG/1
TABLET, FILM COATED ORAL
COMMUNITY

## 2023-09-29 RX ORDER — BENZONATATE 100 MG/1
CAPSULE ORAL
COMMUNITY

## 2023-09-29 RX ORDER — TRAMADOL HYDROCHLORIDE 50 MG/1
TABLET ORAL
COMMUNITY

## 2023-09-29 RX ORDER — LIDOCAINE 50 MG/G
PATCH TOPICAL
COMMUNITY

## 2023-09-29 RX ORDER — CLOPIDOGREL BISULFATE 75 MG/1
TABLET ORAL
COMMUNITY

## 2023-09-29 NOTE — PROGRESS NOTES
Chief Complaint  Lung Cancer      Colasanti, Chrysalis An*  Colasanti, Chrysalis Dianne, APRN      Subjective          Kamron Sanchez presents to [facility] for  NSCLC    History of Present Illness   Mr. Kamron Sanchez presents for 1 year follow up for RUL NSCLC in April of 2016. He is status post RUL lobectomy with invasive adenocarcinoma with Dr. Heller from  2016.     He is followed yearly for CT scans. He also has has thrombocytocytopenia on previous labs in the past. He has been changed to baby aspirin from plavix. As of 8/16/23 plts improved to 101. Low of 45k 7/2021. Doing well today. No acute complaints. No fevers, chills, infections. Follow up CT scan showed enlargement of several right hilar and mediastinal nodes. There are also enlarged nodes in the abdomen. Will get PET scan.       Cancer Staging  No matching staging information was found for the patient.     Treatment intent: curative    Oncology/Hematology History    No history exists.     1) RUL NSCLC: -Right upper lobe lobectomy 4/6/16:  Staging: pT2aN0. Path:     Invasive adenocarcinoma, grade 3, 2 cm in size. Visceral pleura invasion with     lymphvascular invasion. Margins neg, vascular neg, neg parenchymal. 0 of 15 LN's    negative in [place] with Dr. Heller.             Treatment history:       -1/26/16: Patient presented with right shoulder pain and this included a chest     x-ray on 01/26 which showed an ill defined 3 cm opacity in the right lung base.           2/8/16:  Chest CT showed a 1.3 cm spiculated pleural based nodule anteriorly in     the right upper lobe suspicious for malignancy.  Also noted on the CT scan was     adenopathy adjacent to the GE junction measuring 2 cm and PET scan was     recommended.        2/15/16: PET scan showed hypermetabolic uptake in the right upper lobe pulmonary    nodule with a maximum SUV of 4.6.  There was a small focus of activity in the jesus    bcarinal region that was  not significantly above the mediastinal blood pool     activity and was felt to be unlikely to represent metastatic disease.  The area     of suspected adenopathy that was seen near the GE junction on CT was not     hypermetabolic on PET.  There was a sclerotic lesion noted in the right iliac     bone which was also not hypermetabolic and was felt unlikely to represent met    astatic lesion.  The patient was referred to oncology for further     recommendations. At the time of his initial visit, he was referred for biopsy of    the nodule.  He was complaining of some periodic headaches and MRI brain was     ordered.  There were no findings to suggest intracranial metastasis.  He     underwent biopsy of the RUL lesion on 3/2.  This was positive for adenocarcinoma    and also stained positive for CDX2, raising concern for GI primary.  He was     referred for upper and lower endoscopy and underwent these on 3/10 under the     care of Dr. España.  Pathology from a stomach biopsy showed chronic gastritis     and staining was positive for H pylori.  No evidence of GI malignancy was found     on EGD or colonoscopy.  He was referred to  for determination of his surgical     candidacy and underwent surgery on 4/6 with Dr. Macarena Heller.      -4/6/16:  RUL lobectomy under the care of Dr. Heller. Path: RUL lobectomy -    invasive adenocarcinoma, grade 3 (pT2aN0). -tumor size 2cm -histologic type:     adenocarcinoma -INVASION OF VISCERAL PLEURA. LYMPHOVASCULAR INVASION. Margins: -    bronchial: negative -vascular: negative -parenchymal: negative -distance to     closest margin: 1.8cm (bronchial). Ratio of positive/total nodes: 0/15.       -4/6/16.Adjuvant chemotherapy was recommended due to high risk features.      Declined chemotherapy.       -12/9/16: PET/CT showed postsurgical changes status post interval right upper     lobectomy with removal of right upper lobe cancer. No evidence of local     recurrence or thoracic  lymphadenopathy. No PET/CT evidence of metastasis within     neck, abdomen, or pelvis.      -July 2017.  Chest CT showed a decrease in size of the mediastinal and hilar     lymph nodes from 12/09/2016.  No convincing evidence of metastatic disease      -12/27/17: CT chest at [facility] showed no evidence of cancer.  12    mm subcarinal lymph node was 15 mm previously.      -8/1/2019: No evidence of local recurrence or metastasis within the CAP.    8/2020: CT chest: No evidence of recurrence or metastasis.     8/2021: CT chest: no evidence of recurrence or metastasis.   Review of Systems   Constitutional:  Negative for appetite change, diaphoresis, fatigue, fever, unexpected weight gain and unexpected weight loss.   HENT:  Negative for hearing loss, sore throat and voice change.    Eyes:  Negative for blurred vision, double vision, pain, redness and visual disturbance.   Respiratory:  Negative for cough, shortness of breath and wheezing.    Cardiovascular:  Negative for chest pain, palpitations and leg swelling.   Endocrine: Negative for cold intolerance, heat intolerance, polydipsia and polyuria.   Genitourinary:  Negative for decreased urine volume, difficulty urinating, frequency and urinary incontinence.   Musculoskeletal:  Negative for arthralgias, back pain, joint swelling and myalgias.   Skin:  Negative for color change, rash, skin lesions and wound.   Neurological:  Negative for dizziness, seizures, numbness and headache.   Hematological:  Negative for adenopathy. Does not bruise/bleed easily.   Psychiatric/Behavioral:  Negative for depressed mood. The patient is not nervous/anxious.    All other systems reviewed and are negative.    Current Outpatient Medications on File Prior to Visit   Medication Sig Dispense Refill    albuterol sulfate  (90 Base) MCG/ACT inhaler Inhale 2 puffs Every 4 (Four) Hours As Needed for Wheezing. 18 g 2    ascorbic acid (VITAMIN C) 1000 MG tablet Vitamin C  1,000 mg oral tablet take 1 tablet by oral route daily   Active      aspirin 81 MG EC tablet Take 1 tablet by mouth Daily.      atorvastatin (LIPITOR) 40 MG tablet Take 1 tablet by mouth Every Night for 180 days. 90 tablet 0    benzonatate (TESSALON) 100 MG capsule       cetirizine (zyrTEC) 10 MG tablet       Cetirizine HCl 10 MG capsule Take 10 mg by mouth every night at bedtime. 90 capsule 1    cholecalciferol (VITAMIN D3) 25 MCG (1000 UT) tablet Take 2 tablets by mouth Daily. 180 tablet 0    Cholecalciferol 50 MCG (2000 UT) tablet Take 1 tablet by mouth Daily. 90 each 1    clopidogrel (PLAVIX) 75 MG tablet       cyclobenzaprine (FLEXERIL) 10 MG tablet Take 1 tablet by mouth 3 (Three) Times a Day As Needed for Muscle Spasms. 90 tablet 1    Diclofenac Sodium (VOLTAREN) 1 % gel gel Apply 4 g topically to the appropriate area as directed 4 (Four) Times a Day As Needed (joint). 350 g 1    fluorouracil (EFUDEX) 5 % cream fluorouracil 5 % topical cream apply a sufficient amount to cover the lesions in the affected area(s) by topical route 2 times per day   Active      fluticasone (FLONASE) 50 MCG/ACT nasal spray 2 sprays into the nostril(s) as directed by provider Daily. 2 sprays each nostril daily 3 mL 1    gabapentin (NEURONTIN) 300 MG capsule Take 1 capsule by mouth 3 (Three) Times a Day. 270 capsule 1    GARLIC PO garlic 1,000 mg oral capsule take 1 capsule by oral route daily   Active      isosorbide mononitrate (IMDUR) 60 MG 24 hr tablet       ketoconazole (NIZORAL) 2 % shampoo       lidocaine (LIDODERM) 5 %       meclizine (ANTIVERT) 25 MG tablet Take 1 tablet by mouth 3 (Three) Times a Day As Needed for Dizziness. 90 tablet 5    methocarbamol (ROBAXIN) 500 MG tablet       metoprolol succinate XL (TOPROL-XL) 50 MG 24 hr tablet       MoviPrep 100 g reconstituted solution powder       nitroglycerin (NITROSTAT) 0.3 MG SL tablet       nitroglycerin (NITROSTAT) 0.4 MG SL tablet Place 1 tablet under the tongue.       simethicone (Gas-X) 80 MG chewable tablet Take 2 tablets PO after completing movi prep and 2 tablets PO 4 hours prior to procedure. 4 tablet 0    tamsulosin (FLOMAX) 0.4 MG capsule 24 hr capsule Take 1 capsule by mouth Daily. 90 capsule 0    Tiotropium Bromide Monohydrate (Spiriva Respimat) 1.25 MCG/ACT aerosol solution inhaler Inhale 2 puffs Daily. 3 each 1    traMADol (ULTRAM) 50 MG tablet        No current facility-administered medications on file prior to visit.       No Known Allergies  Past Medical History:   Diagnosis Date    Abnormal blood chemistry 2014    Elevated RBC count    Arthritis 2014    Arthropathy, unspecified     multiple sites    Cancer     Cervicogenic headache 2019    Emphysema of lung 2016    Heart attack     Hepatitis C     Hyperlipemia     Hyperlipidemia 2014    Hypertension 2014    Impaired fasting glucose 2014    Lung cancer     Lung disease     Lung nodule seen on imaging study 2016    Myocardial infarction     Shortness of breath     Tobacco abuse 2016     Past Surgical History:   Procedure Laterality Date    CARDIAC SURGERY      CARDIAC VALVE SURGERY      COLONOSCOPY      COLONOSCOPY N/A 2023    Procedure: COLONOSCOPY WITH POLYPECTOMIES, HOT SNARE, CLIP APPLICATION X2;  Surgeon: Tamir España MD;  Location: [facility] ENDOSCOPY;  Service: General;  Laterality: N/A;  COLON POLYPS    CORONARY STENT PLACEMENT      FINGER SURGERY      HAND SURGERY      HERNIA REPAIR      SKIN CANCER EXCISION      TONSILLECTOMY       Social History     Socioeconomic History    Marital status:    Tobacco Use    Smoking status: Former     Packs/day: 2.00     Years: 31.00     Pack years: 62.00     Types: Cigarettes     Start date:      Quit date:      Years since quittin.7    Smokeless tobacco: Never   Vaping Use    Vaping Use: Never used   Substance and Sexual Activity    Alcohol use: Never    Drug use: Never    Sexual activity:  Defer     Family History   Problem Relation Age of Onset    Cancer Mother     Diabetes Father     Arthritis Father     Cancer Father     Stroke Other     Breast cancer Other      Immunization History   Administered Date(s) Administered    COVID-19 (MODERNA) 1st,2nd,3rd Dose Monovalent 03/03/2021, 03/31/2021    COVID-19 (MODERNA) Monovalent Original Booster 11/02/2021    Influenza, Unspecified 01/06/2021    Pneumococcal Polysaccharide (PPSV23) 01/06/2021    Tdap 07/01/2018       Objective   Physical Exam  Constitutional:       Appearance: Normal appearance.   HENT:      Head: Normocephalic and atraumatic.      Nose: Nose normal.   Eyes:      Conjunctiva/sclera: Conjunctivae normal.   Pulmonary:      Effort: Pulmonary effort is normal.   Neurological:      General: No focal deficit present.      Mental Status: He is alert. Mental status is at baseline.   Psychiatric:         Mood and Affect: Mood normal.         Behavior: Behavior normal.         Thought Content: Thought content normal.       Vitals:    09/29/23 0955   BP: 135/74   Pulse: 56   Resp: 18   Temp: 98.2 °F (36.8 °C)   TempSrc: Temporal   SpO2: 97%   Weight: 84.3 kg (185 lb 13.6 oz)   PainSc: 0-No pain               ECOG: (0) Fully Active - Able to Carry On All Pre-disease Performance Without Restriction  Fall Risk Assessment was completed, and patient is at low risk for falls.  PHQ-9 Total Score:         The patient is  experiencing fatigue. Fatigue score: 2    PT/OT Functional Screening: PT fx screen: No needs identified  Speech Functional Screening: Speech fx screen: No needs identified  Rehab to be ordered: Rehab to be ordered: No needs identified        Result Review :   The following data was reviewed by: Adriano Willard MD on 09/29/23:  Lab Results   Component Value Date    HGB 13.9 08/16/2023    HCT 40.6 08/16/2023    MCV 95.3 08/16/2023     (L) 08/16/2023    WBC 10.48 08/16/2023    NEUTROABS 4.40 08/16/2023    LYMPHSABS 4.33 (H)  11/07/2022    MONOSABS 1.15 (H) 11/07/2022    EOSABS 0.26 01/06/2023    BASOSABS 0.03 11/07/2022     Lab Results   Component Value Date    GLUCOSE 108 (H) 08/16/2023    BUN 19 08/16/2023    CREATININE 1.10 09/26/2023     08/16/2023    K 4.4 08/16/2023     08/16/2023    CO2 27.2 08/16/2023    CALCIUM 8.6 08/16/2023    PROTEINTOT 6.7 08/16/2023    ALBUMIN 4.0 08/16/2023    BILITOT 0.6 08/16/2023    ALKPHOS 99 08/16/2023    AST 23 08/16/2023    ALT 17 08/16/2023     Labs personally reviewed and plts increased to 101    CT Chest personally reviewed with enlarging nodes.    CT Chest With Contrast Diagnostic    Result Date: 9/26/2023    1. The lymphadenopathy in the jacinta mediastinum has increased in size.  This is suspicious for residual recurrent disease.  There are pathologically enlarged lymph nodes in the upper abdomen.  Whole body PET-CT is recommended for better evaluation. 2. Changes of right upper lobectomy.  Moderate emphysema.     CORY TORRES MD       Electronically Signed and Approved By: CORY TORRES MD on 9/26/2023 at 13:04                   Assessment and Plan    Diagnoses and all orders for this visit:    1. Thrombocytopenia (Primary)  -     CBC & Differential; Future    2. Non-small cell lung cancer, unspecified laterality  -     NM PET/CT Skull Base to Mid Thigh; Future    3. Other nonspecific abnormal finding of lung field  -     NM PET/CT Skull Base to Mid Thigh; Future    4. Malignant neoplasm of upper lobe of right lung    NSCLC  Initially treated with RUL lobectomy 4/2016. Reviewed CT scan with patient which demonstrates enlargement in several lymph nodes in mediastinum and right hilum as well as some upper abdominal lymph nodes. IN setting of history of lung cancer, will evaluate further with PET scan. If positive per PET, will need biopsy. Treatment based off stage. RTC after PET    Thrombocytopenia  Plts up to 101 as of 8/2023, shun of 45K 7/2021. Could have been from plavix. He has  been switched to aspirin from plavix so this may be helping.  Will continue to check plt count periodically.         Patient Follow Up: after PET  Patient was given instructions and counseling regarding his condition or for health maintenance advice. Please see specific information pulled into the AVS if appropriate.

## 2023-10-10 ENCOUNTER — HOSPITAL ENCOUNTER (OUTPATIENT)
Dept: PET IMAGING | Facility: HOSPITAL | Age: 73
Discharge: HOME | End: 2023-10-10
Payer: MEDICARE

## 2023-10-10 DIAGNOSIS — R91.8 OTHER NONSPECIFIC ABNORMAL FINDING OF LUNG FIELD: ICD-10-CM

## 2023-10-10 DIAGNOSIS — C34.90 NON-SMALL CELL LUNG CANCER, UNSPECIFIED LATERALITY: ICD-10-CM

## 2023-10-10 PROCEDURE — A9552 F18 FDG: HCPCS | Performed by: INTERNAL MEDICINE

## 2023-10-10 PROCEDURE — 78815 PET IMAGE W/CT SKULL-THIGH: CPT

## 2023-10-10 PROCEDURE — 0 FLUDEOXYGLUCOSE F18 SOLUTION: Performed by: INTERNAL MEDICINE

## 2023-10-10 RX ADMIN — FLUDEOXYGLUCOSE F18 1 DOSE: 300 INJECTION INTRAVENOUS at 09:41

## 2023-10-12 ENCOUNTER — OFFICE VISIT (OUTPATIENT)
Dept: ONCOLOGY | Facility: HOSPITAL | Age: 73
End: 2023-10-12
Payer: MEDICARE

## 2023-10-12 VITALS
BODY MASS INDEX: 26.92 KG/M2 | HEIGHT: 70 IN | DIASTOLIC BLOOD PRESSURE: 94 MMHG | RESPIRATION RATE: 18 BRPM | SYSTOLIC BLOOD PRESSURE: 146 MMHG | TEMPERATURE: 97.3 F | WEIGHT: 188.05 LBS | HEART RATE: 64 BPM | OXYGEN SATURATION: 98 %

## 2023-10-12 DIAGNOSIS — D69.6 THROMBOCYTOPENIA: Primary | ICD-10-CM

## 2023-10-12 DIAGNOSIS — C34.11 MALIGNANT NEOPLASM OF UPPER LOBE OF RIGHT LUNG: ICD-10-CM

## 2023-10-12 NOTE — PROGRESS NOTES
Chief Complaint  Lung Cancer      Colasanti, Chrysalis An*  Colasanti, Chrysalis Dianne, APRN      Subjective          Kamron Sanchez presents to [facility] for  NSCLC    Lung Cancer  Pertinent negatives include no arthralgias, chest pain, coughing, diaphoresis, fatigue, fever, joint swelling, myalgias, numbness, rash or sore throat.      Mr. Kamron Sanchez presents for 1 year follow up for RUL NSCLC in April of 2016. He is status post RUL lobectomy with invasive adenocarcinoma with Dr. Heller from  2016.     He is followed yearly for CT scans. He also has has thrombocytocytopenia on previous labs in the past. He has been changed to baby aspirin from plavix. Takes this daily. As of 8/16/23 plts improved to 101. Low of 45k 7/2021. Now down to 76K as of 9/29/23. Doing well today. No acute complaints. Does have easy bruising. No bleeding. No fevers, chills, infections. Follow up PET scan was negative for any hypermetabolic activity. Result discussed with patient.       Cancer Staging  No matching staging information was found for the patient.     Treatment intent: curative    Oncology/Hematology History   Lung cancer   4/18/2016 Cancer Staged    Staging form: Lung, AJCC 8th Edition  - Pathologic stage from 4/18/2016: Stage IB (pT2a, pN0, cM0) - Signed by Adriano Willard MD on 9/29/2023 7/6/2021 Initial Diagnosis    Lung cancer       1) RUL NSCLC: -Right upper lobe lobectomy 4/6/16:  Staging: pT2aN0. Path:     Invasive adenocarcinoma, grade 3, 2 cm in size. Visceral pleura invasion with     lymphvascular invasion. Margins neg, vascular neg, neg parenchymal. 0 of 15 LN's    negative in [place] with Dr. Heller.             Treatment history:       -1/26/16: Patient presented with right shoulder pain and this included a chest     x-ray on 01/26 which showed an ill defined 3 cm opacity in the right lung base.           2/8/16:  Chest CT showed a 1.3 cm spiculated pleural based  nodule anteriorly in     the right upper lobe suspicious for malignancy.  Also noted on the CT scan was     adenopathy adjacent to the GE junction measuring 2 cm and PET scan was     recommended.        2/15/16: PET scan showed hypermetabolic uptake in the right upper lobe pulmonary    nodule with a maximum SUV of 4.6.  There was a small focus of activity in the jesus    bcarinal region that was not significantly above the mediastinal blood pool     activity and was felt to be unlikely to represent metastatic disease.  The area     of suspected adenopathy that was seen near the GE junction on CT was not     hypermetabolic on PET.  There was a sclerotic lesion noted in the right iliac     bone which was also not hypermetabolic and was felt unlikely to represent met    astatic lesion.  The patient was referred to oncology for further     recommendations. At the time of his initial visit, he was referred for biopsy of    the nodule.  He was complaining of some periodic headaches and MRI brain was     ordered.  There were no findings to suggest intracranial metastasis.  He     underwent biopsy of the RUL lesion on 3/2.  This was positive for adenocarcinoma    and also stained positive for CDX2, raising concern for GI primary.  He was     referred for upper and lower endoscopy and underwent these on 3/10 under the     care of Dr. España.  Pathology from a stomach biopsy showed chronic gastritis     and staining was positive for H pylori.  No evidence of GI malignancy was found     on EGD or colonoscopy.  He was referred to  for determination of his surgical     candidacy and underwent surgery on 4/6 with Dr. Macarena Heller.      -4/6/16:  RUL lobectomy under the care of Dr. Heller. Path: RUL lobectomy -    invasive adenocarcinoma, grade 3 (pT2aN0). -tumor size 2cm -histologic type:     adenocarcinoma -INVASION OF VISCERAL PLEURA. LYMPHOVASCULAR INVASION. Margins: -    bronchial: negative -vascular: negative -parenchymal:  negative -distance to     closest margin: 1.8cm (bronchial). Ratio of positive/total nodes: 0/15.       -4/6/16.Adjuvant chemotherapy was recommended due to high risk features.      Declined chemotherapy.       -12/9/16: PET/CT showed postsurgical changes status post interval right upper     lobectomy with removal of right upper lobe cancer. No evidence of local     recurrence or thoracic lymphadenopathy. No PET/CT evidence of metastasis within     neck, abdomen, or pelvis.      -July 2017.  Chest CT showed a decrease in size of the mediastinal and hilar     lymph nodes from 12/09/2016.  No convincing evidence of metastatic disease      -12/27/17: CT chest at the [facility] showed no evidence of cancer.  12    mm subcarinal lymph node was 15 mm previously.      -8/1/2019: No evidence of local recurrence or metastasis within the CAP.    8/2020: CT chest: No evidence of recurrence or metastasis.     8/2021: CT chest: no evidence of recurrence or metastasis.     Review of Systems   Constitutional:  Negative for appetite change, diaphoresis, fatigue, fever, unexpected weight gain and unexpected weight loss.   HENT:  Negative for hearing loss, sore throat and voice change.    Eyes:  Negative for blurred vision, double vision, pain, redness and visual disturbance.   Respiratory:  Negative for cough, shortness of breath and wheezing.    Cardiovascular:  Negative for chest pain, palpitations and leg swelling.   Endocrine: Negative for cold intolerance, heat intolerance, polydipsia and polyuria.   Genitourinary:  Negative for decreased urine volume, difficulty urinating, frequency and urinary incontinence.   Musculoskeletal:  Negative for arthralgias, back pain, joint swelling and myalgias.   Skin:  Negative for color change, rash, skin lesions and wound.   Neurological:  Negative for dizziness, seizures, numbness and headache.   Hematological:  Negative for adenopathy. Does not bruise/bleed easily.    Psychiatric/Behavioral:  Negative for depressed mood. The patient is not nervous/anxious.    All other systems reviewed and are negative.      Current Outpatient Medications on File Prior to Visit   Medication Sig Dispense Refill    albuterol sulfate  (90 Base) MCG/ACT inhaler Inhale 2 puffs Every 4 (Four) Hours As Needed for Wheezing. 18 g 2    ascorbic acid (VITAMIN C) 1000 MG tablet Vitamin C 1,000 mg oral tablet take 1 tablet by oral route daily   Active      aspirin 81 MG EC tablet Take 1 tablet by mouth Daily.      atorvastatin (LIPITOR) 40 MG tablet Take 1 tablet by mouth Every Night for 180 days. 90 tablet 0    benzonatate (TESSALON) 100 MG capsule       cetirizine (zyrTEC) 10 MG tablet       Cetirizine HCl 10 MG capsule Take 10 mg by mouth every night at bedtime. 90 capsule 1    cholecalciferol (VITAMIN D3) 25 MCG (1000 UT) tablet Take 2 tablets by mouth Daily. 180 tablet 0    Cholecalciferol 50 MCG (2000 UT) tablet Take 1 tablet by mouth Daily. 90 each 1    clopidogrel (PLAVIX) 75 MG tablet       cyclobenzaprine (FLEXERIL) 10 MG tablet Take 1 tablet by mouth 3 (Three) Times a Day As Needed for Muscle Spasms. 90 tablet 1    Diclofenac Sodium (VOLTAREN) 1 % gel gel Apply 4 g topically to the appropriate area as directed 4 (Four) Times a Day As Needed (joint). 350 g 1    fluorouracil (EFUDEX) 5 % cream fluorouracil 5 % topical cream apply a sufficient amount to cover the lesions in the affected area(s) by topical route 2 times per day   Active      fluticasone (FLONASE) 50 MCG/ACT nasal spray 2 sprays into the nostril(s) as directed by provider Daily. 2 sprays each nostril daily 3 mL 1    gabapentin (NEURONTIN) 300 MG capsule Take 1 capsule by mouth 3 (Three) Times a Day. 270 capsule 1    GARLIC PO garlic 1,000 mg oral capsule take 1 capsule by oral route daily   Active      isosorbide mononitrate (IMDUR) 60 MG 24 hr tablet       ketoconazole (NIZORAL) 2 % shampoo       lidocaine (LIDODERM) 5 %        meclizine (ANTIVERT) 25 MG tablet Take 1 tablet by mouth 3 (Three) Times a Day As Needed for Dizziness. 90 tablet 5    methocarbamol (ROBAXIN) 500 MG tablet       metoprolol succinate XL (TOPROL-XL) 50 MG 24 hr tablet       MoviPrep 100 g reconstituted solution powder       nitroglycerin (NITROSTAT) 0.3 MG SL tablet       nitroglycerin (NITROSTAT) 0.4 MG SL tablet Place 1 tablet under the tongue.      simethicone (Gas-X) 80 MG chewable tablet Take 2 tablets PO after completing movi prep and 2 tablets PO 4 hours prior to procedure. 4 tablet 0    tamsulosin (FLOMAX) 0.4 MG capsule 24 hr capsule Take 1 capsule by mouth Daily. 90 capsule 0    Tiotropium Bromide Monohydrate (Spiriva Respimat) 1.25 MCG/ACT aerosol solution inhaler Inhale 2 puffs Daily. 3 each 1    traMADol (ULTRAM) 50 MG tablet        No current facility-administered medications on file prior to visit.       No Known Allergies  Past Medical History:   Diagnosis Date    Abnormal blood chemistry 09/17/2014    Elevated RBC count    Arthritis 09/16/2014    Arthropathy, unspecified     multiple sites    Cancer     Cervicogenic headache 08/09/2019    Emphysema of lung 02/09/2016    Heart attack     Hepatitis C     Hyperlipemia     Hyperlipidemia 09/16/2014    Hypertension 09/16/2014    Impaired fasting glucose 09/17/2014    Lung cancer     Lung disease     Lung nodule seen on imaging study 02/09/2016    Myocardial infarction     Shortness of breath     Tobacco abuse 02/09/2016     Past Surgical History:   Procedure Laterality Date    CARDIAC SURGERY      CARDIAC VALVE SURGERY      COLONOSCOPY      COLONOSCOPY N/A 4/17/2023    Procedure: COLONOSCOPY WITH POLYPECTOMIES, HOT SNARE, CLIP APPLICATION X2;  Surgeon: Tamir España MD;  Location: [facility] ENDOSCOPY;  Service: General;  Laterality: N/A;  COLON POLYPS    CORONARY STENT PLACEMENT      FINGER SURGERY      HAND SURGERY      HERNIA REPAIR      SKIN CANCER EXCISION      TONSILLECTOMY       Social History  "    Socioeconomic History    Marital status:    Tobacco Use    Smoking status: Former     Packs/day: 2.00     Years: 31.00     Additional pack years: 0.00     Total pack years: 62.00     Types: Cigarettes     Start date:      Quit date:      Years since quittin.7    Smokeless tobacco: Never   Vaping Use    Vaping Use: Never used   Substance and Sexual Activity    Alcohol use: Never    Drug use: Never    Sexual activity: Defer     Family History   Problem Relation Age of Onset    Cancer Mother     Diabetes Father     Arthritis Father     Cancer Father     Stroke Other     Breast cancer Other      Immunization History   Administered Date(s) Administered    COVID-19 (MODERNA) 1st,2nd,3rd Dose Monovalent 2021, 2021    COVID-19 (MODERNA) Monovalent Original Booster 2021    Influenza, Unspecified 2021    Pneumococcal Polysaccharide (PPSV23) 2021    Tdap 2018       Objective   Physical Exam  Constitutional:       Appearance: Normal appearance.   HENT:      Head: Normocephalic and atraumatic.      Nose: Nose normal.      Mouth/Throat:      Mouth: Mucous membranes are moist.   Eyes:      Conjunctiva/sclera: Conjunctivae normal.   Pulmonary:      Effort: Pulmonary effort is normal.   Neurological:      General: No focal deficit present.      Mental Status: He is alert. Mental status is at baseline.   Psychiatric:         Mood and Affect: Mood normal.         Behavior: Behavior normal.         Thought Content: Thought content normal.         Judgment: Judgment normal.         Vitals:    10/12/23 0808   BP: 146/94   Pulse: 64   Resp: 18   Temp: 97.3 øF (36.3 øC)   SpO2: 98%   Weight: 85.3 kg (188 lb 0.8 oz)   Height: 177.8 cm (70\")   PainSc: 0-No pain                 ECOG: (0) Fully Active - Able to Carry On All Pre-disease Performance Without Restriction  Fall Risk Assessment was completed, and patient is at low risk for falls.  PHQ-9 Total Score:         The patient is  " experiencing fatigue. Fatigue score: 2    PT/OT Functional Screening: PT fx screen: No needs identified  Speech Functional Screening: Speech fx screen: No needs identified  Rehab to be ordered: Rehab to be ordered: No needs identified        Result Review :   The following data was reviewed by: Adriano Willard MD on 10/12/23:  Lab Results   Component Value Date    HGB 13.4 09/29/2023    HCT 40.5 09/29/2023    MCV 96.0 09/29/2023    PLT 76 (L) 09/29/2023    WBC 10.87 (H) 09/29/2023    NEUTROABS 2.85 09/29/2023    NEUTROABS 2.83 09/29/2023    LYMPHSABS 6.34 (H) 09/29/2023    MONOSABS 1.47 (H) 09/29/2023    EOSABS 0.15 09/29/2023    BASOSABS 0.04 09/29/2023     Lab Results   Component Value Date    GLUCOSE 108 (H) 08/16/2023    BUN 19 08/16/2023    CREATININE 1.10 09/26/2023     08/16/2023    K 4.4 08/16/2023     08/16/2023    CO2 27.2 08/16/2023    CALCIUM 8.6 08/16/2023    PROTEINTOT 6.7 08/16/2023    ALBUMIN 4.0 08/16/2023    BILITOT 0.6 08/16/2023    ALKPHOS 99 08/16/2023    AST 23 08/16/2023    ALT 17 08/16/2023     Labs personally reviewed and plts down to 76K from 101    PET scan personally reviewed and per my independent read without any hypermetabolic activity.     NM PET/CT Skull Base to Mid Thigh    Result Date: 10/10/2023   No areas of abnormal hypermetabolic uptake.  No evidence of hypermetabolic adenopathy.  No evidence of disease specific abnormal hypermetabolic activity.     CASTRO CAMPBELL MD       Electronically Signed and Approved By: CASTRO CAMPBELL MD on 10/10/2023 at 10:58             CT Chest With Contrast Diagnostic    Result Date: 9/26/2023    1. The lymphadenopathy in the jacinta mediastinum has increased in size.  This is suspicious for residual recurrent disease.  There are pathologically enlarged lymph nodes in the upper abdomen.  Whole body PET-CT is recommended for better evaluation. 2. Changes of right upper lobectomy.  Moderate emphysema.     CORY TORRES MD        Electronically Signed and Approved By: CORY TORRES MD on 9/26/2023 at 13:04                   Assessment and Plan    Diagnoses and all orders for this visit:    1. Thrombocytopenia (Primary)  -     CBC & Differential; Future    2. Malignant neoplasm of upper lobe of right lung      NSCLC  Initially treated with RUL lobectomy 4/2016. Reviewed CT scan with patient which demonstrates enlargement in several lymph nodes in mediastinum and right hilum as well as some upper abdominal lymph nodes. PET scan obtained 10/10/23 without any hypermetabolic activity. Repeat CT Chest in 1 year.     Thrombocytopenia  Plts up to 76K as of 9/29/2023, shun of 45K 7/2021. Could have been from plavix. He has been switched to aspirin from plavix so this may be helping.  Will continue to check plt count periodically. Repeat in 6 months. Counseled on if increased bleeding to let us know.         Patient Follow Up: 6 months with CBC  Patient was given instructions and counseling regarding his condition or for health maintenance advice. Please see specific information pulled into the AVS if appropriate.

## 2023-12-12 DIAGNOSIS — R39.12 BENIGN PROSTATIC HYPERPLASIA WITH WEAK URINARY STREAM: ICD-10-CM

## 2023-12-12 DIAGNOSIS — N40.1 BENIGN PROSTATIC HYPERPLASIA WITH WEAK URINARY STREAM: ICD-10-CM

## 2023-12-12 DIAGNOSIS — E78.2 MIXED HYPERLIPIDEMIA: ICD-10-CM

## 2023-12-12 RX ORDER — TAMSULOSIN HYDROCHLORIDE 0.4 MG/1
1 CAPSULE ORAL DAILY
Qty: 90 CAPSULE | Refills: 0 | Status: SHIPPED | OUTPATIENT
Start: 2023-12-12

## 2023-12-12 RX ORDER — ATORVASTATIN CALCIUM 40 MG/1
40 TABLET, FILM COATED ORAL NIGHTLY
Qty: 90 TABLET | Refills: 0 | Status: SHIPPED | OUTPATIENT
Start: 2023-12-12 | End: 2024-06-09

## 2023-12-26 ENCOUNTER — OFFICE VISIT (OUTPATIENT)
Dept: FAMILY MEDICINE CLINIC | Facility: CLINIC | Age: 73
End: 2023-12-26
Payer: MEDICARE

## 2023-12-26 VITALS
SYSTOLIC BLOOD PRESSURE: 124 MMHG | DIASTOLIC BLOOD PRESSURE: 72 MMHG | OXYGEN SATURATION: 98 % | HEART RATE: 68 BPM | HEIGHT: 70 IN | BODY MASS INDEX: 26.48 KG/M2 | WEIGHT: 185 LBS | TEMPERATURE: 98.1 F

## 2023-12-26 DIAGNOSIS — U07.1 COVID-19 VIRUS INFECTION: Primary | ICD-10-CM

## 2023-12-26 LAB
EXPIRATION DATE: ABNORMAL
FLUAV AG UPPER RESP QL IA.RAPID: NOT DETECTED
FLUBV AG UPPER RESP QL IA.RAPID: NOT DETECTED
INTERNAL CONTROL: ABNORMAL
Lab: ABNORMAL
SARS-COV-2 AG UPPER RESP QL IA.RAPID: DETECTED

## 2023-12-26 NOTE — PROGRESS NOTES
"Chief Complaint  URI (X 2 days.  Very congested in head and nasal), Cough (X 4 days), and Loss Of Taste (X 3 days)    Subjective      URI   Associated symptoms include coughing.   Cough      Kamron Sanchez is a 73 y.o. male who presents to [facility] with a past medical history of  Past Medical History:   Diagnosis Date    Abnormal blood chemistry 09/17/2014    Elevated RBC count    Arthritis 09/16/2014    Arthropathy, unspecified     multiple sites    Cancer     Cervicogenic headache 08/09/2019    Emphysema of lung 02/09/2016    Heart attack     Hepatitis C     Hyperlipemia     Hyperlipidemia 09/16/2014    Hypertension 09/16/2014    Impaired fasting glucose 09/17/2014    Lung cancer     Lung disease     Lung nodule seen on imaging study 02/09/2016    Myocardial infarction     Shortness of breath     Tobacco abuse 02/09/2016   NSCLC      Same day visit   He comes with c/c of cough, loss of taste and smell. Symptoms started 4 days ago.  Denies SOB, LE edema, chest pain.     Patient positive for COVID 19     Recommend conservative measure, self isolation and he qualifies for paxlovid.     Obtain a pulse oximeter to check saturation levels. Levels below 90% are indicated to visit the ED. Also, fever curve trending up, SOB, and worsening f his symptoms.       Patient prescribed Paxlovid.   Statin, Flomax and garlic discontinued. Patient aware. He refers he is not taking clopidogrel.     Patient should restart medication after paxlovid.     Follow with primary care.     Objective   Vital Signs:   Vitals:    12/26/23 0921   BP: 124/72   Pulse: 68   Temp: 98.1 °F (36.7 °C)   SpO2: 98%   Weight: 83.9 kg (185 lb)   Height: 177.8 cm (70\")     Body mass index is 26.54 kg/m².    Wt Readings from Last 3 Encounters:   12/26/23 83.9 kg (185 lb)   10/12/23 85.3 kg (188 lb 0.8 oz)   09/29/23 84.3 kg (185 lb 13.6 oz)     BP Readings from Last 3 Encounters:   12/26/23 124/72   10/12/23 146/94 "   09/29/23 135/74       Health Maintenance   Topic Date Due    ZOSTER VACCINE (1 of 2) Never done    COVID-19 Vaccine (4 - 2023-24 season) 09/01/2023    Pneumococcal Vaccine 65+ (2 - PCV) 01/06/2024 (Originally 1/6/2022)    INFLUENZA VACCINE  03/31/2024 (Originally 8/1/2023)    ANNUAL WELLNESS VISIT  02/06/2024    BMI FOLLOWUP  02/06/2024    LIPID PANEL  08/16/2024    COLORECTAL CANCER SCREENING  04/17/2026    TDAP/TD VACCINES (2 - Td or Tdap) 07/01/2028    HEPATITIS C SCREENING  Completed    AAA SCREEN (ONE-TIME)  Completed    Hepatitis B  Aged Out    LUNG CANCER SCREENING  Discontinued       Physical Exam  Vitals reviewed.   HENT:      Head: Normocephalic.      Mouth/Throat:      Mouth: Mucous membranes are moist.   Eyes:      Pupils: Pupils are equal, round, and reactive to light.   Cardiovascular:      Rate and Rhythm: Normal rate.   Abdominal:      General: Abdomen is flat.   Musculoskeletal:         General: Normal range of motion.      Cervical back: Normal range of motion.   Skin:     General: Skin is warm.      Capillary Refill: Capillary refill takes less than 2 seconds.   Neurological:      Mental Status: He is alert.            Result Review :  The following data was reviewed by: Solo Momin MD on 12/26/2023:             Assessment and Plan   Diagnoses and all orders for this visit:    1. COVID-19 virus infection (Primary)  -     POCT SARS-CoV-2 Antigen GALO + Flu  -     Nirmatrelvir & Ritonavir, 300mg/100mg, (PAXLOVID) 20 x 150 MG & 10 x 100MG tablet therapy pack tablet; Take 3 tablets by mouth 2 (Two) Times a Day for 5 days.  Dispense: 30 tablet; Refill: 0                  FOLLOW UP  No follow-ups on file.  Patient was given instructions and counseling regarding his condition or for health maintenance advice. Please see specific information pulled into the AVS if appropriate.       Solo Momin MD  12/26/23  10:04 EST    CURRENT & DISCONTINUED MEDICATIONS  Current  Outpatient Medications   Medication Instructions    albuterol sulfate  (90 Base) MCG/ACT inhaler 2 puffs, Inhalation, Every 4 Hours PRN    ascorbic acid (VITAMIN C) 1000 MG tablet Vitamin C 1,000 mg oral tablet take 1 tablet by oral route daily   Active    aspirin 81 mg, Oral, Daily    benzonatate (TESSALON) 100 MG capsule     cetirizine (zyrTEC) 10 MG tablet No dose, route, or frequency recorded.    Cetirizine HCl 10 mg, Oral, Every Night at Bedtime    cholecalciferol (VITAMIN D3) 2,000 Units, Oral, Daily    Cholecalciferol 50 mcg, Oral, Daily    cyclobenzaprine (FLEXERIL) 10 mg, Oral, 3 Times Daily PRN    Diclofenac Sodium (VOLTAREN) 4 g, Topical, 4 Times Daily PRN    fluorouracil (EFUDEX) 5 % cream fluorouracil 5 % topical cream apply a sufficient amount to cover the lesions in the affected area(s) by topical route 2 times per day   Active    fluticasone (FLONASE) 50 MCG/ACT nasal spray 2 sprays, Nasal, Daily, 2 sprays each nostril daily    gabapentin (NEURONTIN) 300 mg, Oral, 3 Times Daily    isosorbide mononitrate (IMDUR) 60 MG 24 hr tablet No dose, route, or frequency recorded.    ketoconazole (NIZORAL) 2 % shampoo     lidocaine (LIDODERM) 5 %     meclizine (ANTIVERT) 25 mg, Oral, 3 Times Daily PRN    methocarbamol (ROBAXIN) 500 MG tablet     metoprolol succinate XL (TOPROL-XL) 50 MG 24 hr tablet No dose, route, or frequency recorded.    MoviPrep 100 g reconstituted solution powder No dose, route, or frequency recorded.    Nirmatrelvir & Ritonavir, 300mg/100mg, (PAXLOVID) 20 x 150 MG & 10 x 100MG tablet therapy pack tablet 3 tablets, Oral, 2 Times Daily    nitroglycerin (NITROSTAT) 0.3 MG SL tablet     nitroglycerin (NITROSTAT) 0.4 mg, Sublingual    simethicone (Gas-X) 80 MG chewable tablet Take 2 tablets PO after completing movi prep and 2 tablets PO 4 hours prior to procedure.    Tiotropium Bromide Monohydrate (Spiriva Respimat) 1.25 MCG/ACT aerosol solution inhaler 2 puffs, Inhalation, Daily - RT     traMADol (ULTRAM) 50 MG tablet        Medications Discontinued During This Encounter   Medication Reason    GARLIC PO Discontinued by another clinician    atorvastatin (LIPITOR) 40 MG tablet Discontinued by another clinician    tamsulosin (FLOMAX) 0.4 MG capsule 24 hr capsule Discontinued by another clinician    clopidogrel (PLAVIX) 75 MG tablet *Therapy completed

## 2023-12-28 ENCOUNTER — TELEPHONE (OUTPATIENT)
Dept: SLEEP MEDICINE | Facility: HOSPITAL | Age: 73
End: 2023-12-28
Payer: MEDICARE

## 2023-12-28 NOTE — TELEPHONE ENCOUNTER
Patient had called to say he needed an updated order for supplies. I have reached out to Greta to obtain a copy for signature. Called patient back to inform him that we would need to schedule his annual appointment with his sleep provider in order to have the supply order signed as last appointment was June 2022. Left message for patient to call sleep center back at [phone] option 1 to schedule annual appointment.

## 2024-01-11 ENCOUNTER — OFFICE VISIT (OUTPATIENT)
Dept: SLEEP MEDICINE | Facility: HOSPITAL | Age: 74
End: 2024-01-11
Payer: MEDICARE

## 2024-01-11 VITALS — OXYGEN SATURATION: 98 % | HEIGHT: 71 IN | HEART RATE: 66 BPM | BODY MASS INDEX: 26.29 KG/M2 | WEIGHT: 187.8 LBS

## 2024-01-11 DIAGNOSIS — E66.3 OVERWEIGHT WITH BODY MASS INDEX (BMI) 25.0-29.9: ICD-10-CM

## 2024-01-11 DIAGNOSIS — G47.33 OSA (OBSTRUCTIVE SLEEP APNEA): Primary | ICD-10-CM

## 2024-01-11 PROCEDURE — 1159F MED LIST DOCD IN RCRD: CPT | Performed by: FAMILY MEDICINE

## 2024-01-11 PROCEDURE — G0463 HOSPITAL OUTPT CLINIC VISIT: HCPCS

## 2024-01-11 PROCEDURE — 1160F RVW MEDS BY RX/DR IN RCRD: CPT | Performed by: FAMILY MEDICINE

## 2024-01-11 PROCEDURE — 99213 OFFICE O/P EST LOW 20 MIN: CPT | Performed by: FAMILY MEDICINE

## 2024-01-11 NOTE — PROGRESS NOTES
"Follow Up [facility] Note     Chief Complaint:  DAYTON     Primary Care Physician: Garrett Harrison APRN    Interval History:   The patient is a 73 y.o. male  who was last seen in the sleep lab: 2022.  Presents today for follow-up of DAYTON.   testing showed RDI 15.9 AHI 10.6.  Patient is on auto CPAP 11-15.  No problems with mask machine hypersomnia nonrestorative sleep.  Fullface mask fits well however leaks air.    Downloaded PAP Data Reviewed For Compliance:  DME is [employer].  Downloads between 2023 - 2023.  Average usage is 6 hours 52 minutes.  Average AHI is 6.1.  Average auto CPAP pressure is 13    I have reviewed the above results and compared them with the patient's last downloads and reviewed with the patient.    Review of Systems:    A complete review of systems was done and all were negative with the exception of shortness of breath dry mouth    Social History:    Social History     Socioeconomic History    Marital status:    Tobacco Use    Smoking status: Former     Packs/day: 2.00     Years: 31.00     Additional pack years: 0.00     Total pack years: 62.00     Types: Cigarettes     Start date:      Quit date:      Years since quittin.0    Smokeless tobacco: Never   Vaping Use    Vaping Use: Never used   Substance and Sexual Activity    Alcohol use: Never    Drug use: Never    Sexual activity: Defer       Allergies:  Patient has no known allergies.     Medication Review:  Reviewed.      Vital Signs:    Vitals:    24 0900   Pulse: 66   SpO2: 98%   Weight: 85.2 kg (187 lb 12.8 oz)   Height: 180.3 cm (70.98\")     Body mass index is 26.2 kg/m².    Physical Exam:    Constitutional:  Well developed 73 y.o. male that appears in no apparent distress.  Awake & oriented times 3.  Normal mood with normal recent and remote memory and normal judgement.  Eyes:  Conjunctivae normal.  Oropharynx: Previously, moist mucous membranes.    Self-administered " [place] Sleepiness Scale test results: 1  0-5 Lower normal daytime sleepiness  6-10 Higher normal daytime sleepiness  11-12 Mild, 13-15 Moderate, & 16-24 Severe excessive daytime sleepiness    Impression:   1. DAYTON (obstructive sleep apnea)    2. Overweight with body mass index (BMI) 25.0-29.9        Obstructive sleep apnea adequately treated with auto CPAP 11-15. The patient appears to be at goal with good compliance and usage. The patient has no complaints of hypersomnolence.  Residual AHI likely secondary to significant mask leak; due for mask replacement.    Plan:  Good sleep hygiene measures should be maintained.  Weight loss would be beneficial in this patient who overweight by Body mass index is 26.2 kg/m²..      After evaluating the patient and assessing results available, the patient is benefiting from the treatment being provided.     The patient will continue auto CPAP.  After clinical evaluation and review of downloads, I recommend no changes to the patient's pressures.  A new prescription will be sent to the patient's DME.    Caution during activities that require prolonged concentration is strongly advised if sleepiness returns. Changing of PAP supplies regularly is important for effective use. Patient needs to change cushion on the mask or plugs on nasal pillows along with disposable filters once every month and change mask frame, tubing, headgear and Velcro straps every 6 months at the minimum.    I answered all of the patient's questions.  The patient will call for any problems and will follow up in 1 year.      Quinton Daugherty MD  Sleep Medicine  01/11/24  10:24 EST

## 2024-02-14 ENCOUNTER — OFFICE VISIT (OUTPATIENT)
Dept: FAMILY MEDICINE CLINIC | Facility: CLINIC | Age: 74
End: 2024-02-14
Payer: MEDICARE

## 2024-02-14 VITALS
WEIGHT: 193 LBS | SYSTOLIC BLOOD PRESSURE: 140 MMHG | HEIGHT: 71 IN | BODY MASS INDEX: 27.02 KG/M2 | TEMPERATURE: 96.9 F | HEART RATE: 61 BPM | OXYGEN SATURATION: 97 % | DIASTOLIC BLOOD PRESSURE: 82 MMHG

## 2024-02-14 DIAGNOSIS — M54.81 CERVICO-OCCIPITAL NEURALGIA: ICD-10-CM

## 2024-02-14 DIAGNOSIS — I10 PRIMARY HYPERTENSION: Primary | ICD-10-CM

## 2024-02-14 DIAGNOSIS — M50.30 DDD (DEGENERATIVE DISC DISEASE), CERVICAL: ICD-10-CM

## 2024-02-14 DIAGNOSIS — Z79.899 MEDICATION MANAGEMENT: ICD-10-CM

## 2024-02-14 DIAGNOSIS — E55.9 VITAMIN D DEFICIENCY: ICD-10-CM

## 2024-02-14 DIAGNOSIS — R73.01 IMPAIRED FASTING GLUCOSE: ICD-10-CM

## 2024-02-14 DIAGNOSIS — J43.9 PULMONARY EMPHYSEMA, UNSPECIFIED EMPHYSEMA TYPE: ICD-10-CM

## 2024-02-14 DIAGNOSIS — Z13.29 SCREENING FOR THYROID DISORDER: ICD-10-CM

## 2024-02-14 DIAGNOSIS — I25.10 CORONARY ARTERY DISEASE INVOLVING NATIVE HEART WITHOUT ANGINA PECTORIS, UNSPECIFIED VESSEL OR LESION TYPE: ICD-10-CM

## 2024-02-14 DIAGNOSIS — E78.2 MIXED HYPERLIPIDEMIA: ICD-10-CM

## 2024-02-14 LAB
25(OH)D3 SERPL-MCNC: 34.7 NG/ML (ref 30–100)
ALBUMIN SERPL-MCNC: 4.1 G/DL (ref 3.5–5.2)
ALBUMIN/GLOB SERPL: 1.7 G/DL
ALP SERPL-CCNC: 90 U/L (ref 39–117)
ALT SERPL W P-5'-P-CCNC: 24 U/L (ref 1–41)
AMPHET+METHAMPHET UR QL: NEGATIVE
AMPHETAMINE INTERNAL CONTROL: NORMAL
AMPHETAMINES UR QL: NEGATIVE
ANION GAP SERPL CALCULATED.3IONS-SCNC: 10 MMOL/L (ref 5–15)
AST SERPL-CCNC: 27 U/L (ref 1–40)
BARBITURATE INTERNAL CONTROL: NORMAL
BARBITURATES UR QL SCN: NEGATIVE
BENZODIAZ UR QL SCN: NEGATIVE
BENZODIAZEPINE INTERNAL CONTROL: NORMAL
BILIRUB SERPL-MCNC: 0.4 MG/DL (ref 0–1.2)
BILIRUB UR QL STRIP: NEGATIVE
BUN SERPL-MCNC: 17 MG/DL (ref 8–23)
BUN/CREAT SERPL: 16.2 (ref 7–25)
BUPRENORPHINE INTERNAL CONTROL: NORMAL
BUPRENORPHINE SERPL-MCNC: NEGATIVE NG/ML
CALCIUM SPEC-SCNC: 8.8 MG/DL (ref 8.6–10.5)
CANNABINOIDS SERPL QL: NEGATIVE
CHLORIDE SERPL-SCNC: 104 MMOL/L (ref 98–107)
CHOLEST SERPL-MCNC: 119 MG/DL (ref 0–200)
CLARITY UR: CLEAR
CO2 SERPL-SCNC: 25 MMOL/L (ref 22–29)
COCAINE INTERNAL CONTROL: NORMAL
COCAINE UR QL: NEGATIVE
COLOR UR: YELLOW
CREAT SERPL-MCNC: 1.05 MG/DL (ref 0.76–1.27)
DEPRECATED RDW RBC AUTO: 44.9 FL (ref 37–54)
EGFRCR SERPLBLD CKD-EPI 2021: 74.5 ML/MIN/1.73
EOSINOPHIL # BLD MANUAL: 0.34 10*3/MM3 (ref 0–0.4)
EOSINOPHIL NFR BLD MANUAL: 3.1 % (ref 0.3–6.2)
ERYTHROCYTE [DISTWIDTH] IN BLOOD BY AUTOMATED COUNT: 13 % (ref 12.3–15.4)
EXPIRATION DATE: NORMAL
GLOBULIN UR ELPH-MCNC: 2.4 GM/DL
GLUCOSE SERPL-MCNC: 133 MG/DL (ref 65–99)
GLUCOSE UR STRIP-MCNC: NEGATIVE MG/DL
HBA1C MFR BLD: 6 % (ref 4.8–5.6)
HCT VFR BLD AUTO: 39.2 % (ref 37.5–51)
HDLC SERPL-MCNC: 44 MG/DL (ref 40–60)
HGB BLD-MCNC: 13 G/DL (ref 13–17.7)
HGB UR QL STRIP.AUTO: NEGATIVE
HOLD SPECIMEN: NORMAL
KETONES UR QL STRIP: NEGATIVE
LDLC SERPL CALC-MCNC: 61 MG/DL (ref 0–100)
LDLC/HDLC SERPL: 1.4 {RATIO}
LEUKOCYTE ESTERASE UR QL STRIP.AUTO: NEGATIVE
LYMPHOCYTES # BLD MANUAL: 5.11 10*3/MM3 (ref 0.7–3.1)
LYMPHOCYTES NFR BLD MANUAL: 10.2 % (ref 5–12)
Lab: NORMAL
MCH RBC QN AUTO: 31.4 PG (ref 26.6–33)
MCHC RBC AUTO-ENTMCNC: 33.2 G/DL (ref 31.5–35.7)
MCV RBC AUTO: 94.7 FL (ref 79–97)
MDMA (ECSTASY) INTERNAL CONTROL: NORMAL
MDMA UR QL SCN: NEGATIVE
METHADONE INTERNAL CONTROL: NORMAL
METHADONE UR QL SCN: NEGATIVE
METHAMPHETAMINE INTERNAL CONTROL: NORMAL
MONOCYTES # BLD: 1.11 10*3/MM3 (ref 0.1–0.9)
MORPHINE INTERNAL CONTROL: NORMAL
MORPHINE/OPIATES SCREEN, URINE: NEGATIVE
NEUTROPHILS # BLD AUTO: 4.33 10*3/MM3 (ref 1.7–7)
NEUTROPHILS NFR BLD MANUAL: 39.8 % (ref 42.7–76)
NITRITE UR QL STRIP: NEGATIVE
OXYCODONE INTERNAL CONTROL: NORMAL
OXYCODONE UR QL SCN: NEGATIVE
PCP UR QL SCN: NEGATIVE
PH UR STRIP.AUTO: 6.5 [PH] (ref 5–8)
PHENCYCLIDINE INTERNAL CONTROL: NORMAL
PLAT MORPH BLD: NORMAL
PLATELET # BLD AUTO: 94 10*3/MM3 (ref 140–450)
PMV BLD AUTO: 13.2 FL (ref 6–12)
POTASSIUM SERPL-SCNC: 4.3 MMOL/L (ref 3.5–5.2)
PROT SERPL-MCNC: 6.5 G/DL (ref 6–8.5)
PROT UR QL STRIP: NEGATIVE
RBC # BLD AUTO: 4.14 10*6/MM3 (ref 4.14–5.8)
RBC MORPH BLD: NORMAL
SMUDGE CELLS BLD QL SMEAR: ABNORMAL
SODIUM SERPL-SCNC: 139 MMOL/L (ref 136–145)
SP GR UR STRIP: 1.01 (ref 1–1.03)
THC INTERNAL CONTROL: NORMAL
TRIGL SERPL-MCNC: 67 MG/DL (ref 0–150)
TSH SERPL DL<=0.05 MIU/L-ACNC: 2.7 UIU/ML (ref 0.27–4.2)
UROBILINOGEN UR QL STRIP: NORMAL
VARIANT LYMPHS NFR BLD MANUAL: 46.9 % (ref 19.6–45.3)
VLDLC SERPL-MCNC: 14 MG/DL (ref 5–40)
WBC NRBC COR # BLD AUTO: 10.89 10*3/MM3 (ref 3.4–10.8)

## 2024-02-14 PROCEDURE — 81003 URINALYSIS AUTO W/O SCOPE: CPT | Performed by: NURSE PRACTITIONER

## 2024-02-14 PROCEDURE — 85007 BL SMEAR W/DIFF WBC COUNT: CPT | Performed by: NURSE PRACTITIONER

## 2024-02-14 PROCEDURE — 83036 HEMOGLOBIN GLYCOSYLATED A1C: CPT | Performed by: NURSE PRACTITIONER

## 2024-02-14 PROCEDURE — 84443 ASSAY THYROID STIM HORMONE: CPT | Performed by: NURSE PRACTITIONER

## 2024-02-14 PROCEDURE — 82306 VITAMIN D 25 HYDROXY: CPT | Performed by: NURSE PRACTITIONER

## 2024-02-14 PROCEDURE — 85025 COMPLETE CBC W/AUTO DIFF WBC: CPT | Performed by: NURSE PRACTITIONER

## 2024-02-14 PROCEDURE — 80061 LIPID PANEL: CPT | Performed by: NURSE PRACTITIONER

## 2024-02-14 PROCEDURE — 80053 COMPREHEN METABOLIC PANEL: CPT | Performed by: NURSE PRACTITIONER

## 2024-02-14 RX ORDER — ALBUTEROL SULFATE 90 UG/1
2 AEROSOL, METERED RESPIRATORY (INHALATION) EVERY 4 HOURS PRN
Qty: 18 G | Refills: 2 | Status: SHIPPED | OUTPATIENT
Start: 2024-02-14

## 2024-02-14 RX ORDER — TAMSULOSIN HYDROCHLORIDE 0.4 MG/1
1 CAPSULE ORAL DAILY
Qty: 30 CAPSULE | Refills: 1 | Status: SHIPPED | OUTPATIENT
Start: 2024-02-14

## 2024-02-14 RX ORDER — TIOTROPIUM BROMIDE INHALATION SPRAY 1.56 UG/1
2 SPRAY, METERED RESPIRATORY (INHALATION)
Qty: 3 EACH | Refills: 1 | Status: SHIPPED | OUTPATIENT
Start: 2024-02-14

## 2024-02-14 RX ORDER — GABAPENTIN 300 MG/1
300 CAPSULE ORAL 3 TIMES DAILY
Qty: 270 CAPSULE | Refills: 1 | Status: SHIPPED | OUTPATIENT
Start: 2024-02-14

## 2024-02-14 RX ORDER — TAMSULOSIN HYDROCHLORIDE 0.4 MG/1
1 CAPSULE ORAL DAILY
COMMUNITY
End: 2024-02-14 | Stop reason: SDUPTHER

## 2024-02-14 RX ORDER — ATORVASTATIN CALCIUM 40 MG/1
40 TABLET, FILM COATED ORAL NIGHTLY
COMMUNITY
End: 2024-02-14 | Stop reason: SDUPTHER

## 2024-02-14 RX ORDER — ATORVASTATIN CALCIUM 40 MG/1
40 TABLET, FILM COATED ORAL NIGHTLY
Qty: 90 TABLET | Refills: 1 | Status: SHIPPED | OUTPATIENT
Start: 2024-02-14

## 2024-02-14 RX ORDER — MELATONIN
2000 DAILY
Qty: 180 TABLET | Refills: 0 | Status: SHIPPED | OUTPATIENT
Start: 2024-02-14

## 2024-04-12 ENCOUNTER — OFFICE VISIT (OUTPATIENT)
Dept: ONCOLOGY | Facility: HOSPITAL | Age: 74
End: 2024-04-12
Payer: MEDICARE

## 2024-04-12 ENCOUNTER — LAB (OUTPATIENT)
Dept: ONCOLOGY | Facility: HOSPITAL | Age: 74
End: 2024-04-12
Payer: MEDICARE

## 2024-04-12 VITALS
SYSTOLIC BLOOD PRESSURE: 137 MMHG | HEIGHT: 71 IN | RESPIRATION RATE: 18 BRPM | BODY MASS INDEX: 26.7 KG/M2 | DIASTOLIC BLOOD PRESSURE: 77 MMHG | HEART RATE: 62 BPM | OXYGEN SATURATION: 98 % | TEMPERATURE: 97.2 F | WEIGHT: 190.7 LBS

## 2024-04-12 DIAGNOSIS — D69.6 THROMBOCYTOPENIA: Primary | ICD-10-CM

## 2024-04-12 DIAGNOSIS — C34.11 MALIGNANT NEOPLASM OF UPPER LOBE OF RIGHT LUNG: ICD-10-CM

## 2024-04-12 DIAGNOSIS — D69.6 THROMBOCYTOPENIA: ICD-10-CM

## 2024-04-12 DIAGNOSIS — I25.10 CORONARY ARTERY DISEASE INVOLVING NATIVE HEART WITHOUT ANGINA PECTORIS, UNSPECIFIED VESSEL OR LESION TYPE: ICD-10-CM

## 2024-04-12 LAB
BASOPHILS # BLD AUTO: 0.02 10*3/MM3 (ref 0–0.2)
BASOPHILS NFR BLD AUTO: 0.2 % (ref 0–1.5)
DEPRECATED RDW RBC AUTO: 48.1 FL (ref 37–54)
EOSINOPHIL # BLD AUTO: 0.18 10*3/MM3 (ref 0–0.4)
EOSINOPHIL NFR BLD AUTO: 1.4 % (ref 0.3–6.2)
ERYTHROCYTE [DISTWIDTH] IN BLOOD BY AUTOMATED COUNT: 13.3 % (ref 12.3–15.4)
GIANT PLATELETS: NORMAL
HCT VFR BLD AUTO: 40.8 % (ref 37.5–51)
HGB BLD-MCNC: 13.5 G/DL (ref 13–17.7)
IMM GRANULOCYTES # BLD AUTO: 0.03 10*3/MM3 (ref 0–0.05)
IMM GRANULOCYTES NFR BLD AUTO: 0.2 % (ref 0–0.5)
LARGE PLATELETS: NORMAL
LYMPHOCYTES # BLD AUTO: 8.89 10*3/MM3 (ref 0.7–3.1)
LYMPHOCYTES NFR BLD AUTO: 69.3 % (ref 19.6–45.3)
MCH RBC QN AUTO: 31.9 PG (ref 26.6–33)
MCHC RBC AUTO-ENTMCNC: 33.1 G/DL (ref 31.5–35.7)
MCV RBC AUTO: 96.5 FL (ref 79–97)
MONOCYTES # BLD AUTO: 0.89 10*3/MM3 (ref 0.1–0.9)
MONOCYTES NFR BLD AUTO: 6.9 % (ref 5–12)
NEUTROPHILS NFR BLD AUTO: 2.81 10*3/MM3 (ref 1.7–7)
NEUTROPHILS NFR BLD AUTO: 22 % (ref 42.7–76)
PLATELET # BLD AUTO: 73 10*3/MM3 (ref 140–450)
PMV BLD AUTO: 13.1 FL (ref 6–12)
RBC # BLD AUTO: 4.23 10*6/MM3 (ref 4.14–5.8)
RBC MORPH BLD: NORMAL
SMALL PLATELETS BLD QL SMEAR: NORMAL
WBC MORPH BLD: NORMAL
WBC NRBC COR # BLD AUTO: 12.82 10*3/MM3 (ref 3.4–10.8)

## 2024-04-12 PROCEDURE — 85007 BL SMEAR W/DIFF WBC COUNT: CPT

## 2024-04-12 PROCEDURE — G0463 HOSPITAL OUTPT CLINIC VISIT: HCPCS | Performed by: INTERNAL MEDICINE

## 2024-04-12 PROCEDURE — 85025 COMPLETE CBC W/AUTO DIFF WBC: CPT

## 2024-04-12 PROCEDURE — 36415 COLL VENOUS BLD VENIPUNCTURE: CPT

## 2024-04-12 NOTE — PROGRESS NOTES
Chief Complaint  Malignant neoplasm of lung, unspecified laterality, unspeci      Colasanti, Chrysalis An*  Colasanti, Chrysalis Dianne, APRN      Subjective          Kamron Sanchez presents to [facility] for  NSCLC    Lung Cancer  Pertinent negatives include no arthralgias, chest pain, coughing, diaphoresis, fatigue, fever, joint swelling, myalgias, numbness, rash or sore throat.      Mr. Kamron Sanchez presents for 1 year follow up for RUL NSCLC in April of 2016. He is status post RUL lobectomy with invasive adenocarcinoma with Dr. Heller from  2016.     He is followed yearly for CT scans. He also has has thrombocytocytopenia. He has been changed to baby aspirin from plavix. Takes this 3 times per week per instruction of his cardiologist due to easy bruising. Continues to have easy bruising. As of 4/12/24 plts at 73K. Low of 45k 7/2021. Doing well today. No acute complaints. Reports he has gained more weight than he is used to. No bleeding. No fevers, chills, infections. No chest pain or abdominal pain.       Cancer Staging  No matching staging information was found for the patient.     Treatment intent: curative    Oncology/Hematology History   Lung cancer   4/18/2016 Cancer Staged    Staging form: Lung, AJCC 8th Edition  - Pathologic stage from 4/18/2016: Stage IB (pT2a, pN0, cM0) - Signed by Adriano Willard MD on 9/29/2023 7/6/2021 Initial Diagnosis    Lung cancer       1) RUL NSCLC: -Right upper lobe lobectomy 4/6/16:  Staging: pT2aN0. Path:     Invasive adenocarcinoma, grade 3, 2 cm in size. Visceral pleura invasion with     lymphvascular invasion. Margins neg, vascular neg, neg parenchymal. 0 of 15 LN's    negative in [place] with Dr. Heller.             Treatment history:       -1/26/16: Patient presented with right shoulder pain and this included a chest     x-ray on 01/26 which showed an ill defined 3 cm opacity in the right lung base.           2/8/16:   Chest CT showed a 1.3 cm spiculated pleural based nodule anteriorly in     the right upper lobe suspicious for malignancy.  Also noted on the CT scan was     adenopathy adjacent to the GE junction measuring 2 cm and PET scan was     recommended.        2/15/16: PET scan showed hypermetabolic uptake in the right upper lobe pulmonary    nodule with a maximum SUV of 4.6.  There was a small focus of activity in the jesus    bcarinal region that was not significantly above the mediastinal blood pool     activity and was felt to be unlikely to represent metastatic disease.  The area     of suspected adenopathy that was seen near the GE junction on CT was not     hypermetabolic on PET.  There was a sclerotic lesion noted in the right iliac     bone which was also not hypermetabolic and was felt unlikely to represent met    astatic lesion.  The patient was referred to oncology for further     recommendations. At the time of his initial visit, he was referred for biopsy of    the nodule.  He was complaining of some periodic headaches and MRI brain was     ordered.  There were no findings to suggest intracranial metastasis.  He     underwent biopsy of the RUL lesion on 3/2.  This was positive for adenocarcinoma    and also stained positive for CDX2, raising concern for GI primary.  He was     referred for upper and lower endoscopy and underwent these on 3/10 under the     care of Dr. España.  Pathology from a stomach biopsy showed chronic gastritis     and staining was positive for H pylori.  No evidence of GI malignancy was found     on EGD or colonoscopy.  He was referred to  for determination of his surgical     candidacy and underwent surgery on 4/6 with Dr. Macarena Heller.      -4/6/16:  RUL lobectomy under the care of Dr. Heller. Path: RUL lobectomy -    invasive adenocarcinoma, grade 3 (pT2aN0). -tumor size 2cm -histologic type:     adenocarcinoma -INVASION OF VISCERAL PLEURA. LYMPHOVASCULAR INVASION. Margins: -     bronchial: negative -vascular: negative -parenchymal: negative -distance to     closest margin: 1.8cm (bronchial). Ratio of positive/total nodes: 0/15.       -4/6/16.Adjuvant chemotherapy was recommended due to high risk features.      Declined chemotherapy.       -12/9/16: PET/CT showed postsurgical changes status post interval right upper     lobectomy with removal of right upper lobe cancer. No evidence of local     recurrence or thoracic lymphadenopathy. No PET/CT evidence of metastasis within     neck, abdomen, or pelvis.      -July 2017.  Chest CT showed a decrease in size of the mediastinal and hilar     lymph nodes from 12/09/2016.  No convincing evidence of metastatic disease      -12/27/17: CT chest at the [facility] showed no evidence of cancer.  12    mm subcarinal lymph node was 15 mm previously.      -8/1/2019: No evidence of local recurrence or metastasis within the CAP.    8/2020: CT chest: No evidence of recurrence or metastasis.     8/2021: CT chest: no evidence of recurrence or metastasis.     Review of Systems   Constitutional:  Positive for unexpected weight gain. Negative for appetite change, diaphoresis, fatigue, fever and unexpected weight loss.   HENT:  Negative for hearing loss, sore throat and voice change.    Eyes:  Negative for blurred vision, double vision, pain, redness and visual disturbance.   Respiratory:  Negative for cough, shortness of breath and wheezing.    Cardiovascular:  Negative for chest pain, palpitations and leg swelling.   Endocrine: Negative for cold intolerance, heat intolerance, polydipsia and polyuria.   Genitourinary:  Negative for decreased urine volume, difficulty urinating, frequency and urinary incontinence.   Musculoskeletal:  Negative for arthralgias, back pain, joint swelling and myalgias.   Skin:  Negative for color change, rash, skin lesions and wound.   Neurological:  Negative for dizziness, seizures, numbness and headache.   Hematological:   Negative for adenopathy. Does not bruise/bleed easily.   Psychiatric/Behavioral:  Negative for depressed mood. The patient is not nervous/anxious.    All other systems reviewed and are negative.      Current Outpatient Medications on File Prior to Visit   Medication Sig Dispense Refill    albuterol sulfate  (90 Base) MCG/ACT inhaler Inhale 2 puffs Every 4 (Four) Hours As Needed for Wheezing. 18 g 2    ascorbic acid (VITAMIN C) 1000 MG tablet Vitamin C 1,000 mg oral tablet take 1 tablet by oral route daily   Active      aspirin 81 MG EC tablet Take 1 tablet by mouth Daily.      atorvastatin (LIPITOR) 40 MG tablet Take 1 tablet by mouth Every Night. 90 tablet 1    Cetirizine HCl 10 MG capsule Take 10 mg by mouth every night at bedtime. 90 capsule 1    cholecalciferol (VITAMIN D3) 25 MCG (1000 UT) tablet Take 2 tablets by mouth Daily. 180 tablet 0    Cholecalciferol 50 MCG (2000 UT) tablet Take 1 tablet by mouth Daily. 90 each 1    Diclofenac Sodium (VOLTAREN) 1 % gel gel Apply 4 g topically to the appropriate area as directed 4 (Four) Times a Day As Needed (joint). 350 g 1    fluorouracil (EFUDEX) 5 % cream fluorouracil 5 % topical cream apply a sufficient amount to cover the lesions in the affected area(s) by topical route 2 times per day   Active      fluticasone (FLONASE) 50 MCG/ACT nasal spray 2 sprays into the nostril(s) as directed by provider Daily. 2 sprays each nostril daily 3 mL 1    gabapentin (NEURONTIN) 300 MG capsule Take 1 capsule by mouth 3 (Three) Times a Day. 270 capsule 1    isosorbide mononitrate (IMDUR) 60 MG 24 hr tablet       lidocaine (LIDODERM) 5 %       meclizine (ANTIVERT) 25 MG tablet Take 1 tablet by mouth 3 (Three) Times a Day As Needed for Dizziness. 90 tablet 5    metoprolol succinate XL (TOPROL-XL) 50 MG 24 hr tablet       MoviPrep 100 g reconstituted solution powder       nitroglycerin (NITROSTAT) 0.3 MG SL tablet       simethicone (Gas-X) 80 MG chewable tablet Take 2 tablets  PO after completing movi prep and 2 tablets PO 4 hours prior to procedure. 4 tablet 0    tamsulosin (FLOMAX) 0.4 MG capsule 24 hr capsule Take 1 capsule by mouth Daily. 30 capsule 1    Tiotropium Bromide Monohydrate (Spiriva Respimat) 1.25 MCG/ACT aerosol solution inhaler Inhale 2 puffs Daily. 3 each 1     No current facility-administered medications on file prior to visit.       No Known Allergies  Past Medical History:   Diagnosis Date    Abnormal blood chemistry 2014    Elevated RBC count    Arthritis 2014    Arthropathy, unspecified     multiple sites    Cancer     Cervicogenic headache 2019    Emphysema of lung 2016    Heart attack     Hepatitis C     Hyperlipemia     Hyperlipidemia 2014    Hypertension 2014    Impaired fasting glucose 2014    Lung cancer     Lung disease     Lung nodule seen on imaging study 2016    Myocardial infarction     Shortness of breath     Tobacco abuse 2016     Past Surgical History:   Procedure Laterality Date    CARDIAC SURGERY      CARDIAC VALVE SURGERY      COLONOSCOPY      COLONOSCOPY N/A 2023    Procedure: COLONOSCOPY WITH POLYPECTOMIES, HOT SNARE, CLIP APPLICATION X2;  Surgeon: Tamir España MD;  [facility]: [facility] ENDOSCOPY;  Service: General;  Laterality: N/A;  COLON POLYPS    CORONARY STENT PLACEMENT      FINGER SURGERY      HAND SURGERY      HERNIA REPAIR      SKIN CANCER EXCISION      TONSILLECTOMY       Social History     Socioeconomic History    Marital status:    Tobacco Use    Smoking status: Former     Current packs/day: 0.00     Average packs/day: 2.0 packs/day for 54.0 years (108.0 ttl pk-yrs)     Types: Cigarettes     Start date:      Quit date:      Years since quittin.2     Passive exposure: Never    Smokeless tobacco: Never   Vaping Use    Vaping status: Never Used   Substance and Sexual Activity    Alcohol use: Never    Drug use: Never    Sexual activity: Defer     Family History  "  Problem Relation Age of Onset    Cancer Mother     Diabetes Father     Arthritis Father     Cancer Father     Stroke Other     Breast cancer Other      Immunization History   Administered Date(s) Administered    COVID-19 (MODERNA) 1st,2nd,3rd Dose Monovalent 03/03/2021, 03/31/2021    COVID-19 (MODERNA) Monovalent Original Booster 11/02/2021    Influenza, Unspecified 01/06/2021    Pneumococcal Polysaccharide (PPSV23) 01/06/2021    Tdap 07/01/2018       Objective   Physical Exam  Constitutional:       Appearance: Normal appearance.   HENT:      Head: Normocephalic and atraumatic.      Nose: Nose normal.      Mouth/Throat:      Mouth: Mucous membranes are moist.   Eyes:      Conjunctiva/sclera: Conjunctivae normal.   Pulmonary:      Effort: Pulmonary effort is normal.   Neurological:      General: No focal deficit present.      Mental Status: He is alert. Mental status is at baseline.   Psychiatric:         Mood and Affect: Mood normal.         Behavior: Behavior normal.         Thought Content: Thought content normal.         Judgment: Judgment normal.         Vitals:    04/12/24 0812   BP: 137/77   Pulse: 62   Resp: 18   Temp: 97.2 °F (36.2 °C)   SpO2: 98%   Weight: 86.5 kg (190 lb 11.2 oz)   Height: 180.3 cm (70.98\")   PainSc: 0-No pain                   ECOG: (0) Fully Active - Able to Carry On All Pre-disease Performance Without Restriction  Fall Risk Assessment was completed, and patient is at low risk for falls.  PHQ-9 Total Score:         The patient is  experiencing fatigue. Fatigue score: 2    PT/OT Functional Screening: PT fx screen: No needs identified  Speech Functional Screening: Speech fx screen: No needs identified  Rehab to be ordered: Rehab to be ordered: No needs identified        Result Review :   The following data was reviewed by: Adriano Willard MD on 04/12/24:  Lab Results   Component Value Date    HGB 13.5 04/12/2024    HCT 40.8 04/12/2024    MCV 96.5 04/12/2024    PLT 73 (L) 04/12/2024 "    WBC 12.82 (H) 04/12/2024    NEUTROABS 2.81 04/12/2024    LYMPHSABS 8.89 (H) 04/12/2024    MONOSABS 0.89 04/12/2024    EOSABS 0.18 04/12/2024    BASOSABS 0.02 04/12/2024     Lab Results   Component Value Date    GLUCOSE 133 (H) 02/14/2024    BUN 17 02/14/2024    CREATININE 1.05 02/14/2024     02/14/2024    K 4.3 02/14/2024     02/14/2024    CO2 25.0 02/14/2024    CALCIUM 8.8 02/14/2024    PROTEINTOT 6.5 02/14/2024    ALBUMIN 4.1 02/14/2024    BILITOT 0.4 02/14/2024    ALKPHOS 90 02/14/2024    AST 27 02/14/2024    ALT 24 02/14/2024     Labs personally reviewed and plts at 73K.    PCP note personally reviewed        No radiology results for the last 30 days.         Assessment and Plan    Diagnoses and all orders for this visit:    1. Thrombocytopenia (Primary)  -     CBC & Differential; Future    2. Malignant neoplasm of upper lobe of right lung  -     CBC & Differential; Future  -     CT Chest Without Contrast; Future        NSCLC  Initially treated with RUL lobectomy 4/2016. Reviewed CT scan with patient which demonstrates enlargement in several lymph nodes in mediastinum and right hilum as well as some upper abdominal lymph nodes. PET scan obtained 10/10/23 without any hypermetabolic activity. Repeat CT Chest without contrast due 10/2024.      Thrombocytopenia  Plts 73K as of 4/12/24, shun of 45K 7/2021. Could have been from plavix. He has been switched to aspirin from plavix so this may be helping.  Will continue to check plt count periodically. Repeat in 6 months. Counseled on if increased bleeding to let us know.     CAD  Following with cardiology. On ASA 3 times per week. Asymptomatic today.         Patient Follow Up: 6 months with CBC and CT chest  Patient was given instructions and counseling regarding his condition or for health maintenance advice. Please see specific information pulled into the AVS if appropriate.

## 2024-06-24 RX ORDER — MECLIZINE HYDROCHLORIDE 25 MG/1
25 TABLET ORAL 3 TIMES DAILY PRN
Qty: 90 TABLET | Refills: 5 | Status: SHIPPED | OUTPATIENT
Start: 2024-06-24

## 2024-06-24 NOTE — TELEPHONE ENCOUNTER
PATIENT CAME IN AND IS ASKING FOR THIS REFILL, AND WOULD LIKE TO HAVE A 90 SUPPLY SENT TO PHARMACY ON [address] [place].

## 2024-07-25 ENCOUNTER — OFFICE VISIT (OUTPATIENT)
Dept: FAMILY MEDICINE CLINIC | Facility: CLINIC | Age: 74
End: 2024-07-25
Payer: MEDICARE

## 2024-07-25 VITALS
DIASTOLIC BLOOD PRESSURE: 70 MMHG | TEMPERATURE: 98.8 F | BODY MASS INDEX: 27.48 KG/M2 | HEIGHT: 71 IN | SYSTOLIC BLOOD PRESSURE: 121 MMHG | HEART RATE: 65 BPM | OXYGEN SATURATION: 98 % | WEIGHT: 196.3 LBS

## 2024-07-25 DIAGNOSIS — H53.8 BLURRY VISION: Primary | ICD-10-CM

## 2024-07-25 PROCEDURE — 3078F DIAST BP <80 MM HG: CPT | Performed by: STUDENT IN AN ORGANIZED HEALTH CARE EDUCATION/TRAINING PROGRAM

## 2024-07-25 PROCEDURE — 1126F AMNT PAIN NOTED NONE PRSNT: CPT | Performed by: STUDENT IN AN ORGANIZED HEALTH CARE EDUCATION/TRAINING PROGRAM

## 2024-07-25 PROCEDURE — 3074F SYST BP LT 130 MM HG: CPT | Performed by: STUDENT IN AN ORGANIZED HEALTH CARE EDUCATION/TRAINING PROGRAM

## 2024-07-25 PROCEDURE — 99214 OFFICE O/P EST MOD 30 MIN: CPT | Performed by: STUDENT IN AN ORGANIZED HEALTH CARE EDUCATION/TRAINING PROGRAM

## 2024-07-25 NOTE — PROGRESS NOTES
"Chief Complaint  Eye Problem (LEFT EYE - BLURRY about a week was cutting grass and now hurting and wakes up with stuff in it )    Subjective      Kamron Sanchez is a 73 y.o. male who presents to [facility]     Blurry left eye. Possible foreign body. But ruleout other differentials as glaucoma of brain occupying lesions. Will send him urgent to ophthalmology/ED. Follow with PCP.     Objective   Vital Signs:   Vitals:    07/25/24 1328   BP: 121/70   Pulse: 65   Temp: 98.8 °F (37.1 °C)   TempSrc: Temporal   SpO2: 98%   Weight: 89 kg (196 lb 4.8 oz)   Height: 180.3 cm (70.98\")     Body mass index is 27.39 kg/m².    Wt Readings from Last 3 Encounters:   07/25/24 89 kg (196 lb 4.8 oz)   04/12/24 86.5 kg (190 lb 11.2 oz)   02/14/24 87.5 kg (193 lb)     BP Readings from Last 3 Encounters:   07/25/24 121/70   04/12/24 137/77   02/14/24 140/82       Health Maintenance   Topic Date Due    ANNUAL WELLNESS VISIT  02/06/2024    COVID-19 Vaccine (4 - 2023-24 season) 10/14/2024 (Originally 9/1/2023)    ZOSTER VACCINE (1 of 2) 02/13/2025 (Originally 8/2/1999)    Pneumococcal Vaccine 65+ (2 of 2 - PCV) 07/25/2025 (Originally 1/6/2022)    INFLUENZA VACCINE  08/01/2024    LIPID PANEL  02/14/2025    BMI FOLLOWUP  02/14/2025    COLORECTAL CANCER SCREENING  04/17/2026    TDAP/TD VACCINES (2 - Td or Tdap) 07/01/2028    HEPATITIS C SCREENING  Completed    AAA SCREEN (ONE-TIME)  Completed    Hepatitis B  Aged Out    LUNG CANCER SCREENING  Discontinued       Physical Exam  Vitals reviewed.   Constitutional:       Comments: Chemosis.    HENT:      Head: Normocephalic.      Mouth/Throat:      Mouth: Mucous membranes are moist.   Eyes:      Pupils: Pupils are equal, round, and reactive to light.   Cardiovascular:      Rate and Rhythm: Normal rate.   Abdominal:      General: Abdomen is flat.   Musculoskeletal:         General: Normal range of motion.      Cervical back: Normal range of motion.   Skin:     General: " Skin is warm.      Capillary Refill: Capillary refill takes less than 2 seconds.   Neurological:      Mental Status: He is alert.       Assessment & Plan  Blurry vision  Urgent referral ophthalmology.     Orders Placed This Encounter   Procedures    Ambulatory Referral to Ophthalmology                     I spent 16 minutes caring for Kamron on this date of service. This time includes time spent by me in the following activities:preparing for the visit, reviewing tests, obtaining and/or reviewing a separately obtained history, performing a medically appropriate examination and/or evaluation , counseling and educating the patient/family/caregiver, ordering medications, tests, or procedures, referring and communicating with other health care professionals , documenting information in the medical record, independently interpreting results and communicating that information with the patient/family/caregiver, and care coordination  FOLLOW UP  No follow-ups on file.  Patient was given instructions and counseling regarding his condition or for health maintenance advice. Please see specific information pulled into the AVS if appropriate.       Solo Momin MD  07/25/24  13:51 EDT    CURRENT & DISCONTINUED MEDICATIONS  Current Outpatient Medications   Medication Instructions    albuterol sulfate  (90 Base) MCG/ACT inhaler 2 puffs, Inhalation, Every 4 Hours PRN    ascorbic acid (VITAMIN C) 1000 MG tablet Vitamin C 1,000 mg oral tablet take 1 tablet by oral route daily   Active    aspirin 81 mg, Oral, Daily    atorvastatin (LIPITOR) 40 mg, Oral, Nightly    Cetirizine HCl 10 mg, Oral, Every Night at Bedtime    cholecalciferol (VITAMIN D3) 2,000 Units, Oral, Daily    Cholecalciferol 50 mcg, Oral, Daily    Diclofenac Sodium (VOLTAREN) 4 g, Topical, 4 Times Daily PRN    fluorouracil (EFUDEX) 5 % cream     fluticasone (FLONASE) 50 MCG/ACT nasal spray 2 sprays, Nasal, Daily, 2 sprays each nostril daily     gabapentin (NEURONTIN) 300 mg, Oral, 3 Times Daily    isosorbide mononitrate (IMDUR) 60 MG 24 hr tablet No dose, route, or frequency recorded.    lidocaine (LIDODERM) 5 %     meclizine (ANTIVERT) 25 mg, Oral, 3 Times Daily PRN    metoprolol succinate XL (TOPROL-XL) 50 MG 24 hr tablet No dose, route, or frequency recorded.    MoviPrep 100 g reconstituted solution powder No dose, route, or frequency recorded.    nitroglycerin (NITROSTAT) 0.3 MG SL tablet     simethicone (Gas-X) 80 MG chewable tablet Take 2 tablets PO after completing movi prep and 2 tablets PO 4 hours prior to procedure.    tamsulosin (FLOMAX) 0.4 mg, Oral, Daily    Tiotropium Bromide Monohydrate (Spiriva Respimat) 1.25 MCG/ACT aerosol solution inhaler 2 puffs, Inhalation, Daily - RT       There are no discontinued medications.

## 2024-08-09 NOTE — PROGRESS NOTES
Subjective   The ABCs of the Annual Wellness Visit  Medicare Wellness Visit      Kamron Sanchez is a 74 y.o. patient who presents for a Medicare Wellness Visit.    The following portions of the patient's history were reviewed and   updated as appropriate: allergies, current medications, past family history, past medical history, past social history, past surgical history, and problem list.    Compared to one year ago, the patient's physical   health is the same.  Compared to one year ago, the patient's mental   health is the same.    Recent Hospitalizations:  He was not admitted to the hospital during the last year.     Current Medical Providers:  Patient Care Team:  Garrett Harrison APRN as PCP - General (Family Medicine)  Jie Churchill APRN as Nurse Practitioner (Nurse Practitioner)    Outpatient Medications Prior to Visit   Medication Sig Dispense Refill    ascorbic acid (VITAMIN C) 1000 MG tablet Vitamin C 1,000 mg oral tablet take 1 tablet by oral route daily   Active      aspirin 81 MG EC tablet Take 1 tablet by mouth Daily.      Diclofenac Sodium (VOLTAREN) 1 % gel gel Apply 4 g topically to the appropriate area as directed 4 (Four) Times a Day As Needed (joint). 350 g 1    fluorouracil (EFUDEX) 5 % cream       isosorbide mononitrate (IMDUR) 60 MG 24 hr tablet       lidocaine (LIDODERM) 5 %       metoprolol succinate XL (TOPROL-XL) 50 MG 24 hr tablet       MoviPrep 100 g reconstituted solution powder       nitroglycerin (NITROSTAT) 0.3 MG SL tablet       simethicone (Gas-X) 80 MG chewable tablet Take 2 tablets PO after completing movi prep and 2 tablets PO 4 hours prior to procedure. 4 tablet 0    albuterol sulfate  (90 Base) MCG/ACT inhaler Inhale 2 puffs Every 4 (Four) Hours As Needed for Wheezing. 18 g 2    atorvastatin (LIPITOR) 40 MG tablet Take 1 tablet by mouth Every Night. 90 tablet 1    Cetirizine HCl 10 MG capsule Take 10 mg by mouth every night at bedtime. 90 capsule 1     cholecalciferol (VITAMIN D3) 25 MCG (1000 UT) tablet Take 2 tablets by mouth Daily. 180 tablet 0    Cholecalciferol 50 MCG (2000 UT) tablet Take 1 tablet by mouth Daily. 90 each 1    fluticasone (FLONASE) 50 MCG/ACT nasal spray 2 sprays into the nostril(s) as directed by provider Daily. 2 sprays each nostril daily 3 mL 1    gabapentin (NEURONTIN) 300 MG capsule Take 1 capsule by mouth 3 (Three) Times a Day. 270 capsule 1    meclizine (ANTIVERT) 25 MG tablet Take 1 tablet by mouth 3 (Three) Times a Day As Needed for Dizziness. 90 tablet 5    tamsulosin (FLOMAX) 0.4 MG capsule 24 hr capsule Take 1 capsule by mouth Daily. 30 capsule 1    Tiotropium Bromide Monohydrate (Spiriva Respimat) 1.25 MCG/ACT aerosol solution inhaler Inhale 2 puffs Daily. 3 each 1     No facility-administered medications prior to visit.     No opioid medication identified on active medication list. I have reviewed chart for other potential  high risk medication/s and harmful drug interactions in the elderly.      Aspirin is on active medication list. Aspirin use is indicated based on review of current medical condition/s. Pros and cons of this therapy have been discussed today. Benefits of this medication outweigh potential harm.  Patient has been encouraged to continue taking this medication.  .      Patient Active Problem List   Diagnosis    Cancer    Cervical spondylosis without myelopathy    Cervicogenic headache    Cervico-occipital neuralgia    DDD (degenerative disc disease), cervical    Emphysema of lung    Hepatitis    Hepatitis C    Hyperlipidemia    Hypertension    Impaired fasting glucose    Lung cancer    Lung disease    Lung mass    Heart attack    Shortness of breath    Tobacco use disorder    Seasonal allergies    CAD (coronary artery disease)    Medication management    Primary insomnia    Benign prostatic hyperplasia with weak urinary stream    Trigger index finger of left hand    Vertigo    Chest pain    Dyslipidemia     "Ischemic cardiomyopathy    Presence of coronary angioplasty implant and graft    Thrombocytopenia    Abnormal stool test    Vitamin D deficiency     Advance Care Planning Advance Directive is on file.           Objective   Vitals:    24 0708   BP: 137/79   Pulse: 63   Temp: 97.7 °F (36.5 °C)   SpO2: 97%   Weight: 89.4 kg (197 lb)   Height: 180.3 cm (70.98\")       Estimated body mass index is 27.49 kg/m² as calculated from the following:    Height as of this encounter: 180.3 cm (70.98\").    Weight as of this encounter: 89.4 kg (197 lb).            Does the patient have evidence of cognitive impairment? No                                                                                                Health  Risk Assessment    Smoking Status:  Social History     Tobacco Use   Smoking Status Former    Current packs/day: 0.00    Average packs/day: 2.0 packs/day for 54.0 years (108.0 ttl pk-yrs)    Types: Cigarettes    Start date:     Quit date:     Years since quittin.6    Passive exposure: Never   Smokeless Tobacco Never     Alcohol Consumption:  Social History     Substance and Sexual Activity   Alcohol Use Never       Fall Risk Screen  STEADI Fall Risk Assessment was completed, and patient is at LOW risk for falls.Assessment completed on:2024    Depression Screenin/13/2024     7:02 AM   PHQ-2/PHQ-9 Depression Screening   Little Interest or Pleasure in Doing Things 0-->not at all   Feeling Down, Depressed or Hopeless 0-->not at all   PHQ-9: Brief Depression Severity Measure Score 0     Health Habits and Functional and Cognitive Screenin/13/2024     7:01 AM   Functional & Cognitive Status   Do you have difficulty preparing food and eating? No   Do you have difficulty bathing yourself, getting dressed or grooming yourself? No   Do you have difficulty using the toilet? No   Do you have difficulty moving around from place to place? No   Do you have trouble with steps or getting out " of a bed or a chair? No   Current Diet Well Balanced Diet   Dental Exam Up to date   Eye Exam Up to date   Current Exercises Include No Regular Exercise   Do you need help using the phone?  No   Are you deaf or do you have serious difficulty hearing?  No   Do you need help to go to places out of walking distance? No   Do you need help shopping? No   Do you need help preparing meals?  No   Do you need help with housework?  No   Do you need help with laundry? No   Do you need help taking your medications? No   Do you need help managing money? No   Do you ever drive or ride in a car without wearing a seat belt? No   Have you felt unusual stress, anger or loneliness in the last month? No   Who do you live with? Spouse   If you need help, do you have trouble finding someone available to you? No   Have you been bothered in the last four weeks by sexual problems? No   Do you have difficulty concentrating, remembering or making decisions? No           Age-appropriate Screening Schedule:  Refer to the list below for future screening recommendations based on patient's age, sex and/or medical conditions. Orders for these recommended tests are listed in the plan section. The patient has been provided with a written plan.    Health Maintenance List  Health Maintenance   Topic Date Due    INFLUENZA VACCINE  08/01/2024    COVID-19 Vaccine (4 - 2023-24 season) 10/14/2024 (Originally 9/1/2023)    RSV Vaccine - Adults (1 - 1-dose 60+ series) 02/13/2025 (Originally 8/2/2009)    ZOSTER VACCINE (1 of 2) 02/13/2025 (Originally 8/2/1999)    Pneumococcal Vaccine 65+ (2 of 2 - PCV) 07/25/2025 (Originally 1/6/2022)    LIPID PANEL  02/14/2025    BMI FOLLOWUP  02/14/2025    ANNUAL WELLNESS VISIT  08/13/2025    COLORECTAL CANCER SCREENING  04/17/2026    TDAP/TD VACCINES (2 - Td or Tdap) 07/01/2028    HEPATITIS C SCREENING  Completed    AAA SCREEN (ONE-TIME)  Completed    Hepatitis B  Aged Out    LUNG CANCER SCREENING  Discontinued                                                                                                                                                 CMS Preventative Services Quick Reference  Risk Factors Identified During Encounter  Inactivity/Sedentary: Patient was advised to exercise at least 150 minutes a week per CDC recommendations. and Patient was advised to do at least 150 minutes a week of moderate intensity activity such as brisk walking and do at least 2 days a week of activities that strengthen muscles such as resistance training and do activities to improve balance such as standing on one foot about 3 days a week.    The above risks/problems have been discussed with the patient.  Pertinent information has been shared with the patient in the After Visit Summary.  An After Visit Summary and PPPS were made available to the patient.    Follow Up:  Next Medicare Wellness visit to be scheduled in 1 year.         Additional E&M Note during same encounter follows:  Patient has additional, significant, and separately identifiable condition(s)/problem(s) that require work above and beyond the Medicare Wellness Visit     Chief Complaint  Medicare Wellness-subsequent, Hypertension, impaired fasting glucose, Coronary Artery Disease, and Insomnia      HPI  Kamron is also being seen today for additional medical problem/s.     HTN, hyperlipidemia, IFG, CAD, insomnia for emphysema and seasonal allergies     Labs- 4/2024  Psa- 1/2022  cologard 2/2023 positive, colonoscopy 4.2023  Ct chest low dose  9/2023    C/o cough and postnasal drainage patient reports taking Zyrtec nightly as directed not consistent with this Flonase    Review of Systems   Constitutional:  Negative for fever.   HENT:  Positive for postnasal drip. Negative for ear pain.    Eyes:  Negative for visual disturbance.   Respiratory:  Positive for cough.    Cardiovascular:  Negative for chest pain.   Gastrointestinal:  Negative for abdominal pain.   Genitourinary:  Negative for  "dysuria.   Musculoskeletal:  Negative for myalgias.   Allergic/Immunologic: Positive for environmental allergies.              Objective   Vital Signs:  /79   Pulse 63   Temp 97.7 °F (36.5 °C)   Ht 180.3 cm (70.98\")   Wt 89.4 kg (197 lb)   SpO2 97%   BMI 27.49 kg/m²   Physical Exam  HENT:      Right Ear: Tympanic membrane normal.      Left Ear: Tympanic membrane normal.      Nose: Nose normal.      Mouth/Throat:      Mouth: Mucous membranes are moist.      Comments: Postnasal drainage  Eyes:      Conjunctiva/sclera: Conjunctivae normal.   Neck:      Vascular: No carotid bruit.   Cardiovascular:      Rate and Rhythm: Normal rate and regular rhythm.      Heart sounds: Normal heart sounds. No murmur heard.  Pulmonary:      Effort: Pulmonary effort is normal.      Breath sounds: Normal breath sounds.   Abdominal:      General: Bowel sounds are normal.      Palpations: Abdomen is soft.   Musculoskeletal:      Right lower leg: No edema.      Left lower leg: No edema.   Lymphadenopathy:      Cervical: No cervical adenopathy.   Skin:     General: Skin is warm and dry.   Neurological:      Mental Status: He is alert and oriented to person, place, and time.   Psychiatric:         Mood and Affect: Mood normal.         Behavior: Behavior normal.                 Assessment and Plan             Encounter for Medicare annual wellness exam  Immunizations screening reviewed with patient recommend influenza vaccine when available  Primary hypertension  Currently controlled Toprol-XL  Mixed hyperlipidemia  Will obtain lipid panel and CMP to monitor current statin dose Lipitor 40 mg at nighttime  Impaired fasting glucose  Obtain hemoglobin A1c to monitor recommend reduce added carbs sugars increase exercise to 30 minutes daily weight loss  Cervical spondylosis without myelopathy  Pain currently controlled gabapentin Rufino reviewed urine drug screen obtained will provide a refill  DDD (degenerative disc disease), " cervical    Primary insomnia  Controlled with gabapentin  Pulmonary emphysema, unspecified emphysema type    No wheezing or shortness of breath lungs clear stable on Spiriva daily albuterol inhaler as needed oncology has ordered CT low-dose of the chest for September  Vitamin D deficiency  Continue 2000 units supplementation daily  Screening for thyroid disorder    Cervico-occipital neuralgia    Currently controlled with gabapentin Rufino reviewed urine drug screen obtained      Medication management    Seasonal allergies  Uncontrolled with Zyrtec recommend consistent use of Flonase will add Singulair    Orders Placed This Encounter   Procedures    CBC Auto Differential     Order Specific Question:   Release to patient     Answer:   Routine Release [[id number]]    Comprehensive Metabolic Panel     Order Specific Question:   Release to patient     Answer:   Routine Release [[id number]]    Microalbumin / Creatinine Urine Ratio - Urine, Clean Catch     Order Specific Question:   Release to patient     Answer:   Routine Release [[id number]]    Hemoglobin A1c     Order Specific Question:   Release to patient     Answer:   Routine Release [[id number]]    Lipid Panel     Order Specific Question:   Release to patient     Answer:   Routine Release [[id number]]    TSH     Order Specific Question:   Release to patient     Answer:   Routine Release [[id number]]    Urinalysis With Culture If Indicated -     Order Specific Question:   Release to patient     Answer:   Routine Release [[id number]]    Vitamin D,25-Hydroxy     Order Specific Question:   Release to patient     Answer:   Routine Release [[id number]]    POC 12 Panel Urine Drug Screen     Order Specific Question:   Release to patient     Answer:   Routine Release [[id number]]     New Medications Ordered This Visit   Medications    albuterol sulfate  (90 Base) MCG/ACT inhaler     Sig: Inhale 2 puffs Every 4 (Four) Hours As Needed for Wheezing.     Dispense:  18 g      Refill:  2    atorvastatin (LIPITOR) 40 MG tablet     Sig: Take 1 tablet by mouth Every Night.     Dispense:  90 tablet     Refill:  1    Cetirizine HCl 10 MG capsule     Sig: Take 10 mg by mouth every night at bedtime.     Dispense:  90 capsule     Refill:  1    cholecalciferol (VITAMIN D3) 25 MCG (1000 UT) tablet     Sig: Take 2 tablets by mouth Daily.     Dispense:  180 tablet     Refill:  0    Cholecalciferol 50 MCG (2000 UT) tablet     Sig: Take 1 tablet by mouth Daily.     Dispense:  90 each     Refill:  1    gabapentin (NEURONTIN) 300 MG capsule     Sig: Take 1 capsule by mouth 3 (Three) Times a Day.     Dispense:  270 capsule     Refill:  1    tamsulosin (FLOMAX) 0.4 MG capsule 24 hr capsule     Sig: Take 1 capsule by mouth Daily.     Dispense:  30 capsule     Refill:  1    Tiotropium Bromide Monohydrate (Spiriva Respimat) 1.25 MCG/ACT aerosol solution inhaler     Sig: Inhale 2 puffs Daily.     Dispense:  3 each     Refill:  1    meclizine (ANTIVERT) 25 MG tablet     Sig: Take 1 tablet by mouth 3 (Three) Times a Day As Needed for Dizziness.     Dispense:  270 tablet     Refill:  1    fluticasone (FLONASE) 50 MCG/ACT nasal spray     Si sprays into the nostril(s) as directed by provider Daily. 2 sprays each nostril daily     Dispense:  3 mL     Refill:  1    montelukast (SINGULAIR) 10 MG tablet     Sig: Take 1 tablet by mouth Every Night.     Dispense:  90 tablet     Refill:  1          Follow Up   Return in about 6 months (around 2025).  Patient was given instructions and counseling regarding his condition or for health maintenance advice. Please see specific information pulled into the AVS if appropriate.

## 2024-08-09 NOTE — ASSESSMENT & PLAN NOTE
No wheezing or shortness of breath lungs clear stable on Spiriva daily albuterol inhaler as needed oncology has ordered CT low-dose of the chest for September

## 2024-08-13 ENCOUNTER — OFFICE VISIT (OUTPATIENT)
Dept: FAMILY MEDICINE CLINIC | Facility: CLINIC | Age: 74
End: 2024-08-13
Payer: MEDICARE

## 2024-08-13 VITALS
OXYGEN SATURATION: 97 % | HEIGHT: 71 IN | SYSTOLIC BLOOD PRESSURE: 137 MMHG | WEIGHT: 197 LBS | BODY MASS INDEX: 27.58 KG/M2 | HEART RATE: 63 BPM | DIASTOLIC BLOOD PRESSURE: 79 MMHG | TEMPERATURE: 97.7 F

## 2024-08-13 DIAGNOSIS — Z00.00 ENCOUNTER FOR MEDICARE ANNUAL WELLNESS EXAM: Primary | ICD-10-CM

## 2024-08-13 DIAGNOSIS — J43.9 PULMONARY EMPHYSEMA, UNSPECIFIED EMPHYSEMA TYPE: ICD-10-CM

## 2024-08-13 DIAGNOSIS — Z79.899 MEDICATION MANAGEMENT: ICD-10-CM

## 2024-08-13 DIAGNOSIS — M54.81 CERVICO-OCCIPITAL NEURALGIA: ICD-10-CM

## 2024-08-13 DIAGNOSIS — M50.30 DDD (DEGENERATIVE DISC DISEASE), CERVICAL: ICD-10-CM

## 2024-08-13 DIAGNOSIS — J30.2 SEASONAL ALLERGIES: ICD-10-CM

## 2024-08-13 DIAGNOSIS — I10 PRIMARY HYPERTENSION: ICD-10-CM

## 2024-08-13 DIAGNOSIS — M47.812 CERVICAL SPONDYLOSIS WITHOUT MYELOPATHY: ICD-10-CM

## 2024-08-13 DIAGNOSIS — F51.01 PRIMARY INSOMNIA: ICD-10-CM

## 2024-08-13 DIAGNOSIS — R73.01 IMPAIRED FASTING GLUCOSE: ICD-10-CM

## 2024-08-13 DIAGNOSIS — Z13.29 SCREENING FOR THYROID DISORDER: ICD-10-CM

## 2024-08-13 DIAGNOSIS — E78.2 MIXED HYPERLIPIDEMIA: ICD-10-CM

## 2024-08-13 DIAGNOSIS — E55.9 VITAMIN D DEFICIENCY: ICD-10-CM

## 2024-08-13 LAB
25(OH)D3 SERPL-MCNC: 40.1 NG/ML (ref 30–100)
ALBUMIN SERPL-MCNC: 3.9 G/DL (ref 3.5–5.2)
ALBUMIN UR-MCNC: <1.2 MG/DL
ALBUMIN/GLOB SERPL: 1.5 G/DL
ALP SERPL-CCNC: 96 U/L (ref 39–117)
ALT SERPL W P-5'-P-CCNC: 13 U/L (ref 1–41)
AMPHET+METHAMPHET UR QL: NEGATIVE
AMPHETAMINE INTERNAL CONTROL: NORMAL
AMPHETAMINES UR QL: NEGATIVE
ANION GAP SERPL CALCULATED.3IONS-SCNC: 9 MMOL/L (ref 5–15)
AST SERPL-CCNC: 15 U/L (ref 1–40)
BARBITURATE INTERNAL CONTROL: NORMAL
BARBITURATES UR QL SCN: NEGATIVE
BASOPHILS # BLD MANUAL: 0 10*3/MM3 (ref 0–0.2)
BASOPHILS NFR BLD MANUAL: 0 % (ref 0–1.5)
BENZODIAZ UR QL SCN: NEGATIVE
BENZODIAZEPINE INTERNAL CONTROL: NORMAL
BILIRUB SERPL-MCNC: 0.4 MG/DL (ref 0–1.2)
BILIRUB UR QL STRIP: NEGATIVE
BUN SERPL-MCNC: 15 MG/DL (ref 8–23)
BUN/CREAT SERPL: 18.1 (ref 7–25)
BUPRENORPHINE INTERNAL CONTROL: NORMAL
BUPRENORPHINE SERPL-MCNC: NEGATIVE NG/ML
CALCIUM SPEC-SCNC: 8.7 MG/DL (ref 8.6–10.5)
CANNABINOIDS SERPL QL: NEGATIVE
CHLORIDE SERPL-SCNC: 104 MMOL/L (ref 98–107)
CHOLEST SERPL-MCNC: 109 MG/DL (ref 0–200)
CLARITY UR: CLEAR
CO2 SERPL-SCNC: 26 MMOL/L (ref 22–29)
COCAINE INTERNAL CONTROL: NORMAL
COCAINE UR QL: NEGATIVE
COLOR UR: YELLOW
CREAT SERPL-MCNC: 0.83 MG/DL (ref 0.76–1.27)
CREAT UR-MCNC: 15.6 MG/DL
DEPRECATED RDW RBC AUTO: 46.9 FL (ref 37–54)
EGFRCR SERPLBLD CKD-EPI 2021: 91.3 ML/MIN/1.73
EOSINOPHIL # BLD MANUAL: 0.29 10*3/MM3 (ref 0–0.4)
EOSINOPHIL NFR BLD MANUAL: 2 % (ref 0.3–6.2)
ERYTHROCYTE [DISTWIDTH] IN BLOOD BY AUTOMATED COUNT: 13.2 % (ref 12.3–15.4)
EXPIRATION DATE: NORMAL
GLOBULIN UR ELPH-MCNC: 2.6 GM/DL
GLUCOSE SERPL-MCNC: 114 MG/DL (ref 65–99)
GLUCOSE UR STRIP-MCNC: NEGATIVE MG/DL
HBA1C MFR BLD: 6.1 % (ref 4.8–5.6)
HCT VFR BLD AUTO: 37.5 % (ref 37.5–51)
HDLC SERPL-MCNC: 34 MG/DL (ref 40–60)
HGB BLD-MCNC: 12.4 G/DL (ref 13–17.7)
HGB UR QL STRIP.AUTO: NEGATIVE
HOLD SPECIMEN: NORMAL
KETONES UR QL STRIP: NEGATIVE
LDLC SERPL CALC-MCNC: 60 MG/DL (ref 0–100)
LDLC/HDLC SERPL: 1.78 {RATIO}
LEUKOCYTE ESTERASE UR QL STRIP.AUTO: NEGATIVE
LYMPHOCYTES # BLD MANUAL: 9.78 10*3/MM3 (ref 0.7–3.1)
LYMPHOCYTES NFR BLD MANUAL: 5 % (ref 5–12)
Lab: NORMAL
MCH RBC QN AUTO: 32.2 PG (ref 26.6–33)
MCHC RBC AUTO-ENTMCNC: 33.1 G/DL (ref 31.5–35.7)
MCV RBC AUTO: 97.4 FL (ref 79–97)
MDMA (ECSTASY) INTERNAL CONTROL: NORMAL
MDMA UR QL SCN: NEGATIVE
METHADONE INTERNAL CONTROL: NORMAL
METHADONE UR QL SCN: NEGATIVE
METHAMPHETAMINE INTERNAL CONTROL: NORMAL
MICROALBUMIN/CREAT UR: NORMAL MG/G{CREAT}
MONOCYTES # BLD: 0.72 10*3/MM3 (ref 0.1–0.9)
MORPHINE INTERNAL CONTROL: NORMAL
MORPHINE/OPIATES SCREEN, URINE: NEGATIVE
NEUTROPHILS # BLD AUTO: 3.6 10*3/MM3 (ref 1.7–7)
NEUTROPHILS NFR BLD MANUAL: 25 % (ref 42.7–76)
NITRITE UR QL STRIP: NEGATIVE
OXYCODONE INTERNAL CONTROL: NORMAL
OXYCODONE UR QL SCN: NEGATIVE
PCP UR QL SCN: NEGATIVE
PH UR STRIP.AUTO: 6.5 [PH] (ref 5–8)
PHENCYCLIDINE INTERNAL CONTROL: NORMAL
PLAT MORPH BLD: NORMAL
PLATELET # BLD AUTO: 94 10*3/MM3 (ref 140–450)
PMV BLD AUTO: 12.7 FL (ref 6–12)
POIKILOCYTOSIS BLD QL SMEAR: ABNORMAL
POTASSIUM SERPL-SCNC: 4 MMOL/L (ref 3.5–5.2)
PROT SERPL-MCNC: 6.5 G/DL (ref 6–8.5)
PROT UR QL STRIP: NEGATIVE
RBC # BLD AUTO: 3.85 10*6/MM3 (ref 4.14–5.8)
SMUDGE CELLS BLD QL SMEAR: ABNORMAL
SODIUM SERPL-SCNC: 139 MMOL/L (ref 136–145)
SP GR UR STRIP: <=1.005 (ref 1–1.03)
THC INTERNAL CONTROL: NORMAL
TRIGL SERPL-MCNC: 72 MG/DL (ref 0–150)
TSH SERPL DL<=0.05 MIU/L-ACNC: 3.15 UIU/ML (ref 0.27–4.2)
UROBILINOGEN UR QL STRIP: NORMAL
VARIANT LYMPHS NFR BLD MANUAL: 68 % (ref 19.6–45.3)
VLDLC SERPL-MCNC: 15 MG/DL (ref 5–40)
WBC NRBC COR # BLD AUTO: 14.38 10*3/MM3 (ref 3.4–10.8)

## 2024-08-13 PROCEDURE — 85007 BL SMEAR W/DIFF WBC COUNT: CPT | Performed by: NURSE PRACTITIONER

## 2024-08-13 PROCEDURE — 84443 ASSAY THYROID STIM HORMONE: CPT | Performed by: NURSE PRACTITIONER

## 2024-08-13 PROCEDURE — 85025 COMPLETE CBC W/AUTO DIFF WBC: CPT | Performed by: NURSE PRACTITIONER

## 2024-08-13 PROCEDURE — 82306 VITAMIN D 25 HYDROXY: CPT | Performed by: NURSE PRACTITIONER

## 2024-08-13 PROCEDURE — 80061 LIPID PANEL: CPT | Performed by: NURSE PRACTITIONER

## 2024-08-13 PROCEDURE — 83036 HEMOGLOBIN GLYCOSYLATED A1C: CPT | Performed by: NURSE PRACTITIONER

## 2024-08-13 PROCEDURE — 80053 COMPREHEN METABOLIC PANEL: CPT | Performed by: NURSE PRACTITIONER

## 2024-08-13 PROCEDURE — 82043 UR ALBUMIN QUANTITATIVE: CPT | Performed by: NURSE PRACTITIONER

## 2024-08-13 PROCEDURE — 81003 URINALYSIS AUTO W/O SCOPE: CPT | Performed by: NURSE PRACTITIONER

## 2024-08-13 PROCEDURE — 82570 ASSAY OF URINE CREATININE: CPT | Performed by: NURSE PRACTITIONER

## 2024-08-13 RX ORDER — MONTELUKAST SODIUM 10 MG/1
10 TABLET ORAL NIGHTLY
Qty: 90 TABLET | Refills: 1 | Status: SHIPPED | OUTPATIENT
Start: 2024-08-13

## 2024-08-13 RX ORDER — CHOLECALCIFEROL (VITAMIN D3) 25 MCG
2000 TABLET ORAL DAILY
Qty: 180 TABLET | Refills: 0 | Status: SHIPPED | OUTPATIENT
Start: 2024-08-13

## 2024-08-13 RX ORDER — FLUTICASONE PROPIONATE 50 MCG
2 SPRAY, SUSPENSION (ML) NASAL DAILY
Qty: 3 ML | Refills: 1 | Status: SHIPPED | OUTPATIENT
Start: 2024-08-13

## 2024-08-13 RX ORDER — ALBUTEROL SULFATE 90 UG/1
2 AEROSOL, METERED RESPIRATORY (INHALATION) EVERY 4 HOURS PRN
Qty: 18 G | Refills: 2 | Status: SHIPPED | OUTPATIENT
Start: 2024-08-13

## 2024-08-13 RX ORDER — TIOTROPIUM BROMIDE INHALATION SPRAY 1.56 UG/1
2 SPRAY, METERED RESPIRATORY (INHALATION)
Qty: 3 EACH | Refills: 1 | Status: SHIPPED | OUTPATIENT
Start: 2024-08-13

## 2024-08-13 RX ORDER — FLUTICASONE PROPIONATE 50 MCG
2 SPRAY, SUSPENSION (ML) NASAL DAILY
Qty: 3 ML | Refills: 1 | Status: SHIPPED | OUTPATIENT
Start: 2024-08-13 | End: 2024-08-13 | Stop reason: SDUPTHER

## 2024-08-13 RX ORDER — TAMSULOSIN HYDROCHLORIDE 0.4 MG/1
1 CAPSULE ORAL DAILY
Qty: 30 CAPSULE | Refills: 1 | Status: SHIPPED | OUTPATIENT
Start: 2024-08-13

## 2024-08-13 RX ORDER — ATORVASTATIN CALCIUM 40 MG/1
40 TABLET, FILM COATED ORAL NIGHTLY
Qty: 90 TABLET | Refills: 1 | Status: SHIPPED | OUTPATIENT
Start: 2024-08-13

## 2024-08-13 RX ORDER — MECLIZINE HYDROCHLORIDE 25 MG/1
25 TABLET ORAL 3 TIMES DAILY PRN
Qty: 270 TABLET | Refills: 1 | Status: SHIPPED | OUTPATIENT
Start: 2024-08-13

## 2024-08-13 RX ORDER — GABAPENTIN 300 MG/1
300 CAPSULE ORAL 3 TIMES DAILY
Qty: 270 CAPSULE | Refills: 1 | Status: SHIPPED | OUTPATIENT
Start: 2024-08-13

## 2024-08-13 NOTE — ASSESSMENT & PLAN NOTE
Pain currently controlled gabapentin Rufino reviewed urine drug screen obtained will provide a refill

## 2024-08-13 NOTE — ASSESSMENT & PLAN NOTE
Obtain hemoglobin A1c to monitor recommend reduce added carbs sugars increase exercise to 30 minutes daily weight loss

## 2024-10-14 ENCOUNTER — APPOINTMENT (OUTPATIENT)
Dept: ONCOLOGY | Facility: HOSPITAL | Age: 74
End: 2024-10-14
Payer: MEDICARE

## 2024-10-14 ENCOUNTER — LAB (OUTPATIENT)
Dept: LAB | Facility: HOSPITAL | Age: 74
End: 2024-10-14
Payer: MEDICARE

## 2024-10-14 ENCOUNTER — HOSPITAL ENCOUNTER (OUTPATIENT)
Dept: CT IMAGING | Facility: HOSPITAL | Age: 74
Discharge: HOME | End: 2024-10-14
Payer: MEDICARE

## 2024-10-14 DIAGNOSIS — C34.11 MALIGNANT NEOPLASM OF UPPER LOBE OF RIGHT LUNG: ICD-10-CM

## 2024-10-14 DIAGNOSIS — D69.6 THROMBOCYTOPENIA: ICD-10-CM

## 2024-10-14 LAB
BASOPHILS # BLD MANUAL: 0 10*3/MM3 (ref 0–0.2)
BASOPHILS NFR BLD MANUAL: 0 % (ref 0–1.5)
DEPRECATED RDW RBC AUTO: 45.6 FL (ref 37–54)
EOSINOPHIL # BLD MANUAL: 0 10*3/MM3 (ref 0–0.4)
EOSINOPHIL NFR BLD MANUAL: 0 % (ref 0.3–6.2)
ERYTHROCYTE [DISTWIDTH] IN BLOOD BY AUTOMATED COUNT: 12.8 % (ref 12.3–15.4)
HCT VFR BLD AUTO: 40.1 % (ref 37.5–51)
HGB BLD-MCNC: 13.2 G/DL (ref 13–17.7)
LYMPHOCYTES # BLD MANUAL: 12.07 10*3/MM3 (ref 0.7–3.1)
LYMPHOCYTES NFR BLD MANUAL: 6.1 % (ref 5–12)
MCH RBC QN AUTO: 31.7 PG (ref 26.6–33)
MCHC RBC AUTO-ENTMCNC: 32.9 G/DL (ref 31.5–35.7)
MCV RBC AUTO: 96.4 FL (ref 79–97)
MONOCYTES # BLD: 0.91 10*3/MM3 (ref 0.1–0.9)
NEUTROPHILS # BLD AUTO: 1.99 10*3/MM3 (ref 1.7–7)
NEUTROPHILS NFR BLD MANUAL: 13.3 % (ref 42.7–76)
PLAT MORPH BLD: NORMAL
PLATELET # BLD AUTO: 86 10*3/MM3 (ref 140–450)
PMV BLD AUTO: 13.3 FL (ref 6–12)
RBC # BLD AUTO: 4.16 10*6/MM3 (ref 4.14–5.8)
RBC MORPH BLD: NORMAL
SMUDGE CELLS BLD QL SMEAR: ABNORMAL
VARIANT LYMPHS NFR BLD MANUAL: 80.6 % (ref 19.6–45.3)
WBC NRBC COR # BLD AUTO: 14.97 10*3/MM3 (ref 3.4–10.8)

## 2024-10-14 PROCEDURE — 85025 COMPLETE CBC W/AUTO DIFF WBC: CPT

## 2024-10-14 PROCEDURE — 85007 BL SMEAR W/DIFF WBC COUNT: CPT

## 2024-10-14 PROCEDURE — 71250 CT THORAX DX C-: CPT

## 2024-10-14 PROCEDURE — 36415 COLL VENOUS BLD VENIPUNCTURE: CPT

## 2024-10-17 ENCOUNTER — OFFICE VISIT (OUTPATIENT)
Dept: ONCOLOGY | Facility: HOSPITAL | Age: 74
End: 2024-10-17
Payer: MEDICARE

## 2024-10-17 VITALS
OXYGEN SATURATION: 98 % | RESPIRATION RATE: 18 BRPM | TEMPERATURE: 97.7 F | HEIGHT: 71 IN | SYSTOLIC BLOOD PRESSURE: 131 MMHG | DIASTOLIC BLOOD PRESSURE: 79 MMHG | WEIGHT: 194.45 LBS | HEART RATE: 58 BPM | BODY MASS INDEX: 27.22 KG/M2

## 2024-10-17 DIAGNOSIS — D69.6 THROMBOCYTOPENIA: Primary | ICD-10-CM

## 2024-10-17 DIAGNOSIS — C34.90 NON-SMALL CELL LUNG CANCER, UNSPECIFIED LATERALITY: ICD-10-CM

## 2024-10-17 PROCEDURE — G0463 HOSPITAL OUTPT CLINIC VISIT: HCPCS | Performed by: INTERNAL MEDICINE

## 2024-10-17 RX ORDER — NITROGLYCERIN 0.4 MG/1
0.4 TABLET SUBLINGUAL
COMMUNITY
Start: 2024-08-26 | End: 2025-08-26

## 2024-10-17 NOTE — PROGRESS NOTES
Chief Complaint  FOLLOW UP 2       Colasanti, Chrysalis An*  Coljoseph, Kayleyalis Dianne, APRN      Subjective          Kamron Sanchez presents to [facility] for  NSCLC    Lung Cancer  Pertinent negatives include no abdominal pain, arthralgias, chest pain, coughing, diaphoresis, fatigue, fever, joint swelling, myalgias, nausea, numbness, rash, sore throat, swollen glands, vomiting or weakness.      Mr. Kamron Sanchez presents for 1 year follow up for RUL NSCLC in April of 2016. He is status post RUL lobectomy with invasive adenocarcinoma with Dr. Heller from  2016.     He is followed yearly for CT scans. He also has has thrombocytocytopenia.  Takes aspirin every other day 3 per instruction of his cardiologist due to easy bruising. Continues to have easy bruising. No bleeding. As of 10/14/24 plts at 86K. Low of 45k 7/2021. Doing well today. No acute complaints. Reports he has gained more weight than he is used to.  No fevers, chills, infections. No chest pain, nausea, vomiting, or abdominal pain. CT Chest completed 10/14/24 was stable. Results discussed.      Cancer Staging  No matching staging information was found for the patient.     Treatment intent: curative    Oncology/Hematology History   Lung cancer   4/18/2016 Cancer Staged    Staging form: Lung, AJCC 8th Edition  - Pathologic stage from 4/18/2016: Stage IB (pT2a, pN0, cM0) - Signed by Adriano Willard MD on 9/29/2023 7/6/2021 Initial Diagnosis    Lung cancer       1) RUL NSCLC: -Right upper lobe lobectomy 4/6/16:  Staging: pT2aN0. Path:     Invasive adenocarcinoma, grade 3, 2 cm in size. Visceral pleura invasion with     lymphvascular invasion. Margins neg, vascular neg, neg parenchymal. 0 of 15 LN's    negative in [place] with Dr. Heller.             Treatment history:       -1/26/16: Patient presented with right shoulder pain and this included a chest     x-ray on 01/26 which showed an ill defined 3 cm opacity  in the right lung base.           2/8/16:  Chest CT showed a 1.3 cm spiculated pleural based nodule anteriorly in     the right upper lobe suspicious for malignancy.  Also noted on the CT scan was     adenopathy adjacent to the GE junction measuring 2 cm and PET scan was     recommended.        2/15/16: PET scan showed hypermetabolic uptake in the right upper lobe pulmonary    nodule with a maximum SUV of 4.6.  There was a small focus of activity in the jesus    bcarinal region that was not significantly above the mediastinal blood pool     activity and was felt to be unlikely to represent metastatic disease.  The area     of suspected adenopathy that was seen near the GE junction on CT was not     hypermetabolic on PET.  There was a sclerotic lesion noted in the right iliac     bone which was also not hypermetabolic and was felt unlikely to represent met    astatic lesion.  The patient was referred to oncology for further     recommendations. At the time of his initial visit, he was referred for biopsy of    the nodule.  He was complaining of some periodic headaches and MRI brain was     ordered.  There were no findings to suggest intracranial metastasis.  He     underwent biopsy of the RUL lesion on 3/2.  This was positive for adenocarcinoma    and also stained positive for CDX2, raising concern for GI primary.  He was     referred for upper and lower endoscopy and underwent these on 3/10 under the     care of Dr. España.  Pathology from a stomach biopsy showed chronic gastritis     and staining was positive for H pylori.  No evidence of GI malignancy was found     on EGD or colonoscopy.  He was referred to  for determination of his surgical     candidacy and underwent surgery on 4/6 with Dr. Macarena Heller.      -4/6/16:  RUL lobectomy under the care of Dr. Heller. Path: RUL lobectomy -    invasive adenocarcinoma, grade 3 (pT2aN0). -tumor size 2cm -histologic type:     adenocarcinoma -INVASION OF VISCERAL PLEURA.  LYMPHOVASCULAR INVASION. Margins: -    bronchial: negative -vascular: negative -parenchymal: negative -distance to     closest margin: 1.8cm (bronchial). Ratio of positive/total nodes: 0/15.       -4/6/16.Adjuvant chemotherapy was recommended due to high risk features.      Declined chemotherapy.       -12/9/16: PET/CT showed postsurgical changes status post interval right upper     lobectomy with removal of right upper lobe cancer. No evidence of local     recurrence or thoracic lymphadenopathy. No PET/CT evidence of metastasis within     neck, abdomen, or pelvis.      -July 2017.  Chest CT showed a decrease in size of the mediastinal and hilar     lymph nodes from 12/09/2016.  No convincing evidence of metastatic disease      -12/27/17: CT chest at the [facility] showed no evidence of cancer.  12    mm subcarinal lymph node was 15 mm previously.      -8/1/2019: No evidence of local recurrence or metastasis within the CAP.    8/2020: CT chest: No evidence of recurrence or metastasis.     8/2021: CT chest: no evidence of recurrence or metastasis.     Review of Systems   Constitutional:  Positive for unexpected weight gain. Negative for appetite change, diaphoresis, fatigue, fever and unexpected weight loss.   HENT:  Negative for hearing loss, mouth sores, sore throat, swollen glands, trouble swallowing and voice change.    Eyes:  Negative for blurred vision, double vision, pain, redness and visual disturbance.   Respiratory:  Negative for cough, shortness of breath and wheezing.    Cardiovascular:  Negative for chest pain, palpitations and leg swelling.   Gastrointestinal:  Negative for abdominal pain, blood in stool, constipation, diarrhea, nausea and vomiting.   Endocrine: Negative for cold intolerance, heat intolerance, polydipsia and polyuria.   Genitourinary:  Negative for decreased urine volume, difficulty urinating, dysuria, frequency, hematuria and urinary incontinence.   Musculoskeletal:  Negative  for arthralgias, back pain, joint swelling and myalgias.   Skin:  Negative for color change, rash, skin lesions and wound.   Neurological:  Negative for dizziness, seizures, weakness, numbness and headache.   Hematological:  Negative for adenopathy. Does not bruise/bleed easily.   Psychiatric/Behavioral:  Negative for depressed mood. The patient is not nervous/anxious.    All other systems reviewed and are negative.      Current Outpatient Medications on File Prior to Visit   Medication Sig Dispense Refill    albuterol sulfate  (90 Base) MCG/ACT inhaler Inhale 2 puffs Every 4 (Four) Hours As Needed for Wheezing. 18 g 2    ascorbic acid (VITAMIN C) 1000 MG tablet Vitamin C 1,000 mg oral tablet take 1 tablet by oral route daily   Active      aspirin 81 MG EC tablet Take 1 tablet by mouth Daily.      atorvastatin (LIPITOR) 40 MG tablet Take 1 tablet by mouth Every Night. 90 tablet 1    Cetirizine HCl 10 MG capsule Take 10 mg by mouth every night at bedtime. 90 capsule 1    cholecalciferol (VITAMIN D3) 25 MCG (1000 UT) tablet Take 2 tablets by mouth Daily. 180 tablet 0    Cholecalciferol 50 MCG (2000 UT) tablet Take 1 tablet by mouth Daily. 90 each 1    Diclofenac Sodium (VOLTAREN) 1 % gel gel Apply 4 g topically to the appropriate area as directed 4 (Four) Times a Day As Needed (joint). 350 g 1    fluorouracil (EFUDEX) 5 % cream       fluticasone (FLONASE) 50 MCG/ACT nasal spray 2 sprays into the nostril(s) as directed by provider Daily. 2 sprays each nostril daily 3 mL 1    gabapentin (NEURONTIN) 300 MG capsule Take 1 capsule by mouth 3 (Three) Times a Day. 270 capsule 1    isosorbide mononitrate (IMDUR) 60 MG 24 hr tablet       lidocaine (LIDODERM) 5 %       meclizine (ANTIVERT) 25 MG tablet Take 1 tablet by mouth 3 (Three) Times a Day As Needed for Dizziness. 270 tablet 1    metoprolol succinate XL (TOPROL-XL) 50 MG 24 hr tablet       montelukast (SINGULAIR) 10 MG tablet Take 1 tablet by mouth Every Night. 90  tablet 1    MoviPrep 100 g reconstituted solution powder       nitroglycerin (NITROSTAT) 0.3 MG SL tablet       simethicone (Gas-X) 80 MG chewable tablet Take 2 tablets PO after completing movi prep and 2 tablets PO 4 hours prior to procedure. 4 tablet 0    tamsulosin (FLOMAX) 0.4 MG capsule 24 hr capsule Take 1 capsule by mouth Daily. 30 capsule 1    Tiotropium Bromide Monohydrate (Spiriva Respimat) 1.25 MCG/ACT aerosol solution inhaler Inhale 2 puffs Daily. 3 each 1     No current facility-administered medications on file prior to visit.       No Known Allergies  Past Medical History:   Diagnosis Date    Abnormal blood chemistry 2014    Elevated RBC count    Arthritis 2014    Arthropathy, unspecified     multiple sites    Cancer     Cervicogenic headache 2019    Emphysema of lung 2016    Heart attack     Hepatitis C     Hyperlipemia     Hyperlipidemia 2014    Hypertension 2014    Impaired fasting glucose 2014    Lung cancer     Lung disease     Lung nodule seen on imaging study 2016    Myocardial infarction     Shortness of breath     Tobacco abuse 2016     Past Surgical History:   Procedure Laterality Date    CARDIAC SURGERY      CARDIAC VALVE SURGERY      COLONOSCOPY      COLONOSCOPY N/A 2023    Procedure: COLONOSCOPY WITH POLYPECTOMIES, HOT SNARE, CLIP APPLICATION X2;  Surgeon: Tamir España MD;  Location: [facility] ENDOSCOPY;  Service: General;  Laterality: N/A;  COLON POLYPS    CORONARY STENT PLACEMENT      FINGER SURGERY      HAND SURGERY      HERNIA REPAIR      SKIN CANCER EXCISION      TONSILLECTOMY       Social History     Socioeconomic History    Marital status:    Tobacco Use    Smoking status: Former     Current packs/day: 0.00     Average packs/day: 2.0 packs/day for 54.0 years (108.0 ttl pk-yrs)     Types: Cigarettes     Start date:      Quit date:      Years since quittin.8     Passive exposure: Never    Smokeless  "tobacco: Never   Vaping Use    Vaping status: Never Used   Substance and Sexual Activity    Alcohol use: Never    Drug use: Never    Sexual activity: Defer     Family History   Problem Relation Age of Onset    Cancer Mother     Diabetes Father     Arthritis Father     Cancer Father     Stroke Other     Breast cancer Other      Immunization History   Administered Date(s) Administered    COVID-19 (MODERNA) 1st,2nd,3rd Dose Monovalent 03/03/2021, 03/31/2021    COVID-19 (MODERNA) Monovalent Original Booster 11/02/2021    Influenza, Unspecified 01/06/2021    Pneumococcal Polysaccharide (PPSV23) 01/06/2021    Tdap 07/01/2018       Objective   Physical Exam  Constitutional:       Appearance: Normal appearance.   HENT:      Head: Normocephalic and atraumatic.      Nose: Nose normal.      Mouth/Throat:      Mouth: Mucous membranes are moist.   Eyes:      Conjunctiva/sclera: Conjunctivae normal.   Pulmonary:      Effort: Pulmonary effort is normal.   Neurological:      General: No focal deficit present.      Mental Status: He is alert. Mental status is at baseline.   Psychiatric:         Mood and Affect: Mood normal.         Behavior: Behavior normal.         Thought Content: Thought content normal.         Judgment: Judgment normal.         Vitals:    10/17/24 0759   BP: 131/79   Pulse: 58   Resp: 18   Temp: 97.7 °F (36.5 °C)   TempSrc: Temporal   SpO2: 98%   Weight: 88.2 kg (194 lb 7.1 oz)   Height: 180.3 cm (70.98\")   PainSc: 0-No pain                     ECOG: (0) Fully Active - Able to Carry On All Pre-disease Performance Without Restriction  Fall Risk Assessment was completed, and patient is at low risk for falls.  PHQ-9 Total Score:         The patient is  experiencing fatigue. Fatigue score: 2    PT/OT Functional Screening: PT fx screen: No needs identified  Speech Functional Screening: Speech fx screen: No needs identified  Rehab to be ordered: Rehab to be ordered: No needs identified        Result Review :   The " following data was reviewed by: Adriano Willard MD on 10/17/24:  Lab Results   Component Value Date    HGB 13.2 10/14/2024    HCT 40.1 10/14/2024    MCV 96.4 10/14/2024    PLT 86 (L) 10/14/2024    WBC 14.97 (H) 10/14/2024    NEUTROABS 1.99 10/14/2024    LYMPHSABS 8.89 (H) 04/12/2024    MONOSABS 0.89 04/12/2024    EOSABS 0.00 10/14/2024    BASOSABS 0.00 10/14/2024     Lab Results   Component Value Date    GLUCOSE 114 (H) 08/13/2024    BUN 15 08/13/2024    CREATININE 0.83 08/13/2024     08/13/2024    K 4.0 08/13/2024     08/13/2024    CO2 26.0 08/13/2024    CALCIUM 8.7 08/13/2024    PROTEINTOT 6.5 08/13/2024    ALBUMIN 3.9 08/13/2024    BILITOT 0.4 08/13/2024    ALKPHOS 96 08/13/2024    AST 15 08/13/2024    ALT 13 08/13/2024     Labs personally reviewed and plts at 73K.    Cardiology note personally reviewed    CT chest personally reviewed and per my independent read with some scarring RUL, no masses appreciated.         CT Chest Without Contrast Diagnostic    Result Date: 10/15/2024  Impression: 1. Stable postsurgical changes of a right upper lobectomy with scarring in the posterior right upper hemithorax. 2. Moderate centrilobular emphysema. 3. Current study limited due to lack of IV contrast. However, multiple enlarged mediastinal lymph nodes appear similar to the previous exam. Electronically Signed: Dipika Timmons MD  10/15/2024 10:46 AM EDT  Workstation ID: [id number]          Assessment and Plan    Diagnoses and all orders for this visit:    1. Thrombocytopenia (Primary)  -     CBC & Differential; Future    2. Non-small cell lung cancer, unspecified laterality  -     CT Chest Without Contrast; Future          NSCLC  Initially treated with RUL lobectomy 4/2016. CT scan obtained 10/14/24 without any change. Repeat CT Chest without contrast due 10/2025.      Thrombocytopenia  Plts 86K as of 10/14/24, shun of 45K 7/2021. Could have been from plavix. He has been switched to aspirin from plavix so  this may be helping.  Will continue to check plt count periodically. Repeat in 6 months. Counseled on if increased bleeding to let us know.     CAD  Following with cardiology. On ASA 3 times per week. Asymptomatic today.         Patient Follow Up: 1 year with CT chest and CBC.   Patient was given instructions and counseling regarding his condition or for health maintenance advice. Please see specific information pulled into the AVS if appropriate.

## 2024-11-15 ENCOUNTER — TELEPHONE (OUTPATIENT)
Dept: FAMILY MEDICINE CLINIC | Facility: CLINIC | Age: 74
End: 2024-11-15
Payer: MEDICARE

## 2024-11-15 RX ORDER — TAMSULOSIN HYDROCHLORIDE 0.4 MG/1
1 CAPSULE ORAL DAILY
Qty: 30 CAPSULE | Refills: 1 | Status: SHIPPED | OUTPATIENT
Start: 2024-11-15

## 2024-11-15 NOTE — TELEPHONE ENCOUNTER
Caller: Kamron Sanchez     Relationship: self    Best call back number: [phone]    Requested Prescriptions:    tamsulosin (FLOMAX) 0.4 MG capsule 24 hr capsule     Pharmacy where request should be sent  [facility] - [place], [place] - [address] [place] [id number] [phone] [address] [address]       Additional details provided by patient: Pt is requesting this be filled today as he is out after Saturday and pharmacy is closed till Monday    Does the patient have less than a 3 day supply:  [x] Yes  [ ] No    Would you like a call back once the refill request has been completed: [x ] Yes [ ] No    If the office needs to give you a call back, can they leave a voicemail: [ x] Yes [ ] No

## 2024-11-26 ENCOUNTER — TELEPHONE (OUTPATIENT)
Dept: ONCOLOGY | Facility: HOSPITAL | Age: 74
End: 2024-11-26

## 2024-11-26 NOTE — TELEPHONE ENCOUNTER
Caller: AMISHA    Relationship:     Best call back number: [phone]    What is the best time to reach you: ANYTIME    Who are you requesting to speak with (clinical staff, provider,  specific staff member): CLINICAL    What was the call regarding: [facility] CALLING WITH [facility] CONCERN REGARDING A POSSIBLE CLL DIAGNOSIS.  PLEASE ADVISE ON WHETHER PT HAS BEEN WORKED UP FOR CLL.

## 2024-12-16 ENCOUNTER — OFFICE VISIT (OUTPATIENT)
Dept: FAMILY MEDICINE CLINIC | Facility: CLINIC | Age: 74
End: 2024-12-16
Payer: MEDICARE

## 2024-12-16 VITALS
DIASTOLIC BLOOD PRESSURE: 75 MMHG | BODY MASS INDEX: 27.16 KG/M2 | HEIGHT: 71 IN | OXYGEN SATURATION: 97 % | HEART RATE: 100 BPM | TEMPERATURE: 98.3 F | WEIGHT: 194 LBS | SYSTOLIC BLOOD PRESSURE: 127 MMHG

## 2024-12-16 DIAGNOSIS — R05.9 COUGH, UNSPECIFIED TYPE: ICD-10-CM

## 2024-12-16 DIAGNOSIS — J40 BRONCHITIS: Primary | ICD-10-CM

## 2024-12-16 LAB
EXPIRATION DATE: NORMAL
FLUAV AG UPPER RESP QL IA.RAPID: NOT DETECTED
FLUBV AG UPPER RESP QL IA.RAPID: NOT DETECTED
INTERNAL CONTROL: NORMAL
Lab: NORMAL
SARS-COV-2 AG UPPER RESP QL IA.RAPID: NOT DETECTED

## 2024-12-16 PROCEDURE — 1126F AMNT PAIN NOTED NONE PRSNT: CPT | Performed by: STUDENT IN AN ORGANIZED HEALTH CARE EDUCATION/TRAINING PROGRAM

## 2024-12-16 PROCEDURE — 3078F DIAST BP <80 MM HG: CPT | Performed by: STUDENT IN AN ORGANIZED HEALTH CARE EDUCATION/TRAINING PROGRAM

## 2024-12-16 PROCEDURE — 87428 SARSCOV & INF VIR A&B AG IA: CPT | Performed by: STUDENT IN AN ORGANIZED HEALTH CARE EDUCATION/TRAINING PROGRAM

## 2024-12-16 PROCEDURE — 1159F MED LIST DOCD IN RCRD: CPT | Performed by: STUDENT IN AN ORGANIZED HEALTH CARE EDUCATION/TRAINING PROGRAM

## 2024-12-16 PROCEDURE — 1160F RVW MEDS BY RX/DR IN RCRD: CPT | Performed by: STUDENT IN AN ORGANIZED HEALTH CARE EDUCATION/TRAINING PROGRAM

## 2024-12-16 PROCEDURE — 99214 OFFICE O/P EST MOD 30 MIN: CPT | Performed by: STUDENT IN AN ORGANIZED HEALTH CARE EDUCATION/TRAINING PROGRAM

## 2024-12-16 PROCEDURE — 3074F SYST BP LT 130 MM HG: CPT | Performed by: STUDENT IN AN ORGANIZED HEALTH CARE EDUCATION/TRAINING PROGRAM

## 2024-12-16 NOTE — PROGRESS NOTES
"Chief Complaint  Fever and cough     Subjective      Kamron Sanchez is a 74 y.o. male who presents to [facility]     Same day visit:  Patient comes with cc of Sore Throat, cough and Fever. Covid and flu negative.     Objective   Vital Signs:   Vitals:    12/16/24 1131   BP: 127/75   Pulse: 100   Temp: 98.3 °F (36.8 °C)   TempSrc: Temporal   SpO2: 97%   Weight: 88 kg (194 lb)   Height: 180.3 cm (70.98\")     Body mass index is 27.07 kg/m².    Wt Readings from Last 3 Encounters:   12/16/24 88 kg (194 lb)   10/17/24 88.2 kg (194 lb 7.1 oz)   08/13/24 89.4 kg (197 lb)     BP Readings from Last 3 Encounters:   12/16/24 127/75   10/17/24 131/79   08/13/24 137/79       Health Maintenance   Topic Date Due    COVID-19 Vaccine (4 - 2024-25 season) 12/18/2024 (Originally 9/1/2024)    RSV Vaccine - Adults (1 - 1-dose 75+ series) 02/13/2025 (Originally 8/2/2024)    ZOSTER VACCINE (1 of 2) 02/13/2025 (Originally 8/2/1999)    INFLUENZA VACCINE  03/31/2025 (Originally 7/1/2024)    Pneumococcal Vaccine 65+ (2 of 2 - PCV) 07/25/2025 (Originally 1/6/2022)    BMI FOLLOWUP  02/14/2025    ANNUAL WELLNESS VISIT  08/13/2025    LIPID PANEL  08/13/2025    COLORECTAL CANCER SCREENING  04/17/2026    TDAP/TD VACCINES (2 - Td or Tdap) 07/01/2028    HEPATITIS C SCREENING  Completed    AAA SCREEN (ONE-TIME)  Completed    Hepatitis B  Aged Out    LUNG CANCER SCREENING  Discontinued       Physical Exam  Vitals reviewed.   HENT:      Head: Normocephalic.      Mouth/Throat:      Mouth: Mucous membranes are moist.   Eyes:      Pupils: Pupils are equal, round, and reactive to light.   Cardiovascular:      Rate and Rhythm: Normal rate.   Abdominal:      General: Abdomen is flat.   Musculoskeletal:         General: Normal range of motion.      Cervical back: Normal range of motion.   Skin:     General: Skin is warm.      Capillary Refill: Capillary refill takes less than 2 seconds.   Neurological:      Mental Status: He is " alert.            Assessment & Plan  Bronchitis  Patient instructed to go to the ED if symptoms worsen.        Cough, unspecified type    Orders:    POCT SARS-CoV-2 Antigen GALO + Flu               I spent 20 minutes caring for Kamron on this date of service. This time includes time spent by me in the following activities:preparing for the visit, reviewing tests, obtaining and/or reviewing a separately obtained history, performing a medically appropriate examination and/or evaluation, counseling and educating the patient/family/caregiver, ordering medications, tests, or procedures, referring and communicating with other health care professionals, documenting information in the medical record, independently interpreting results and communicating that information with the patient/family/caregiver, care coordination.    FOLLOW UP  No follow-ups on file.  Patient was given instructions and counseling regarding his condition or for health maintenance advice. Please see specific information pulled into the AVS if appropriate.       Solo Momin MD  12/16/24  12:16 EST    CURRENT & DISCONTINUED MEDICATIONS  Current Outpatient Medications   Medication Instructions    albuterol sulfate  (90 Base) MCG/ACT inhaler 2 puffs, Inhalation, Every 4 Hours PRN    amoxicillin-clavulanate (AUGMENTIN) 875-125 MG per tablet 1 tablet, Oral, 2 Times Daily    ascorbic acid (VITAMIN C) 1000 MG tablet Vitamin C 1,000 mg oral tablet take 1 tablet by oral route daily   Active    aspirin 81 mg, Daily    atorvastatin (LIPITOR) 40 mg, Oral, Nightly    Cetirizine HCl 10 mg, Oral, Every Night at Bedtime    cholecalciferol (VITAMIN D3) 2,000 Units, Oral, Daily    Cholecalciferol 50 mcg, Oral, Daily    Diclofenac Sodium (VOLTAREN) 4 g, Topical, 4 Times Daily PRN    fluorouracil (EFUDEX) 5 % cream     fluticasone (FLONASE) 50 MCG/ACT nasal spray 2 sprays, Nasal, Daily, 2 sprays each nostril daily    gabapentin (NEURONTIN) 300 mg,  Oral, 3 Times Daily    isosorbide mononitrate (IMDUR) 60 MG 24 hr tablet No dose, route, or frequency recorded.    lidocaine (LIDODERM) 5 %     meclizine (ANTIVERT) 25 mg, Oral, 3 Times Daily PRN    metoprolol succinate XL (TOPROL-XL) 50 MG 24 hr tablet No dose, route, or frequency recorded.    montelukast (SINGULAIR) 10 mg, Oral, Nightly    MoviPrep 100 g reconstituted solution powder No dose, route, or frequency recorded.    nitroglycerin (NITROSTAT) 0.3 MG SL tablet     nitroglycerin (NITROSTAT) 0.4 mg    simethicone (Gas-X) 80 MG chewable tablet Take 2 tablets PO after completing movi prep and 2 tablets PO 4 hours prior to procedure.    tamsulosin (FLOMAX) 0.4 mg, Oral, Daily    Tiotropium Bromide Monohydrate (Spiriva Respimat) 1.25 MCG/ACT aerosol solution inhaler 2 puffs, Inhalation, Daily - RT       There are no discontinued medications.

## 2024-12-27 ENCOUNTER — TELEPHONE (OUTPATIENT)
Dept: FAMILY MEDICINE CLINIC | Facility: CLINIC | Age: 74
End: 2024-12-27
Payer: MEDICARE

## 2024-12-27 NOTE — TELEPHONE ENCOUNTER
Called and lvm for pt follow up from urgent care appt needs to be mid to end of daniella   Conjuntivae and eyelids appear normal , Sclerae : White without injection

## 2024-12-28 ENCOUNTER — APPOINTMENT (OUTPATIENT)
Dept: GENERAL RADIOLOGY | Facility: HOSPITAL | Age: 74
End: 2024-12-28
Payer: MEDICARE

## 2024-12-28 ENCOUNTER — HOSPITAL ENCOUNTER (EMERGENCY)
Facility: HOSPITAL | Age: 74
Discharge: HOME | End: 2024-12-28
Attending: EMERGENCY MEDICINE
Payer: MEDICARE

## 2024-12-28 ENCOUNTER — APPOINTMENT (OUTPATIENT)
Dept: CT IMAGING | Facility: HOSPITAL | Age: 74
End: 2024-12-28
Payer: MEDICARE

## 2024-12-28 VITALS
BODY MASS INDEX: 26.83 KG/M2 | HEART RATE: 83 BPM | OXYGEN SATURATION: 92 % | RESPIRATION RATE: 16 BRPM | SYSTOLIC BLOOD PRESSURE: 141 MMHG | WEIGHT: 187.39 LBS | DIASTOLIC BLOOD PRESSURE: 82 MMHG | TEMPERATURE: 98.4 F | HEIGHT: 70 IN

## 2024-12-28 DIAGNOSIS — R05.9 COUGH, UNSPECIFIED TYPE: Primary | ICD-10-CM

## 2024-12-28 LAB
ALBUMIN SERPL-MCNC: 4.4 G/DL (ref 3.5–5.2)
ALBUMIN/GLOB SERPL: 1.2 G/DL
ALP SERPL-CCNC: 127 U/L (ref 39–117)
ALT SERPL W P-5'-P-CCNC: 28 U/L (ref 1–41)
ANION GAP SERPL CALCULATED.3IONS-SCNC: 11.9 MMOL/L (ref 5–15)
AST SERPL-CCNC: 20 U/L (ref 1–40)
BILIRUB SERPL-MCNC: 0.7 MG/DL (ref 0–1.2)
BUN SERPL-MCNC: 16 MG/DL (ref 8–23)
BUN/CREAT SERPL: 16.3 (ref 7–25)
CALCIUM SPEC-SCNC: 9.3 MG/DL (ref 8.6–10.5)
CHLORIDE SERPL-SCNC: 98 MMOL/L (ref 98–107)
CO2 SERPL-SCNC: 28.1 MMOL/L (ref 22–29)
CREAT SERPL-MCNC: 0.98 MG/DL (ref 0.76–1.27)
D-LACTATE SERPL-SCNC: 0.9 MMOL/L (ref 0.5–2)
DEPRECATED RDW RBC AUTO: 47.9 FL (ref 37–54)
EGFRCR SERPLBLD CKD-EPI 2021: 80.4 ML/MIN/1.73
EOSINOPHIL # BLD MANUAL: 0.21 10*3/MM3 (ref 0–0.4)
EOSINOPHIL NFR BLD MANUAL: 1 % (ref 0.3–6.2)
ERYTHROCYTE [DISTWIDTH] IN BLOOD BY AUTOMATED COUNT: 13.4 % (ref 12.3–15.4)
FLUAV SUBTYP SPEC NAA+PROBE: NOT DETECTED
FLUBV RNA ISLT QL NAA+PROBE: NOT DETECTED
GLOBULIN UR ELPH-MCNC: 3.7 GM/DL
GLUCOSE SERPL-MCNC: 96 MG/DL (ref 65–99)
HCT VFR BLD AUTO: 42.6 % (ref 37.5–51)
HGB BLD-MCNC: 13.8 G/DL (ref 13–17.7)
HOLD SPECIMEN: NORMAL
HOLD SPECIMEN: NORMAL
LIPASE SERPL-CCNC: 16 U/L (ref 13–60)
LYMPHOCYTES # BLD MANUAL: 17.19 10*3/MM3 (ref 0.7–3.1)
LYMPHOCYTES NFR BLD MANUAL: 3 % (ref 5–12)
MAGNESIUM SERPL-MCNC: 2.1 MG/DL (ref 1.6–2.4)
MCH RBC QN AUTO: 31.4 PG (ref 26.6–33)
MCHC RBC AUTO-ENTMCNC: 32.4 G/DL (ref 31.5–35.7)
MCV RBC AUTO: 97 FL (ref 79–97)
MONOCYTES # BLD: 0.64 10*3/MM3 (ref 0.1–0.9)
NEUTROPHILS # BLD AUTO: 3.44 10*3/MM3 (ref 1.7–7)
NEUTROPHILS NFR BLD MANUAL: 16 % (ref 42.7–76)
PLATELET # BLD AUTO: 135 10*3/MM3 (ref 140–450)
PMV BLD AUTO: 12 FL (ref 6–12)
POTASSIUM SERPL-SCNC: 4.2 MMOL/L (ref 3.5–5.2)
PROT SERPL-MCNC: 8.1 G/DL (ref 6–8.5)
RBC # BLD AUTO: 4.39 10*6/MM3 (ref 4.14–5.8)
RBC MORPH BLD: NORMAL
RSV RNA NPH QL NAA+NON-PROBE: DETECTED
S PYO AG THROAT QL: NEGATIVE
SARS-COV-2 RNA RESP QL NAA+PROBE: NOT DETECTED
SCAN SLIDE: NORMAL
SMALL PLATELETS BLD QL SMEAR: ABNORMAL
SMUDGE CELLS BLD QL SMEAR: ABNORMAL
SODIUM SERPL-SCNC: 138 MMOL/L (ref 136–145)
VARIANT LYMPHS NFR BLD MANUAL: 80 % (ref 19.6–45.3)
WBC NRBC COR # BLD AUTO: 21.49 10*3/MM3 (ref 3.4–10.8)
WHOLE BLOOD HOLD COAG: NORMAL
WHOLE BLOOD HOLD SPECIMEN: NORMAL

## 2024-12-28 PROCEDURE — 85007 BL SMEAR W/DIFF WBC COUNT: CPT | Performed by: EMERGENCY MEDICINE

## 2024-12-28 PROCEDURE — 83735 ASSAY OF MAGNESIUM: CPT

## 2024-12-28 PROCEDURE — 99285 EMERGENCY DEPT VISIT HI MDM: CPT

## 2024-12-28 PROCEDURE — 36415 COLL VENOUS BLD VENIPUNCTURE: CPT

## 2024-12-28 PROCEDURE — 83690 ASSAY OF LIPASE: CPT

## 2024-12-28 PROCEDURE — 94799 UNLISTED PULMONARY SVC/PX: CPT

## 2024-12-28 PROCEDURE — 25510000001 IOPAMIDOL PER 1 ML: Performed by: EMERGENCY MEDICINE

## 2024-12-28 PROCEDURE — 87880 STREP A ASSAY W/OPTIC: CPT

## 2024-12-28 PROCEDURE — 83605 ASSAY OF LACTIC ACID: CPT | Performed by: EMERGENCY MEDICINE

## 2024-12-28 PROCEDURE — 87637 SARSCOV2&INF A&B&RSV AMP PRB: CPT | Performed by: EMERGENCY MEDICINE

## 2024-12-28 PROCEDURE — 74177 CT ABD & PELVIS W/CONTRAST: CPT

## 2024-12-28 PROCEDURE — 87081 CULTURE SCREEN ONLY: CPT | Performed by: EMERGENCY MEDICINE

## 2024-12-28 PROCEDURE — 94640 AIRWAY INHALATION TREATMENT: CPT

## 2024-12-28 PROCEDURE — 96375 TX/PRO/DX INJ NEW DRUG ADDON: CPT

## 2024-12-28 PROCEDURE — 25010000002 METHYLPREDNISOLONE PER 125 MG: Performed by: EMERGENCY MEDICINE

## 2024-12-28 PROCEDURE — 85025 COMPLETE CBC W/AUTO DIFF WBC: CPT | Performed by: EMERGENCY MEDICINE

## 2024-12-28 PROCEDURE — 80053 COMPREHEN METABOLIC PANEL: CPT | Performed by: EMERGENCY MEDICINE

## 2024-12-28 PROCEDURE — 96365 THER/PROPH/DIAG IV INF INIT: CPT

## 2024-12-28 PROCEDURE — 71045 X-RAY EXAM CHEST 1 VIEW: CPT

## 2024-12-28 PROCEDURE — 25010000002 PIPERACILLIN SOD-TAZOBACTAM PER 1 G: Performed by: EMERGENCY MEDICINE

## 2024-12-28 RX ORDER — PREDNISONE 50 MG/1
50 TABLET ORAL DAILY
Qty: 5 TABLET | Refills: 0 | Status: SHIPPED | OUTPATIENT
Start: 2024-12-28

## 2024-12-28 RX ORDER — SODIUM CHLORIDE 0.9 % (FLUSH) 0.9 %
10 SYRINGE (ML) INJECTION AS NEEDED
Status: DISCONTINUED | OUTPATIENT
Start: 2024-12-28 | End: 2024-12-28 | Stop reason: HOSPADM

## 2024-12-28 RX ORDER — METHYLPREDNISOLONE SODIUM SUCCINATE 125 MG/2ML
125 INJECTION, POWDER, LYOPHILIZED, FOR SOLUTION INTRAMUSCULAR; INTRAVENOUS ONCE
Status: COMPLETED | OUTPATIENT
Start: 2024-12-28 | End: 2024-12-28

## 2024-12-28 RX ORDER — IOPAMIDOL 755 MG/ML
100 INJECTION, SOLUTION INTRAVASCULAR
Status: COMPLETED | OUTPATIENT
Start: 2024-12-28 | End: 2024-12-28

## 2024-12-28 RX ORDER — ALBUTEROL SULFATE 0.83 MG/ML
7.5 SOLUTION RESPIRATORY (INHALATION) CONTINUOUS
Status: DISCONTINUED | OUTPATIENT
Start: 2024-12-28 | End: 2024-12-28 | Stop reason: HOSPADM

## 2024-12-28 RX ORDER — DOXYCYCLINE 100 MG/1
100 CAPSULE ORAL 2 TIMES DAILY
Qty: 20 CAPSULE | Refills: 0 | Status: SHIPPED | OUTPATIENT
Start: 2024-12-28

## 2024-12-28 RX ORDER — BENZONATATE 200 MG/1
200 CAPSULE ORAL 3 TIMES DAILY PRN
Qty: 20 CAPSULE | Refills: 0 | Status: SHIPPED | OUTPATIENT
Start: 2024-12-28

## 2024-12-28 RX ADMIN — IOPAMIDOL 100 ML: 755 INJECTION, SOLUTION INTRAVENOUS at 18:36

## 2024-12-28 RX ADMIN — METHYLPREDNISOLONE SODIUM SUCCINATE 125 MG: 125 INJECTION, POWDER, FOR SOLUTION INTRAMUSCULAR; INTRAVENOUS at 19:56

## 2024-12-28 RX ADMIN — PIPERACILLIN AND TAZOBACTAM 3.38 G: 3; .375 INJECTION, POWDER, FOR SOLUTION INTRAVENOUS at 20:01

## 2024-12-28 RX ADMIN — ALBUTEROL SULFATE 7.5 MG: 2.5 SOLUTION RESPIRATORY (INHALATION) at 20:17

## 2024-12-28 NOTE — ED PROVIDER NOTES
Time: 5:00 PM EST  Date of encounter:  12/28/2024  Independent Historian/Clinical History and Information was obtained by:   Patient    History is limited by: N/A    Chief Complaint: Shortness of breath      History of Present Illness:  Patient is a 74 y.o. year old male who presents to the emergency department for evaluation of cough, headache, upper abdominal pain x x 12 days.  Patient has prior medical history consistent for COPD currently on Spiriva.  Patient reports he was seen in urgent care today and was sent over for further workup.  Patient reports he was treated for pneumonia last week with antibiotics but is still having ongoing symptoms.  Patient reports he is having new onset right quadrant abdominal pain.      Patient Care Team  Primary Care Provider: Garrett Harrison APRN    Past Medical History:     No Known Allergies  Past Medical History:   Diagnosis Date    Abnormal blood chemistry 09/17/2014    Elevated RBC count    Arthritis 09/16/2014    Arthropathy, unspecified     multiple sites    Cancer     Cervicogenic headache 08/09/2019    Emphysema of lung 02/09/2016    Heart attack     Hepatitis C     Hyperlipemia     Hyperlipidemia 09/16/2014    Hypertension 09/16/2014    Impaired fasting glucose 09/17/2014    Lung cancer     Lung disease     Lung nodule seen on imaging study 02/09/2016    Myocardial infarction     Shortness of breath     Tobacco abuse 02/09/2016     Past Surgical History:   Procedure Laterality Date    CARDIAC SURGERY      CARDIAC VALVE SURGERY      COLONOSCOPY      COLONOSCOPY N/A 4/17/2023    Procedure: COLONOSCOPY WITH POLYPECTOMIES, HOT SNARE, CLIP APPLICATION X2;  Surgeon: Tamri España MD;  Location: [facility];  Service: General;  Laterality: N/A;  COLON POLYPS    CORONARY STENT PLACEMENT      FINGER SURGERY      HAND SURGERY      HERNIA REPAIR      SKIN CANCER EXCISION      TONSILLECTOMY       Family History   Problem Relation Age of Onset    Cancer Mother      Diabetes Father     Arthritis Father     Cancer Father     Stroke Other     Breast cancer Other        Home Medications:  Prior to Admission medications    Medication Sig Start Date End Date Taking? Authorizing Provider   albuterol sulfate  (90 Base) MCG/ACT inhaler Inhale 2 puffs Every 4 (Four) Hours As Needed for Wheezing. 8/13/24   Garrett Harrison APRN   ascorbic acid (VITAMIN C) 1000 MG tablet Vitamin C 1,000 mg oral tablet take 1 tablet by oral route daily   Active    ProviderJames MD   aspirin 81 MG EC tablet Take 1 tablet by mouth Daily.  Patient taking differently: Take 1 tablet by mouth Every Other Day.    James Salguero MD   atorvastatin (LIPITOR) 40 MG tablet Take 1 tablet by mouth Every Night. 8/13/24   Garrett Harrison APRN   benzonatate (TESSALON) 100 MG capsule Take 1 capsule by mouth 3 (Three) Times a Day As Needed for Cough for up to 10 days. 12/18/24 12/28/24  Consuelo Pickard APRN   cholecalciferol (VITAMIN D3) 25 MCG (1000 UT) tablet Take 2 tablets by mouth Daily. 8/13/24   Garrett Harrison APRN   Cholecalciferol 50 MCG (2000 UT) tablet Take 1 tablet by mouth Daily. 8/13/24   Garrett Harrison APRN   Diclofenac Sodium (VOLTAREN) 1 % gel gel Apply 4 g topically to the appropriate area as directed 4 (Four) Times a Day As Needed (joint). 2/14/24   Garrett Harrison APRN   fluorouracil (EFUDEX) 5 % cream     James Salguero MD   gabapentin (NEURONTIN) 300 MG capsule Take 1 capsule by mouth 3 (Three) Times a Day. 8/13/24   Garrett Harrison APRN   isosorbide mononitrate (IMDUR) 60 MG 24 hr tablet  5/29/21   James Salguero MD   lidocaine (LIDODERM) 5 %     James Salguero MD   meclizine (ANTIVERT) 25 MG tablet Take 1 tablet by mouth 3 (Three) Times a Day As Needed for Dizziness. 8/13/24   Garrett Harrison APRN   metoprolol succinate XL (TOPROL-XL) 50 MG 24 hr tablet  5/29/21   Jose M  MD James   montelukast (SINGULAIR) 10 MG tablet Take 1 tablet by mouth Every Night. 24   Garrett Harrison APRN   MoviPrep 100 g reconstituted solution powder  3/28/23   James Salguero MD   nitroglycerin (NITROSTAT) 0.3 MG SL tablet     James Salguero MD   nitroglycerin (NITROSTAT) 0.4 MG SL tablet Place 1 tablet under the tongue. 24  James Salguero MD   promethazine-dextromethorphan (PROMETHAZINE-DM) 6.25-15 MG/5ML syrup Take 1 teaspoon each 4 to 6 hours as needed for cough.  Be aware that the medication can make you drowsy. 24   Consuelo Pickard APRN   simethicone (Gas-X) 80 MG chewable tablet Take 2 tablets PO after completing movi prep and 2 tablets PO 4 hours prior to procedure. 3/28/23   Shari Bauer APRN   Tiotropium Bromide Monohydrate (Spiriva Respimat) 1.25 MCG/ACT aerosol solution inhaler Inhale 2 puffs Daily. 24   Garrett Harrison APRN   azithromycin (Zithromax Z-Dank) 250 MG tablet Take 2 tablets by mouth on day 1, then 1 tablet daily on days 2-5 24  Consuelo Pickard APRN   Cetirizine HCl 10 MG capsule Take 10 mg by mouth every night at bedtime. 24  aGrrett Harrison APRN   fluticasone (FLONASE) 50 MCG/ACT nasal spray 2 sprays into the nostril(s) as directed by provider Daily. 2 sprays each nostril daily 24  Garrett Harrison APRN   tamsulosin (FLOMAX) 0.4 MG capsule 24 hr capsule Take 1 capsule by mouth Daily. 11/15/24 12/28/24  Garrett Harrison APRN        Social History:   Social History     Tobacco Use    Smoking status: Former     Current packs/day: 0.00     Average packs/day: 2.0 packs/day for 54.0 years (108.0 ttl pk-yrs)     Types: Cigarettes     Start date:      Quit date:      Years since quittin.9     Passive exposure: Never    Smokeless tobacco: Never   Vaping Use    Vaping status: Never Used   Substance Use Topics    Alcohol use: Never  "   Drug use: Never         Review of Systems:  Review of Systems   Constitutional:  Positive for fatigue. Negative for chills and fever.   HENT:  Positive for rhinorrhea, sinus pressure and sore throat. Negative for congestion.    Eyes:  Negative for pain and visual disturbance.   Respiratory:  Positive for cough and chest tightness. Negative for apnea and shortness of breath.    Cardiovascular:  Negative for chest pain and palpitations.   Gastrointestinal:  Positive for abdominal pain. Negative for diarrhea, nausea and vomiting.   Genitourinary:  Negative for difficulty urinating and dysuria.   Musculoskeletal:  Negative for joint swelling and myalgias.   Skin:  Negative for color change.   Neurological:  Negative for seizures and headaches.   Psychiatric/Behavioral: Negative.     All other systems reviewed and are negative.       Physical Exam:  /71   Pulse 74   Temp 97.8 °F (36.6 °C) (Oral)   Resp 16   Ht 177.8 cm (70\")   Wt 85 kg (187 lb 6.3 oz)   SpO2 90%   BMI 26.89 kg/m²     Physical Exam  Vitals and nursing note reviewed.   Constitutional:       General: He is not in acute distress.     Appearance: Normal appearance. He is not toxic-appearing.   HENT:      Head: Normocephalic and atraumatic.      Jaw: There is normal jaw occlusion.   Eyes:      General: Lids are normal.      Extraocular Movements: Extraocular movements intact.      Conjunctiva/sclera: Conjunctivae normal.      Pupils: Pupils are equal, round, and reactive to light.   Cardiovascular:      Rate and Rhythm: Normal rate and regular rhythm.      Pulses: Normal pulses.      Heart sounds: Normal heart sounds.   Pulmonary:      Effort: Pulmonary effort is normal. No respiratory distress.      Breath sounds: Normal breath sounds. No wheezing or rhonchi.   Abdominal:      General: Abdomen is flat.      Palpations: Abdomen is soft. There is mass.      Tenderness: There is abdominal tenderness. There is guarding. There is no rebound. "   Musculoskeletal:         General: Normal range of motion.      Cervical back: Normal range of motion and neck supple.      Right lower leg: No edema.      Left lower leg: No edema.   Skin:     General: Skin is warm and dry.   Neurological:      Mental Status: He is alert and oriented to person, place, and time. Mental status is at baseline.   Psychiatric:         Mood and Affect: Mood normal.                    Medical Decision Making:      Comorbidities that affect care:    Coronary artery disease    External Notes reviewed:    Previous Clinic Note: Patient was seen at urgent care today for dyspnea      The following orders were placed and all results were independently analyzed by me:  Orders Placed This Encounter   Procedures    COVID-19, FLU A/B, RSV PCR 1 HR TAT - Swab, Nasopharynx    Rapid Strep A Screen - Swab, Throat    Beta Strep Culture, Throat - Swab, Throat    XR Chest 1 View    CT Abdomen Pelvis With Contrast    Carroll Draw    Comprehensive Metabolic Panel    CBC Auto Differential    Lipase    Magnesium    Scan Slide    Manual Differential    Lactic Acid, Plasma    Inpatient Hospitalist Consult    Insert peripheral IV    CBC & Differential    Green Top (Gel)    Lavender Top    Gold Top - SST    Light Blue Top       Medications Given in the Emergency Department:  Medications   sodium chloride 0.9 % flush 10 mL (has no administration in time range)   albuterol (PROVENTIL) nebulizer solution 0.083% 2.5 mg/3mL (7.5 mg Nebulization New Bag 12/28/24 2017)   iopamidol (ISOVUE-370) 76 % injection 100 mL (100 mL Intravenous Given 12/28/24 1836)   methylPREDNISolone sodium succinate (SOLU-Medrol) injection 125 mg (125 mg Intravenous Given 12/28/24 1956)   piperacillin-tazobactam (ZOSYN) IVPB 3.375 g IVPB in 100 mL NS (VTB) (0 g Intravenous Stopped 12/28/24 2040)        ED Course:         Labs:    Lab Results (last 24 hours)       Procedure Component Value Units Date/Time    Covid-19 + Flu A&B AG, Veritor  (QAX0906) [[id number]]  (Normal) Collected: 12/28/24 1527    Specimen: Swab Updated: 12/28/24 1527     SARS Antigen Not Detected     Influenza A Antigen GALO Not Detected     Influenza B Antigen GALO Not Detected     Internal Control Passed     Lot Number [id number]     Expiration Date 10/18/2025    COVID-19, FLU A/B, RSV PCR 1 HR TAT - Swab, Nasopharynx [[id number]]  (Abnormal) Collected: 12/28/24 1701    Specimen: Swab from Nasopharynx Updated: 12/28/24 1759     COVID19 Not Detected     Influenza A PCR Not Detected     Influenza B PCR Not Detected     RSV, PCR Detected    Narrative:      Fact sheet for providers: https://www.fda.gov/media/689243/download    Fact sheet for patients: https://www.fda.gov/media/104724/download    Test performed by PCR.    Rapid Strep A Screen - Swab, Throat [[id number]]  (Normal) Collected: 12/28/24 1701    Specimen: Swab from Throat Updated: 12/28/24 1729     Strep A Ag Negative    Beta Strep Culture, Throat - Swab, Throat [[id number]] Collected: 12/28/24 1701    Specimen: Swab from Throat Updated: 12/28/24 1728    CBC & Differential [[id number]]  (Abnormal) Collected: 12/28/24 1731    Specimen: Blood Updated: 12/28/24 1854    Narrative:      The following orders were created for panel order CBC & Differential.  Procedure                               Abnormality         Status                     ---------                               -----------         ------                     CBC Auto Differential[[id number]]        Abnormal            Final result               Scan Slide[[id number]]                                       Final result                 Please view results for these tests on the individual orders.    Comprehensive Metabolic Panel [[id number]]  (Abnormal) Collected: 12/28/24 1731    Specimen: Blood Updated: 12/28/24 1809     Glucose 96 mg/dL      BUN 16 mg/dL      Creatinine 0.98 mg/dL      Sodium 138 mmol/L      Potassium 4.2 mmol/L      Chloride 98 mmol/L      CO2 28.1  mmol/L      Calcium 9.3 mg/dL      Total Protein 8.1 g/dL      Albumin 4.4 g/dL      ALT (SGPT) 28 U/L      AST (SGOT) 20 U/L      Alkaline Phosphatase 127 U/L      Total Bilirubin 0.7 mg/dL      Globulin 3.7 gm/dL      A/G Ratio 1.2 g/dL      BUN/Creatinine Ratio 16.3     Anion Gap 11.9 mmol/L      eGFR 80.4 mL/min/1.73     Narrative:      GFR Categories in Chronic Kidney Disease (CKD)      GFR Category          GFR (mL/min/1.73)    Interpretation  G1                     90 or greater         Normal or high (1)  G2                      60-89                Mild decrease (1)  G3a                   45-59                Mild to moderate decrease  G3b                   30-44                Moderate to severe decrease  G4                    15-29                Severe decrease  G5                    14 or less           Kidney failure          (1)In the absence of evidence of kidney disease, neither GFR category G1 or G2 fulfill the criteria for CKD.    eGFR calculation 2021 CKD-EPI creatinine equation, which does not include race as a factor    CBC Auto Differential [[id number]]  (Abnormal) Collected: 12/28/24 1731    Specimen: Blood Updated: 12/28/24 1854     WBC 21.49 10*3/mm3      RBC 4.39 10*6/mm3      Hemoglobin 13.8 g/dL      Hematocrit 42.6 %      MCV 97.0 fL      MCH 31.4 pg      MCHC 32.4 g/dL      RDW 13.4 %      RDW-SD 47.9 fl      MPV 12.0 fL      Platelets 135 10*3/mm3     Lipase [[id number]]  (Normal) Collected: 12/28/24 1731    Specimen: Blood Updated: 12/28/24 1809     Lipase 16 U/L     Magnesium [[id number]]  (Normal) Collected: 12/28/24 1731    Specimen: Blood Updated: 12/28/24 1809     Magnesium 2.1 mg/dL     Scan Slide [[id number]] Collected: 12/28/24 1731    Specimen: Blood Updated: 12/28/24 1854     Scan Slide --     Comment: See Manual Differential Results       Manual Differential [[id number]]  (Abnormal) Collected: 12/28/24 1731    Specimen: Blood Updated: 12/28/24 1854     Neutrophil % 16.0 %       Lymphocyte % 80.0 %      Monocyte % 3.0 %      Eosinophil % 1.0 %      Neutrophils Absolute 3.44 10*3/mm3      Lymphocytes Absolute 17.19 10*3/mm3      Monocytes Absolute 0.64 10*3/mm3      Eosinophils Absolute 0.21 10*3/mm3      RBC Morphology Normal     Smudge Cells Mod/2+     Platelet Estimate Decreased    Lactic Acid, Plasma [[id number]]  (Normal) Collected: 12/28/24 2006    Specimen: Blood Updated: 12/28/24 2029     Lactate 0.9 mmol/L              Imaging:    CT Abdomen Pelvis With Contrast    Result Date: 12/28/2024  CT ABDOMEN PELVIS W CONTRAST Date of Exam: 12/28/2024 6:36 PM EST Indication: Epigastric guarding and RUQ pain with mass appreciated on PE. Comparison: PET/CT 10/10/2023, CT AP 8/1/2019 Technique: Axial CT images were obtained of the abdomen and pelvis after the uneventful intravenous administration of iodinated contrast. Reconstructed coronal and sagittal images were also obtained. Automated exposure control and iterative construction methods were used. Findings: Moderate emphysema is seen at the lung bases. Peripheral reticulation with septal thickening may be a manifestation of UIP. The liver is of normal size and uniform density. The gallbladder is not abnormally distended. No pancreatic or adrenal mass is evident. The spleen is of normal size. The kidneys enhance bilaterally. No renal or ureteral stones are seen. There is no evidence of hydronephrosis. The urinary bladder is not abnormally distended. The prostate gland measures 5 cm in greatest transverse dimension. The stomach is not abnormally distended. Bowel loops are of normal caliber. The appendix is normal. A moderate amount of stool is seen in the colon. There are no findings of obstruction or impaction. Fusiform ectasia of the infrarenal abdominal aorta measures 2.5 cm in maximal diameter. The anterior abdominal wall is intact, no hernia is evident. Mild degenerative spurring is seen in the lower thoracic and lumbar spine.      Impression: CT scan of the abdomen and pelvis with IV contrast demonstrating no mass or adenopathy. The infrarenal abdominal aorta measures 2.5 cm in maximal diameter. Electronically Signed: Srini Nix MD  12/28/2024 6:55 PM EST  Workstation ID: [id number]    XR Chest 1 View    Result Date: 12/28/2024  XR CHEST 1 VW Date of Exam: 12/28/2024 5:26 PM EST Indication: cough x 2 weeks Comparison: Chest radiograph 12/18/2024 Findings: Radiographically similar chronic interstitial change related to known pulmonary emphysema. Biapical pleural-parenchymal scarring. Aortic atherosclerotic disease. Heart size normal. Negative for lobar consolidation, edema, effusion or pneumothorax. Degenerative related osseous change.     Impression: Emphysematous changes without convincing active pulmonary process. Electronically Signed: Antonino Cyr MD  12/28/2024 6:11 PM EST  Workstation ID: [id number]       Differential Diagnosis and Discussion:    Cough: Differential diagnosis includes but is not limited to pneumonia, acute bronchitis, upper respiratory infection, ACE inhibitor use, allergic reaction, epiglottitis, seasonal allergies, chemical irritants, exercise-induced asthma, viral syndrome.    PROCEDURES:    Labs were collected in the emergency department and all labs were reviewed and interpreted by me.  X-ray were performed in the emergency department and all X-ray impressions were independently interpreted by me.  An EKG was performed and the EKG was interpreted by me.    No orders to display       Procedures    MDM     The patient´s CBC that was reviewed and interpreted by me shows no abnormalities of critical concern.  Patient does have a elevated white count of 23,000.  Of note, there is no anemia requiring a blood transfusion and the platelet count is acceptable.  The patient´s CMP that was reviewed and interpretted by me shows no abnormalities of critical concern. Of note, the patient´s sodium and potassium are  acceptable. The patient´s liver enzymes are unremarkable. The patient´s renal function (creatinine) is preserved. The patient has a normal anion gap.  RSV swab is positive.  Chest x-ray is negative for acute infiltrate.  Patient was given an hour-long treatment and Solu-Medrol.                Patient Care Considerations:    None      Consultants/Shared Management Plan:    Case was discussed with Dr. Noe who came down to evaluate the patient.  He relayed to Dr. Noe that he would like to be discharged and go home.    Social Determinants of Health:    Patient is independent, reliable, and has access to care.       Disposition and Care Coordination:    I advised the patient that in my opinion through evaluation of the history, physical, and objective data admission to the hospital is warranted. However, the patient/caretaker has declined admission understanding the risks of discharge.    I have explained the patient´s condition, diagnoses and treatment plan based on the information available to me at this time. I have answered questions and addressed any concerns. The patient has a good  understanding of the patient´s diagnosis, condition, and treatment plan as can be expected at this point. The vital signs have been stable. The patient´s condition is stable and appropriate for discharge from the emergency department.      The patient will pursue further outpatient evaluation with the primary care physician or other designated or consulting physician as outlined in the discharge instructions. They are agreeable to this plan of care and follow-up instructions have been explained in detail. The patient has received these instructions in written format and has expressed an understanding of the discharge instructions. The patient is aware that any significant change in condition or worsening of symptoms should prompt an immediate return to this or the closest emergency department or call to 911.  I have explained  discharge medications and the need for follow up with the patient/caretakers. This was also printed in the discharge instructions. Patient was discharged with the following medications and follow up:      Medication List        New Prescriptions      doxycycline 100 MG capsule  Commonly known as: MONODOX  Take 1 capsule by mouth 2 (Two) Times a Day.     predniSONE 50 MG tablet  Commonly known as: DELTASONE  Take 1 tablet by mouth Daily.            Changed      aspirin 81 MG EC tablet  What changed: when to take this     benzonatate 200 MG capsule  Commonly known as: TESSALON  Take 1 capsule by mouth 3 (Three) Times a Day As Needed for Cough.  What changed:   medication strength  how much to take               Where to Get Your Medications        These medications were sent to [id number] - [place], [place] - [address] AT [facility] OF [address]/[facility] & [place] - [phone]  - [phone]   [address] [place] [address]      Phone: [phone]   benzonatate 200 MG capsule  doxycycline 100 MG capsule  predniSONE 50 MG tablet      Garrett Harrison, APRHILDA  [address] [place] [address] [phone]    In 2 days         Final diagnoses:   Cough, unspecified type        ED Disposition       ED Disposition   Discharge    Condition   Stable    Comment   --               This medical record created using voice recognition software.             Leonarda Do MD  12/28/24 3400

## 2024-12-30 ENCOUNTER — TELEPHONE (OUTPATIENT)
Dept: FAMILY MEDICINE CLINIC | Facility: CLINIC | Age: 74
End: 2024-12-30
Payer: MEDICARE

## 2024-12-30 DIAGNOSIS — R32 URINARY INCONTINENCE, UNSPECIFIED TYPE: Primary | ICD-10-CM

## 2024-12-30 RX ORDER — TAMSULOSIN HYDROCHLORIDE 0.4 MG/1
1 CAPSULE ORAL DAILY
Qty: 90 CAPSULE | Refills: 1 | Status: SHIPPED | OUTPATIENT
Start: 2024-12-30

## 2024-12-30 NOTE — TELEPHONE ENCOUNTER
ER visit on 12/28/24 follow up in 2 days - this is the soonest available on the schedule.    His wife is also very ill and cannot be scheduled until 1/28/25:    Bogdan Sanchez  Female, 84 y.o., 06/10/1940  [phone]  MRN: [id number]      Can you see them before these dates?

## 2024-12-31 LAB — BACTERIA SPEC AEROBE CULT: NORMAL

## 2025-01-13 NOTE — PROGRESS NOTES
Follow Up Office Visit      Patient Name: Kamron Sanchez  : 1950   MRN: [id number]     Chief Complaint:  cough      Pt c/o continued cough     History of Present Illness: Kamron Sanchez is a 74 y.o. male who is here today to follow up for ER     Date of encounter:  2024       Chief Complaint: Shortness of breath        History of Present Illness:  Patient is a 74 y.o. year old male who presents to the emergency department for evaluation of cough, headache, upper abdominal pain x x 12 days.  Patient has prior medical history consistent for COPD currently on Spiriva.  Patient reports he was seen in urgent care today and was sent over for further workup.  Patient reports he was treated for pneumonia last week with antibiotics but is still having ongoing symptoms.  Patient reports he is having new onset right quadrant abdominal pain.     MDM     The patient´s CBC that was reviewed and interpreted by me shows no abnormalities of critical concern.  Patient does have a elevated white count of 23,000.  Of note, there is no anemia requiring a blood transfusion and the platelet count is acceptable.  The patient´s CMP that was reviewed and interpretted by me shows no abnormalities of critical concern. Of note, the patient´s sodium and potassium are acceptable. The patient´s liver enzymes are unremarkable. The patient´s renal function (creatinine) is preserved. The patient has a normal anion gap.  RSV swab is positive.  Chest x-ray is negative for acute infiltrate.  Patient was given an hour-long treatment and Solu-Medrol.     CT abdomen and pelvis   Impression:  CT scan of the abdomen and pelvis with IV contrast demonstrating no mass or adenopathy.     The infrarenal abdominal aorta measures 2.5 cm in maximal diameter.     Electronically Signed: Srini Nix MD    2024 6:55 PM EST        Chest x-ray  Impression:  Emphysematous changes without convincing active pulmonary process.        Electronically  Signed: Antonino Cyr MD    12/28/2024 6:11 PM EST     Subjective      Review of Systems:   Review of Systems   Constitutional:  Negative for fever.   HENT:  Negative for ear pain and sore throat.    Eyes:  Negative for discharge.   Respiratory:  Positive for cough and shortness of breath.    Cardiovascular:  Negative for chest pain.   Neurological:  Negative for headaches.        Past Medical History:   Past Medical History:   Diagnosis Date    Abnormal blood chemistry 09/17/2014    Elevated RBC count    Arthritis 09/16/2014    Arthropathy, unspecified     multiple sites    Cancer     Cervicogenic headache 08/09/2019    Emphysema of lung 02/09/2016    Heart attack     Hepatitis C     Hyperlipemia     Hyperlipidemia 09/16/2014    Hypertension 09/16/2014    Impaired fasting glucose 09/17/2014    Lung cancer     Lung disease     Lung nodule seen on imaging study 02/09/2016    Myocardial infarction     Shortness of breath     Tobacco abuse 02/09/2016       Past Surgical History:   Past Surgical History:   Procedure Laterality Date    CARDIAC SURGERY      CARDIAC VALVE SURGERY      COLONOSCOPY      COLONOSCOPY N/A 4/17/2023    Procedure: COLONOSCOPY WITH POLYPECTOMIES, HOT SNARE, CLIP APPLICATION X2;  Surgeon: Tamir España MD;  Location: [facility];  Service: General;  Laterality: N/A;  COLON POLYPS    CORONARY STENT PLACEMENT      FINGER SURGERY      HAND SURGERY      HERNIA REPAIR      SKIN CANCER EXCISION      TONSILLECTOMY         Family History:   Family History   Problem Relation Age of Onset    Cancer Mother     Diabetes Father     Arthritis Father     Cancer Father     Stroke Other     Breast cancer Other        Social History:   Social History     Socioeconomic History    Marital status:    Tobacco Use    Smoking status: Former     Current packs/day: 0.00     Average packs/day: 2.0 packs/day for 54.0 years (108.0 ttl pk-yrs)     Types: Cigarettes     Start date: 1961     Quit date: 2015      Years since quitting: 10.0     Passive exposure: Never    Smokeless tobacco: Never   Vaping Use    Vaping status: Never Used   Substance and Sexual Activity    Alcohol use: Never    Drug use: Never    Sexual activity: Defer       Medications:     Current Outpatient Medications:     albuterol sulfate  (90 Base) MCG/ACT inhaler, Inhale 2 puffs Every 4 (Four) Hours As Needed for Wheezing., Disp: 18 g, Rfl: 2    ascorbic acid (VITAMIN C) 1000 MG tablet, Vitamin C 1,000 mg oral tablet take 1 tablet by oral route daily   Active, Disp: , Rfl:     aspirin 81 MG EC tablet, Take 1 tablet by mouth Daily. (Patient taking differently: Take 1 tablet by mouth Every Other Day.), Disp: , Rfl:     atorvastatin (LIPITOR) 40 MG tablet, Take 1 tablet by mouth Every Night., Disp: 90 tablet, Rfl: 1    cholecalciferol (VITAMIN D3) 25 MCG (1000 UT) tablet, Take 2 tablets by mouth Daily., Disp: 180 tablet, Rfl: 0    Cholecalciferol 50 MCG (2000 UT) tablet, Take 1 tablet by mouth Daily., Disp: 90 each, Rfl: 1    gabapentin (NEURONTIN) 300 MG capsule, Take 1 capsule by mouth 3 (Three) Times a Day., Disp: 270 capsule, Rfl: 1    isosorbide mononitrate (IMDUR) 60 MG 24 hr tablet, , Disp: , Rfl:     lidocaine (LIDODERM) 5 %, , Disp: , Rfl:     meclizine (ANTIVERT) 25 MG tablet, Take 1 tablet by mouth 3 (Three) Times a Day As Needed for Dizziness., Disp: 270 tablet, Rfl: 1    metoprolol succinate XL (TOPROL-XL) 50 MG 24 hr tablet, , Disp: , Rfl:     montelukast (SINGULAIR) 10 MG tablet, Take 1 tablet by mouth Every Night., Disp: 90 tablet, Rfl: 1    nitroglycerin (NITROSTAT) 0.4 MG SL tablet, Place 1 tablet under the tongue., Disp: , Rfl:     tamsulosin (FLOMAX) 0.4 MG capsule 24 hr capsule, Take 1 capsule by mouth Daily., Disp: 90 capsule, Rfl: 1    Tiotropium Bromide Monohydrate (Spiriva Respimat) 1.25 MCG/ACT aerosol solution inhaler, Inhale 2 puffs Daily., Disp: 3 each, Rfl: 1    benzonatate (TESSALON) 200 MG capsule, Take 1 capsule by  "mouth 3 (Three) Times a Day As Needed for Cough. (Patient not taking: Reported on 1/15/2025), Disp: 20 capsule, Rfl: 0    Diclofenac Sodium (VOLTAREN) 1 % gel gel, Apply 4 g topically to the appropriate area as directed 4 (Four) Times a Day As Needed (joint). (Patient not taking: Reported on 1/15/2025), Disp: 350 g, Rfl: 1    doxycycline (MONODOX) 100 MG capsule, Take 1 capsule by mouth 2 (Two) Times a Day. (Patient not taking: Reported on 1/15/2025), Disp: 20 capsule, Rfl: 0    fluorouracil (EFUDEX) 5 % cream, , Disp: , Rfl:     MoviPrep 100 g reconstituted solution powder, , Disp: , Rfl:     nitroglycerin (NITROSTAT) 0.3 MG SL tablet, , Disp: , Rfl:     promethazine-dextromethorphan (PROMETHAZINE-DM) 6.25-15 MG/5ML syrup, Take 1 teaspoon each 4 to 6 hours as needed for cough.  Be aware that the medication can make you drowsy. (Patient not taking: Reported on 1/15/2025), Disp: 150 mL, Rfl: 0    simethicone (Gas-X) 80 MG chewable tablet, Take 2 tablets PO after completing movi prep and 2 tablets PO 4 hours prior to procedure. (Patient not taking: Reported on 1/15/2025), Disp: 4 tablet, Rfl: 0    Current Facility-Administered Medications:     methylPREDNISolone acetate (DEPO-medrol) injection 40 mg, 40 mg, Intramuscular, Once, Garrett Harrison APRN    Allergies:   No Known Allergies        Objective     Physical Exam:  Vital Signs:   Vitals:    01/15/25 1116   BP: 113/67   Pulse: 67   Temp: 97.4 °F (36.3 °C)   SpO2: 98%   Weight: 88.1 kg (194 lb 3.2 oz)   Height: 177.8 cm (70\")     Body mass index is 27.86 kg/m².           Physical Exam  HENT:      Right Ear: Tympanic membrane normal.      Left Ear: Tympanic membrane normal.      Nose: Nose normal.      Mouth/Throat:      Mouth: Mucous membranes are moist.   Eyes:      Conjunctiva/sclera: Conjunctivae normal.   Cardiovascular:      Rate and Rhythm: Normal rate and regular rhythm.   Pulmonary:      Effort: Pulmonary effort is normal.      Breath sounds: " "Rhonchi present.      Comments: Rhonchi left lower lobe   Lymphadenopathy:      Cervical: No cervical adenopathy.   Skin:     General: Skin is warm and dry.   Neurological:      Mental Status: He is alert.   Psychiatric:         Mood and Affect: Mood normal.             Assessment / Plan      Assessment/Plan:   Diagnoses and all orders for this visit:    1. Pulmonary emphysema, unspecified emphysema type (Primary)  -     albuterol sulfate  (90 Base) MCG/ACT inhaler; Inhale 2 puffs Every 4 (Four) Hours As Needed for Wheezing.  Dispense: 18 g; Refill: 2  -     Tiotropium Bromide Monohydrate (Spiriva Respimat) 1.25 MCG/ACT aerosol solution inhaler; Inhale 2 puffs Daily.  Dispense: 3 each; Refill: 1    2. Acute cough  -     XR Chest PA & Lateral; Future  -     methylPREDNISolone acetate (DEPO-medrol) injection 40 mg    3. Leukocytosis, unspecified type  -     CBC Auto Differential    4. Shortness of breath  -     XR Chest PA & Lateral; Future         Pulmonary emphysema will refer to refill of Spiriva and albuterol  Acute cough with shortness of breath leukocytosis will obtain chest x-ray repeat CBC for monitoring provide Depo-Medrol 40 will call with chest x-ray results and further recommendations      Follow Up:   Return if symptoms worsen or fail to improve.    Julienne Harrison, APRN    \"Please note that portions of this note were completed with a voice recognition program.\"    "

## 2025-01-15 ENCOUNTER — HOSPITAL ENCOUNTER (OUTPATIENT)
Dept: GENERAL RADIOLOGY | Facility: HOSPITAL | Age: 75
Discharge: HOME | End: 2025-01-15
Admitting: NURSE PRACTITIONER
Payer: MEDICARE

## 2025-01-15 ENCOUNTER — OFFICE VISIT (OUTPATIENT)
Dept: FAMILY MEDICINE CLINIC | Facility: CLINIC | Age: 75
End: 2025-01-15
Payer: MEDICARE

## 2025-01-15 VITALS
SYSTOLIC BLOOD PRESSURE: 113 MMHG | HEART RATE: 67 BPM | DIASTOLIC BLOOD PRESSURE: 67 MMHG | BODY MASS INDEX: 27.8 KG/M2 | OXYGEN SATURATION: 98 % | WEIGHT: 194.2 LBS | TEMPERATURE: 97.4 F | HEIGHT: 70 IN

## 2025-01-15 DIAGNOSIS — R06.02 SHORTNESS OF BREATH: ICD-10-CM

## 2025-01-15 DIAGNOSIS — R05.1 ACUTE COUGH: ICD-10-CM

## 2025-01-15 DIAGNOSIS — J43.9 PULMONARY EMPHYSEMA, UNSPECIFIED EMPHYSEMA TYPE: Primary | ICD-10-CM

## 2025-01-15 DIAGNOSIS — D72.829 LEUKOCYTOSIS, UNSPECIFIED TYPE: ICD-10-CM

## 2025-01-15 LAB
DEPRECATED RDW RBC AUTO: 44.4 FL (ref 37–54)
ERYTHROCYTE [DISTWIDTH] IN BLOOD BY AUTOMATED COUNT: 12.8 % (ref 12.3–15.4)
HCT VFR BLD AUTO: 38.1 % (ref 37.5–51)
HGB BLD-MCNC: 13 G/DL (ref 13–17.7)
LYMPHOCYTES # BLD MANUAL: 18.48 10*3/MM3 (ref 0.7–3.1)
LYMPHOCYTES NFR BLD MANUAL: 5 % (ref 5–12)
MCH RBC QN AUTO: 32.5 PG (ref 26.6–33)
MCHC RBC AUTO-ENTMCNC: 34.1 G/DL (ref 31.5–35.7)
MCV RBC AUTO: 95.3 FL (ref 79–97)
MONOCYTES # BLD: 1.23 10*3/MM3 (ref 0.1–0.9)
NEUTROPHILS # BLD AUTO: 4.93 10*3/MM3 (ref 1.7–7)
NEUTROPHILS NFR BLD MANUAL: 20 % (ref 42.7–76)
PLAT MORPH BLD: NORMAL
PLATELET # BLD AUTO: 91 10*3/MM3 (ref 140–450)
PMV BLD AUTO: 12.1 FL (ref 6–12)
RBC # BLD AUTO: 4 10*6/MM3 (ref 4.14–5.8)
RBC MORPH BLD: NORMAL
SMUDGE CELLS BLD QL SMEAR: ABNORMAL
VARIANT LYMPHS NFR BLD MANUAL: 75 % (ref 19.6–45.3)
WBC NRBC COR # BLD AUTO: 24.64 10*3/MM3 (ref 3.4–10.8)

## 2025-01-15 PROCEDURE — 96372 THER/PROPH/DIAG INJ SC/IM: CPT | Performed by: NURSE PRACTITIONER

## 2025-01-15 PROCEDURE — 71046 X-RAY EXAM CHEST 2 VIEWS: CPT

## 2025-01-15 PROCEDURE — 85025 COMPLETE CBC W/AUTO DIFF WBC: CPT | Performed by: NURSE PRACTITIONER

## 2025-01-15 PROCEDURE — 36415 COLL VENOUS BLD VENIPUNCTURE: CPT | Performed by: NURSE PRACTITIONER

## 2025-01-15 PROCEDURE — 99213 OFFICE O/P EST LOW 20 MIN: CPT | Performed by: NURSE PRACTITIONER

## 2025-01-15 PROCEDURE — 3074F SYST BP LT 130 MM HG: CPT | Performed by: NURSE PRACTITIONER

## 2025-01-15 PROCEDURE — 3078F DIAST BP <80 MM HG: CPT | Performed by: NURSE PRACTITIONER

## 2025-01-15 PROCEDURE — 1126F AMNT PAIN NOTED NONE PRSNT: CPT | Performed by: NURSE PRACTITIONER

## 2025-01-15 RX ORDER — METHYLPREDNISOLONE 4 MG/1
TABLET ORAL
Qty: 1 EACH | Refills: 0 | Status: SHIPPED | OUTPATIENT
Start: 2025-01-15 | End: 2025-01-16

## 2025-01-15 RX ORDER — TIOTROPIUM BROMIDE INHALATION SPRAY 1.56 UG/1
2 SPRAY, METERED RESPIRATORY (INHALATION)
Qty: 3 EACH | Refills: 1 | Status: SHIPPED | OUTPATIENT
Start: 2025-01-15

## 2025-01-15 RX ORDER — METHYLPREDNISOLONE ACETATE 40 MG/ML
40 INJECTION, SUSPENSION INTRA-ARTICULAR; INTRALESIONAL; INTRAMUSCULAR; SOFT TISSUE ONCE
Status: COMPLETED | OUTPATIENT
Start: 2025-01-15 | End: 2025-01-15

## 2025-01-15 RX ORDER — ALBUTEROL SULFATE 90 UG/1
2 INHALANT RESPIRATORY (INHALATION) EVERY 4 HOURS PRN
Qty: 18 G | Refills: 2 | Status: SHIPPED | OUTPATIENT
Start: 2025-01-15

## 2025-01-15 RX ADMIN — METHYLPREDNISOLONE ACETATE 40 MG: 40 INJECTION, SUSPENSION INTRA-ARTICULAR; INTRALESIONAL; INTRAMUSCULAR; SOFT TISSUE at 12:07

## 2025-01-16 ENCOUNTER — OFFICE VISIT (OUTPATIENT)
Dept: SLEEP MEDICINE | Facility: HOSPITAL | Age: 75
End: 2025-01-16
Payer: MEDICARE

## 2025-01-16 VITALS
OXYGEN SATURATION: 97 % | HEIGHT: 71 IN | WEIGHT: 190 LBS | SYSTOLIC BLOOD PRESSURE: 132 MMHG | HEART RATE: 58 BPM | DIASTOLIC BLOOD PRESSURE: 76 MMHG | BODY MASS INDEX: 26.6 KG/M2

## 2025-01-16 DIAGNOSIS — G47.33 OSA (OBSTRUCTIVE SLEEP APNEA): Primary | ICD-10-CM

## 2025-01-16 DIAGNOSIS — E66.3 OVERWEIGHT WITH BODY MASS INDEX (BMI) 25.0-29.9: ICD-10-CM

## 2025-01-16 PROCEDURE — G0463 HOSPITAL OUTPT CLINIC VISIT: HCPCS

## 2025-01-16 RX ORDER — CETIRIZINE HYDROCHLORIDE 10 MG/1
TABLET ORAL
COMMUNITY
Start: 2024-11-14

## 2025-01-16 NOTE — PROGRESS NOTES
"Follow Up Sleep Disorders Center Note     Chief Complaint:  DAYTON     Primary Care Physician: Garrett Harrison APRN    Interval History:   The patient is a 74 y.o. male  who was last seen in the sleep lab: 1/11/2024.  Presents today for follow-up of DAYTON.  2015 testing showed RDI 15.9 AHI 10.6.  Patient is on auto CPAP 11-15.  No problems with mask machine hypersomnia nonrestorative sleep.      Downloaded PAP Data Reviewed For Compliance:  DME is [employer].  Downloads between 12/17/2024-1 15 2025.  Average usage is 6 hours 36 minutes.  Average AHI is 1.4.  Average auto CPAP pressure is 11.9 cm H2O    I have reviewed the above results and compared them with the patient's last downloads and reviewed with the patient.    Review of Systems:    A complete review of systems was done and all were negative with the exception of dry mouth shortness of breath    Social History:    Social History     Socioeconomic History    Marital status:    Tobacco Use    Smoking status: Former     Current packs/day: 0.00     Average packs/day: 2.0 packs/day for 54.0 years (108.0 ttl pk-yrs)     Types: Cigarettes     Start date: 1961     Quit date: 2015     Years since quitting: 10.0     Passive exposure: Never    Smokeless tobacco: Never   Vaping Use    Vaping status: Never Used   Substance and Sexual Activity    Alcohol use: Never    Drug use: Never    Sexual activity: Defer       Allergies:  Patient has no known allergies.     Medication Review:  Reviewed.      Vital Signs:    Vitals:    01/16/25 0900   BP: 132/76   BP Location: Left arm   Patient Position: Sitting   Pulse: 58   SpO2: 97%   Weight: 86.2 kg (190 lb)   Height: 180.3 cm (70.98\")     Body mass index is 26.51 kg/m².    Physical Exam:    Constitutional:  Well developed 74 y.o. male that appears in no apparent distress.  Awake & oriented times 3.  Normal mood with normal recent and remote memory and normal judgement.  Eyes:  Conjunctivae normal.  Oropharynx: " Previously, moist mucous membranes.    Self-administered [place] Sleepiness Scale test results:  8  0-5 Lower normal daytime sleepiness  6-10 Higher normal daytime sleepiness  11-12 Mild, 13-15 Moderate, & 16-24 Severe excessive daytime sleepiness    Impression:   1. DAYTON (obstructive sleep apnea)    2. Overweight with body mass index (BMI) 25.0-29.9        Obstructive sleep apnea adequately treated with auto CPAP 11-15 cm H2O. The patient appears to be at goal with good compliance and usage. The patient has no complaints of hypersomnolence.    Plan:  Good sleep hygiene measures should be maintained.  Weight loss would be beneficial in this patient who is overweight by Body mass index is 26.51 kg/m²..      After evaluating the patient and assessing results available, the patient is benefiting from the treatment being provided.     The patient will continue auto CPAP.  After clinical evaluation and review of downloads, I recommend no changes to the patient's pressures.  A new prescription will be sent to the patient's DME.    Caution during activities that require prolonged concentration is strongly advised if sleepiness returns. Changing of PAP supplies regularly is important for effective use. Patient needs to change cushion on the mask or plugs on nasal pillows along with disposable filters once every month and change mask frame, tubing, headgear and Velcro straps every 6 months at the minimum.    I answered all of the patient's questions.  The patient will call for any problems and will follow up in 1 year.      Quinton Daugherty MD  Sleep Medicine  01/16/25  10:14 EST

## 2025-02-10 NOTE — PROGRESS NOTES
Follow Up Office Visit      Patient Name: Kamron Sanchez  : 1950   MRN: [id number]     Chief Complaint:    Chief Complaint   Patient presents with    Coronary Artery Disease    Hypertension    Insomnia    COPD         History of Present Illness: Kamron Sanchez is a 74 y.o. male who is here today to follow up for Hypertension, impaired fasting glucose, copd, Coronary Artery Disease, and Insomnia  Review lab results    Requests a refill of gabapentin chronic neck pain    Psa- 2022  cologard 2023 positive, colonoscopy 4.  Ct chest low dose  10/2024    Follow up sleep center 25  Impression:   1. DAYTON (obstructive sleep apnea)    2. Overweight with body mass index (BMI) 25.0-29.9          Obstructive sleep apnea adequately treated with auto CPAP 11-15 cm H2O. The patient appears to be at goal with good compliance and usage. The patient has no complaints of hypersomnolence.     Plan:  Good sleep hygiene measures should be maintained.  Weight loss would be beneficial in this patient who is overweight by Body mass index is 26.51 kg/m²..       After evaluating the patient and assessing results available, the patient is benefiting from the treatment being provided.      The patient will continue auto CPAP.  After clinical evaluation and review of downloads, I recommend no changes to the patient's pressures.  A new prescription will be sent to the patient's DME.     Caution during activities that require prolonged concentration is strongly advised if sleepiness returns. Changing of PAP supplies regularly is important for effective use. Patient needs to change cushion on the mask or plugs on nasal pillows along with disposable filters once every month and change mask frame, tubing, headgear and Velcro straps every 6 months at the minimum.   I answered all of the patient's questions.  The patient will call for any problems and will follow up in 1 year.               Subjective      Review of Systems:    Review of Systems   Constitutional:  Negative for fever.   HENT:  Negative for ear pain and sore throat.    Respiratory:  Negative for cough.    Cardiovascular:  Negative for chest pain.   Gastrointestinal:  Negative for abdominal pain.   Genitourinary:  Negative for dysuria.   Musculoskeletal:  Negative for myalgias.   Neurological:  Negative for headaches.        Past Medical History:   Past Medical History:   Diagnosis Date    Abnormal blood chemistry 09/17/2014    Elevated RBC count    Arthritis 09/16/2014    Arthropathy, unspecified     multiple sites    Cancer     Cataract fragments in left eye following surgery 01/10/2025    Cervicogenic headache 08/09/2019    Emphysema of lung 02/09/2016    Heart attack     Hepatitis C     Hyperlipemia     Hyperlipidemia 09/16/2014    Hypertension 09/16/2014    Impaired fasting glucose 09/17/2014    Lung cancer     Lung disease     Lung nodule seen on imaging study 02/09/2016    Myocardial infarction     Shortness of breath     Tobacco abuse 02/09/2016       Past Surgical History:   Past Surgical History:   Procedure Laterality Date    CARDIAC SURGERY      CARDIAC VALVE SURGERY      COLONOSCOPY      COLONOSCOPY N/A 4/17/2023    Procedure: COLONOSCOPY WITH POLYPECTOMIES, HOT SNARE, CLIP APPLICATION X2;  Surgeon: Tamir España MD;  Location: [facility];  Service: General;  Laterality: N/A;  COLON POLYPS    CORONARY STENT PLACEMENT      FINGER SURGERY      HAND SURGERY      HERNIA REPAIR      SKIN CANCER EXCISION      TONSILLECTOMY         Family History:   Family History   Problem Relation Age of Onset    Cancer Mother     Diabetes Father     Arthritis Father     Cancer Father     Stroke Other     Breast cancer Other        Social History:   Social History     Socioeconomic History    Marital status:    Tobacco Use    Smoking status: Former     Current packs/day: 0.00     Average packs/day: 2.0 packs/day for 54.0 years (108.0 ttl pk-yrs)     Types:  Cigarettes     Start date: 1961     Quit date: 2015     Years since quitting: 10.1     Passive exposure: Never    Smokeless tobacco: Never   Vaping Use    Vaping status: Never Used   Substance and Sexual Activity    Alcohol use: Never    Drug use: Never    Sexual activity: Defer       Medications:     Current Outpatient Medications:     gabapentin (NEURONTIN) 300 MG capsule, Take 1 capsule by mouth 3 (Three) Times a Day., Disp: 270 capsule, Rfl: 1    montelukast (SINGULAIR) 10 MG tablet, Take 1 tablet by mouth Every Night., Disp: 90 tablet, Rfl: 1    albuterol sulfate  (90 Base) MCG/ACT inhaler, Inhale 2 puffs Every 4 (Four) Hours As Needed for Wheezing., Disp: 18 g, Rfl: 2    ascorbic acid (VITAMIN C) 1000 MG tablet, Vitamin C 1,000 mg oral tablet take 1 tablet by oral route daily   Active, Disp: , Rfl:     atorvastatin (LIPITOR) 40 MG tablet, Take 1 tablet by mouth Every Night., Disp: 90 tablet, Rfl: 1    benzonatate (TESSALON) 200 MG capsule, Take 1 capsule by mouth 3 (Three) Times a Day As Needed for Cough. (Patient not taking: Reported on 2/13/2025), Disp: 20 capsule, Rfl: 0    cetirizine (zyrTEC) 10 MG tablet, , Disp: , Rfl:     cholecalciferol (VITAMIN D3) 25 MCG (1000 UT) tablet, Take 2 tablets by mouth Daily., Disp: 180 tablet, Rfl: 0    Cholecalciferol 50 MCG (2000 UT) tablet, Take 1 tablet by mouth Daily., Disp: 90 each, Rfl: 1    fluticasone (FLONASE) 50 MCG/ACT nasal spray, Administer 2 sprays into the nostril(s) as directed by provider Daily. 2 sprays each nostril daily, Disp: 16 g, Rfl: 1    isosorbide mononitrate (IMDUR) 60 MG 24 hr tablet, , Disp: , Rfl:     lidocaine (LIDODERM) 5 %, , Disp: , Rfl:     meclizine (ANTIVERT) 25 MG tablet, Take 1 tablet by mouth 3 (Three) Times a Day As Needed for Dizziness., Disp: 270 tablet, Rfl: 1    metoprolol succinate XL (TOPROL-XL) 50 MG 24 hr tablet, , Disp: , Rfl:     nitroglycerin (NITROSTAT) 0.3 MG SL tablet, , Disp: , Rfl:     nitroglycerin  "(NITROSTAT) 0.4 MG SL tablet, Place 1 tablet under the tongue., Disp: , Rfl:     tamsulosin (FLOMAX) 0.4 MG capsule 24 hr capsule, Take 1 capsule by mouth Daily., Disp: 90 capsule, Rfl: 1    Tiotropium Bromide Monohydrate (Spiriva Respimat) 1.25 MCG/ACT aerosol solution inhaler, Inhale 2 puffs Daily., Disp: 3 each, Rfl: 1    Allergies:   No Known Allergies            Objective     Physical Exam:  Vital Signs:   Vitals:    02/13/25 0756   BP: 119/78   Pulse: 62   Temp: 97.6 °F (36.4 °C)   SpO2: 94%   Weight: 88.5 kg (195 lb 3.2 oz)   Height: 177.8 cm (70\")   PainSc: 0-No pain     Body mass index is 28.01 kg/m².   BMI is >= 30 and <35. (Class 1 Obesity). The following options were offered after discussion;: exercise counseling/recommendations and nutrition counseling/recommendations       Physical Exam  HENT:      Right Ear: Tympanic membrane normal.      Left Ear: Tympanic membrane normal.      Nose: Nose normal.      Mouth/Throat:      Mouth: Mucous membranes are moist.   Eyes:      Conjunctiva/sclera: Conjunctivae normal.   Neck:      Vascular: No carotid bruit.   Cardiovascular:      Rate and Rhythm: Normal rate and regular rhythm.      Heart sounds: Normal heart sounds. No murmur heard.  Pulmonary:      Effort: Pulmonary effort is normal.      Breath sounds: Normal breath sounds.   Abdominal:      General: Bowel sounds are normal.   Musculoskeletal:      Right lower leg: No edema.      Left lower leg: No edema.   Lymphadenopathy:      Cervical: No cervical adenopathy.   Skin:     General: Skin is warm and dry.   Neurological:      Mental Status: He is alert.   Psychiatric:         Mood and Affect: Mood normal.         Behavior: Behavior normal.           Assessment / Plan      Assessment/Plan:   Diagnoses and all orders for this visit:    1. Primary hypertension (Primary)  -     CBC Auto Differential    2. Mixed hyperlipidemia  -     Comprehensive Metabolic Panel  -     Lipid Panel    3. Impaired fasting " "glucose  -     Comprehensive Metabolic Panel  -     Hemoglobin A1c  -     Microalbumin / Creatinine Urine Ratio - Urine, Clean Catch  -     Urinalysis With Culture If Indicated -    4. Primary insomnia    5. Pulmonary emphysema, unspecified emphysema type    6. Vitamin D deficiency  -     Vitamin D,25-Hydroxy    7. Screening for thyroid disorder  -     TSH    8. DDD (degenerative disc disease), cervical  -     gabapentin (NEURONTIN) 300 MG capsule; Take 1 capsule by mouth 3 (Three) Times a Day.  Dispense: 270 capsule; Refill: 1    9. Cervico-occipital neuralgia  -     gabapentin (NEURONTIN) 300 MG capsule; Take 1 capsule by mouth 3 (Three) Times a Day.  Dispense: 270 capsule; Refill: 1    10. Medication management  -     Urine Drug Screen - Urine, Clean Catch; Future  -     Urine Drug Screen - Urine, Clean Catch    Other orders  -     montelukast (SINGULAIR) 10 MG tablet; Take 1 tablet by mouth Every Night.  Dispense: 90 tablet; Refill: 1  -     fluticasone (FLONASE) 50 MCG/ACT nasal spray; Administer 2 sprays into the nostril(s) as directed by provider Daily. 2 sprays each nostril daily  Dispense: 16 g; Refill: 1       Hypertension currently controlled Toprol XL  Hyperlipidemia obtain lipid panel CMP to monitor current statin dose Lipitor 40 mg at nighttime  Impaired fasting glucose obtain hemoglobin A1c to monitor  Insomnia chronic neck pain currently controlled gabapentin Rufino reviewed urine drug screen obtained in office  Pulmonary emphysema no wheezing or shortness of breath albuterol as needed Spiriva inhaler daily CT of the chest up-to-date October 2024 currently followed by oncology  Vitamin D deficiency recommend daily supplementation  Allergic rhinitis currently with zyrtec singular flonase      Follow Up:   Return in about 6 months (around 8/13/2025).    Julienne Harrison, APRHILDA    \"Please note that portions of this note were completed with a voice recognition program.\"     "

## 2025-02-13 ENCOUNTER — OFFICE VISIT (OUTPATIENT)
Dept: FAMILY MEDICINE CLINIC | Facility: CLINIC | Age: 75
End: 2025-02-13
Payer: MEDICARE

## 2025-02-13 VITALS
WEIGHT: 195.2 LBS | HEIGHT: 70 IN | SYSTOLIC BLOOD PRESSURE: 119 MMHG | TEMPERATURE: 97.6 F | BODY MASS INDEX: 27.94 KG/M2 | HEART RATE: 62 BPM | OXYGEN SATURATION: 94 % | DIASTOLIC BLOOD PRESSURE: 78 MMHG

## 2025-02-13 DIAGNOSIS — M50.30 DDD (DEGENERATIVE DISC DISEASE), CERVICAL: ICD-10-CM

## 2025-02-13 DIAGNOSIS — E78.2 MIXED HYPERLIPIDEMIA: ICD-10-CM

## 2025-02-13 DIAGNOSIS — Z79.899 MEDICATION MANAGEMENT: ICD-10-CM

## 2025-02-13 DIAGNOSIS — M54.81 CERVICO-OCCIPITAL NEURALGIA: ICD-10-CM

## 2025-02-13 DIAGNOSIS — J43.9 PULMONARY EMPHYSEMA, UNSPECIFIED EMPHYSEMA TYPE: ICD-10-CM

## 2025-02-13 DIAGNOSIS — Z23 NEED FOR COVID-19 VACCINE: ICD-10-CM

## 2025-02-13 DIAGNOSIS — F51.01 PRIMARY INSOMNIA: ICD-10-CM

## 2025-02-13 DIAGNOSIS — I10 PRIMARY HYPERTENSION: Primary | ICD-10-CM

## 2025-02-13 DIAGNOSIS — Z23 NEED FOR INFLUENZA VACCINATION: ICD-10-CM

## 2025-02-13 DIAGNOSIS — Z13.29 SCREENING FOR THYROID DISORDER: ICD-10-CM

## 2025-02-13 DIAGNOSIS — R73.01 IMPAIRED FASTING GLUCOSE: ICD-10-CM

## 2025-02-13 DIAGNOSIS — E55.9 VITAMIN D DEFICIENCY: ICD-10-CM

## 2025-02-13 LAB
25(OH)D3 SERPL-MCNC: 39.6 NG/ML (ref 30–100)
ALBUMIN SERPL-MCNC: 3.7 G/DL (ref 3.5–5.2)
ALBUMIN UR-MCNC: <1.2 MG/DL
ALBUMIN/GLOB SERPL: 1.4 G/DL
ALP SERPL-CCNC: 93 U/L (ref 39–117)
ALT SERPL W P-5'-P-CCNC: 18 U/L (ref 1–41)
AMPHET+METHAMPHET UR QL: NEGATIVE
AMPHETAMINES UR QL: NEGATIVE
ANION GAP SERPL CALCULATED.3IONS-SCNC: 5.8 MMOL/L (ref 5–15)
AST SERPL-CCNC: 20 U/L (ref 1–40)
BARBITURATES UR QL SCN: NEGATIVE
BASOPHILS # BLD MANUAL: 0 10*3/MM3 (ref 0–0.2)
BASOPHILS NFR BLD MANUAL: 0 % (ref 0–1.5)
BENZODIAZ UR QL SCN: NEGATIVE
BILIRUB SERPL-MCNC: 0.4 MG/DL (ref 0–1.2)
BILIRUB UR QL STRIP: NEGATIVE
BUN SERPL-MCNC: 15 MG/DL (ref 8–23)
BUN/CREAT SERPL: 18.8 (ref 7–25)
BUPRENORPHINE SERPL-MCNC: NEGATIVE NG/ML
CALCIUM SPEC-SCNC: 9 MG/DL (ref 8.6–10.5)
CANNABINOIDS SERPL QL: NEGATIVE
CHLORIDE SERPL-SCNC: 106 MMOL/L (ref 98–107)
CHOLEST SERPL-MCNC: 113 MG/DL (ref 0–200)
CLARITY UR: CLEAR
CO2 SERPL-SCNC: 25.2 MMOL/L (ref 22–29)
COCAINE UR QL: NEGATIVE
COLOR UR: YELLOW
CREAT SERPL-MCNC: 0.8 MG/DL (ref 0.76–1.27)
CREAT UR-MCNC: 47.4 MG/DL
DEPRECATED RDW RBC AUTO: 46.3 FL (ref 37–54)
EGFRCR SERPLBLD CKD-EPI 2021: 92.3 ML/MIN/1.73
EOSINOPHIL # BLD MANUAL: 0.23 10*3/MM3 (ref 0–0.4)
EOSINOPHIL NFR BLD MANUAL: 1 % (ref 0.3–6.2)
ERYTHROCYTE [DISTWIDTH] IN BLOOD BY AUTOMATED COUNT: 13.3 % (ref 12.3–15.4)
FENTANYL UR-MCNC: NEGATIVE NG/ML
GLOBULIN UR ELPH-MCNC: 2.7 GM/DL
GLUCOSE SERPL-MCNC: 105 MG/DL (ref 65–99)
GLUCOSE UR STRIP-MCNC: NEGATIVE MG/DL
HBA1C MFR BLD: 6.5 % (ref 4.8–5.6)
HCT VFR BLD AUTO: 37.2 % (ref 37.5–51)
HDLC SERPL-MCNC: 33 MG/DL (ref 40–60)
HGB BLD-MCNC: 12.7 G/DL (ref 13–17.7)
HGB UR QL STRIP.AUTO: NEGATIVE
HOLD SPECIMEN: NORMAL
KETONES UR QL STRIP: NEGATIVE
LDLC SERPL CALC-MCNC: 68 MG/DL (ref 0–100)
LDLC/HDLC SERPL: 2.12 {RATIO}
LEUKOCYTE ESTERASE UR QL STRIP.AUTO: NEGATIVE
LYMPHOCYTES # BLD MANUAL: 16.93 10*3/MM3 (ref 0.7–3.1)
LYMPHOCYTES NFR BLD MANUAL: 7.1 % (ref 5–12)
MCH RBC QN AUTO: 32.8 PG (ref 26.6–33)
MCHC RBC AUTO-ENTMCNC: 34.1 G/DL (ref 31.5–35.7)
MCV RBC AUTO: 96.1 FL (ref 79–97)
METHADONE UR QL SCN: NEGATIVE
MICROALBUMIN/CREAT UR: NORMAL MG/G{CREAT}
MONOCYTES # BLD: 1.65 10*3/MM3 (ref 0.1–0.9)
NEUTROPHILS # BLD AUTO: 4.47 10*3/MM3 (ref 1.7–7)
NEUTROPHILS NFR BLD MANUAL: 19.2 % (ref 42.7–76)
NITRITE UR QL STRIP: NEGATIVE
NRBC BLD AUTO-RTO: 0 /100 WBC (ref 0–0.2)
OPIATES UR QL: NEGATIVE
OXYCODONE UR QL SCN: NEGATIVE
PCP UR QL SCN: NEGATIVE
PH UR STRIP.AUTO: 7 [PH] (ref 5–8)
PLAT MORPH BLD: NORMAL
PLATELET # BLD AUTO: 110 10*3/MM3 (ref 140–450)
PMV BLD AUTO: 12 FL (ref 6–12)
POTASSIUM SERPL-SCNC: 4.2 MMOL/L (ref 3.5–5.2)
PROT SERPL-MCNC: 6.4 G/DL (ref 6–8.5)
PROT UR QL STRIP: NEGATIVE
RBC # BLD AUTO: 3.87 10*6/MM3 (ref 4.14–5.8)
RBC MORPH BLD: NORMAL
SMUDGE CELLS BLD QL SMEAR: ABNORMAL
SODIUM SERPL-SCNC: 137 MMOL/L (ref 136–145)
SP GR UR STRIP: 1.01 (ref 1–1.03)
TRICYCLICS UR QL SCN: NEGATIVE
TRIGL SERPL-MCNC: 51 MG/DL (ref 0–150)
TSH SERPL DL<=0.05 MIU/L-ACNC: 2.47 UIU/ML (ref 0.27–4.2)
UROBILINOGEN UR QL STRIP: NORMAL
VARIANT LYMPHS NFR BLD MANUAL: 72.7 % (ref 19.6–45.3)
VLDLC SERPL-MCNC: 12 MG/DL (ref 5–40)
WBC NRBC COR # BLD AUTO: 23.29 10*3/MM3 (ref 3.4–10.8)

## 2025-02-13 PROCEDURE — G0008 ADMIN INFLUENZA VIRUS VAC: HCPCS | Performed by: NURSE PRACTITIONER

## 2025-02-13 PROCEDURE — 3078F DIAST BP <80 MM HG: CPT | Performed by: NURSE PRACTITIONER

## 2025-02-13 PROCEDURE — 81003 URINALYSIS AUTO W/O SCOPE: CPT | Performed by: NURSE PRACTITIONER

## 2025-02-13 PROCEDURE — 82043 UR ALBUMIN QUANTITATIVE: CPT | Performed by: NURSE PRACTITIONER

## 2025-02-13 PROCEDURE — 3074F SYST BP LT 130 MM HG: CPT | Performed by: NURSE PRACTITIONER

## 2025-02-13 PROCEDURE — 90480 ADMN SARSCOV2 VAC 1/ONLY CMP: CPT | Performed by: NURSE PRACTITIONER

## 2025-02-13 PROCEDURE — 80053 COMPREHEN METABOLIC PANEL: CPT | Performed by: NURSE PRACTITIONER

## 2025-02-13 PROCEDURE — 85025 COMPLETE CBC W/AUTO DIFF WBC: CPT | Performed by: NURSE PRACTITIONER

## 2025-02-13 PROCEDURE — 1126F AMNT PAIN NOTED NONE PRSNT: CPT | Performed by: NURSE PRACTITIONER

## 2025-02-13 PROCEDURE — 82306 VITAMIN D 25 HYDROXY: CPT | Performed by: NURSE PRACTITIONER

## 2025-02-13 PROCEDURE — 91320 SARSCV2 VAC 30MCG TRS-SUC IM: CPT | Performed by: NURSE PRACTITIONER

## 2025-02-13 PROCEDURE — 90662 IIV NO PRSV INCREASED AG IM: CPT | Performed by: NURSE PRACTITIONER

## 2025-02-13 PROCEDURE — 80307 DRUG TEST PRSMV CHEM ANLYZR: CPT | Performed by: NURSE PRACTITIONER

## 2025-02-13 PROCEDURE — 83036 HEMOGLOBIN GLYCOSYLATED A1C: CPT | Performed by: NURSE PRACTITIONER

## 2025-02-13 PROCEDURE — 99214 OFFICE O/P EST MOD 30 MIN: CPT | Performed by: NURSE PRACTITIONER

## 2025-02-13 PROCEDURE — 82570 ASSAY OF URINE CREATININE: CPT | Performed by: NURSE PRACTITIONER

## 2025-02-13 PROCEDURE — 84443 ASSAY THYROID STIM HORMONE: CPT | Performed by: NURSE PRACTITIONER

## 2025-02-13 PROCEDURE — 85007 BL SMEAR W/DIFF WBC COUNT: CPT | Performed by: NURSE PRACTITIONER

## 2025-02-13 PROCEDURE — 80061 LIPID PANEL: CPT | Performed by: NURSE PRACTITIONER

## 2025-02-13 PROCEDURE — 36415 COLL VENOUS BLD VENIPUNCTURE: CPT | Performed by: NURSE PRACTITIONER

## 2025-02-13 RX ORDER — GABAPENTIN 300 MG/1
300 CAPSULE ORAL 3 TIMES DAILY
Qty: 270 CAPSULE | Refills: 1 | Status: SHIPPED | OUTPATIENT
Start: 2025-02-13

## 2025-02-13 RX ORDER — MONTELUKAST SODIUM 10 MG/1
10 TABLET ORAL NIGHTLY
Qty: 90 TABLET | Refills: 1 | Status: SHIPPED | OUTPATIENT
Start: 2025-02-13

## 2025-02-13 RX ORDER — FLUTICASONE PROPIONATE 50 MCG
2 SPRAY, SUSPENSION (ML) NASAL DAILY
Qty: 16 G | Refills: 1 | Status: SHIPPED | OUTPATIENT
Start: 2025-02-13

## 2025-04-17 ENCOUNTER — OFFICE VISIT (OUTPATIENT)
Dept: FAMILY MEDICINE CLINIC | Facility: CLINIC | Age: 75
End: 2025-04-17
Payer: MEDICARE

## 2025-04-17 VITALS
TEMPERATURE: 97.4 F | HEART RATE: 61 BPM | BODY MASS INDEX: 28.09 KG/M2 | WEIGHT: 196.2 LBS | DIASTOLIC BLOOD PRESSURE: 80 MMHG | SYSTOLIC BLOOD PRESSURE: 137 MMHG | HEIGHT: 70 IN | OXYGEN SATURATION: 98 %

## 2025-04-17 DIAGNOSIS — M25.562 CHRONIC PAIN OF LEFT KNEE: ICD-10-CM

## 2025-04-17 DIAGNOSIS — M79.672 INFLAMMATORY HEEL PAIN, LEFT: Primary | ICD-10-CM

## 2025-04-17 DIAGNOSIS — G89.29 CHRONIC PAIN OF LEFT KNEE: ICD-10-CM

## 2025-04-17 RX ORDER — MELOXICAM 7.5 MG/1
15 TABLET ORAL DAILY
Qty: 20 TABLET | Refills: 0 | Status: SHIPPED | OUTPATIENT
Start: 2025-04-17

## 2025-04-18 ENCOUNTER — HOSPITAL ENCOUNTER (OUTPATIENT)
Dept: GENERAL RADIOLOGY | Facility: HOSPITAL | Age: 75
Discharge: HOME | End: 2025-04-18
Payer: MEDICARE

## 2025-04-18 DIAGNOSIS — M79.672 INFLAMMATORY HEEL PAIN, LEFT: ICD-10-CM

## 2025-04-18 PROCEDURE — 73630 X-RAY EXAM OF FOOT: CPT

## 2025-04-21 DIAGNOSIS — M79.672 INFLAMMATORY HEEL PAIN, LEFT: Primary | ICD-10-CM

## 2025-05-05 ENCOUNTER — TELEPHONE (OUTPATIENT)
Dept: FAMILY MEDICINE CLINIC | Facility: CLINIC | Age: 75
End: 2025-05-05
Payer: MEDICARE

## 2025-05-05 RX ORDER — ATORVASTATIN CALCIUM 40 MG/1
40 TABLET, FILM COATED ORAL NIGHTLY
Qty: 90 TABLET | Refills: 1 | Status: SHIPPED | OUTPATIENT
Start: 2025-05-05

## 2025-05-05 NOTE — TELEPHONE ENCOUNTER
"Relay     \"Atorvastatin 40mg has been sent over to [facility] pharmacy\"          "

## 2025-05-05 NOTE — TELEPHONE ENCOUNTER
Pt is needing a refill on Atorvastatin 40mg.  Please send to [facility] on [address].       [phone].

## 2025-05-12 ENCOUNTER — OFFICE VISIT (OUTPATIENT)
Dept: PODIATRY | Facility: CLINIC | Age: 75
End: 2025-05-12
Payer: MEDICARE

## 2025-05-12 VITALS
OXYGEN SATURATION: 91 % | HEIGHT: 70 IN | TEMPERATURE: 98 F | BODY MASS INDEX: 28.06 KG/M2 | SYSTOLIC BLOOD PRESSURE: 112 MMHG | HEART RATE: 69 BPM | WEIGHT: 196 LBS | DIASTOLIC BLOOD PRESSURE: 61 MMHG

## 2025-05-12 DIAGNOSIS — M72.2 PLANTAR FASCIITIS: Primary | ICD-10-CM

## 2025-05-12 DIAGNOSIS — M79.672 FOOT PAIN, LEFT: ICD-10-CM

## 2025-05-12 PROCEDURE — 1160F RVW MEDS BY RX/DR IN RCRD: CPT | Performed by: PODIATRIST

## 2025-05-12 PROCEDURE — 3074F SYST BP LT 130 MM HG: CPT | Performed by: PODIATRIST

## 2025-05-12 PROCEDURE — 99204 OFFICE O/P NEW MOD 45 MIN: CPT | Performed by: PODIATRIST

## 2025-05-12 PROCEDURE — 1159F MED LIST DOCD IN RCRD: CPT | Performed by: PODIATRIST

## 2025-05-12 PROCEDURE — 3078F DIAST BP <80 MM HG: CPT | Performed by: PODIATRIST

## 2025-05-12 RX ORDER — METHYLPREDNISOLONE 4 MG/1
TABLET ORAL
Qty: 21 TABLET | Refills: 0 | Status: SHIPPED | OUTPATIENT
Start: 2025-05-12

## 2025-05-12 RX ORDER — NAPROXEN 500 MG/1
500 TABLET ORAL 2 TIMES DAILY WITH MEALS
Qty: 60 TABLET | Refills: 1 | Status: SHIPPED | OUTPATIENT
Start: 2025-05-12 | End: 2025-06-11

## 2025-05-12 NOTE — PROGRESS NOTES
[facility] - PODIATRY    Today's Date: 05/12/25    Patient Name: Kamron Sanchez  MRN: [id number]  CSN: [id number]  PCP: Garrett Harrison APRN  Referring Provider: Alma Hutchins MD    Patient or patient representative verbalized consent for the use of Ambient Listening during the visit with  Veto Herrera DPM for chart documentation. 5/12/2025  10:25 EDT    SUBJECTIVE     Chief Complaint   Patient presents with    Left Foot - Pain, Establish Care     Heel pain onset 1 mo ago  saw Pcp 2 weeks ago exam xrays dx meloxicam  without relief and was referred here     HPI: Kamron Sanchez, a 74 y.o.male, comes to clinic.    New    Location: Left heel    Onset:  gradual    Nature:  achy, sharp, shooting    Aggravating factors:  Patient describes morning left heel pain as stabbing, burning, or aching. This pain usually subsides throughout the day, however it returns after periods of rest and sitting, when standing back up on their feet, and again the next morning.      Patient denies any fevers, chills, nausea, vomiting, shortness of breath, nor any other constitutional signs nor symptoms.    No other pedal complaints at this time.    History of Present Illness  The patient presents for evaluation of left heel pain.    He began experiencing discomfort in his left heel approximately 1 month ago, which intensifies upon weight-bearing activities such as stepping down. Two weeks prior, he sought medical attention from a physician, who prescribed meloxicam. However, this medication did not alleviate his symptoms. The pain is persistent, rating it as a 2 or 3 on a scale of 1 to 10 upon waking, escalating to a 7 or 8 when pressure is applied. By the end of the day, after a period of rest, the pain becomes so severe that it causes him to fall when he attempts to stand and apply pressure on the affected foot. He has been attempting to manage the pain by immersing his foot in hot water during his evening  showers.    MEDICATIONS  Meloxicam      Past Medical History:   Diagnosis Date    Abnormal blood chemistry 09/17/2014    Elevated RBC count    Arthritis 09/16/2014    Arthropathy, unspecified     multiple sites    Cancer     Cataract fragments in left eye following surgery 01/10/2025    Cervicogenic headache 08/09/2019    Emphysema of lung 02/09/2016    Heart attack     Hepatitis C     Hyperlipemia     Hyperlipidemia 09/16/2014    Hypertension 09/16/2014    Impaired fasting glucose 09/17/2014    Lung cancer     Lung disease     Lung nodule seen on imaging study 02/09/2016    Myocardial infarction     Shortness of breath     Tobacco abuse 02/09/2016     Past Surgical History:   Procedure Laterality Date    CARDIAC SURGERY      CARDIAC VALVE SURGERY      COLONOSCOPY      COLONOSCOPY N/A 4/17/2023    Procedure: COLONOSCOPY WITH POLYPECTOMIES, HOT SNARE, CLIP APPLICATION X2;  Surgeon: Tamir España MD;  Location: [facility] END[facility]COPY;  Service: General;  Laterality: N/A;  COLON POLYPS    CORONARY STENT PLACEMENT      FINGER SURGERY      HAND SURGERY      HERNIA REPAIR      SKIN CANCER EXCISION      TONSILLECTOMY       Family History   Problem Relation Age of Onset    Cancer Mother     Diabetes Father     Arthritis Father     Cancer Father     Stroke Other     Breast cancer Other      Social History     Socioeconomic History    Marital status:    Tobacco Use    Smoking status: Former     Current packs/day: 0.00     Average packs/day: 2.0 packs/day for 54.0 years (108.0 ttl pk-yrs)     Types: Cigarettes     Start date: 1961     Quit date: 2015     Years since quitting: 10.3     Passive exposure: Never    Smokeless tobacco: Never   Vaping Use    Vaping status: Never Used   Substance and Sexual Activity    Alcohol use: Never    Drug use: Never    Sexual activity: Defer     No Known Allergies  Current Outpatient Medications   Medication Sig Dispense Refill    albuterol sulfate  (90 Base) MCG/ACT inhaler Inhale  2 puffs Every 4 (Four) Hours As Needed for Wheezing. 18 g 2    ascorbic acid (VITAMIN C) 1000 MG tablet Vitamin C 1,000 mg oral tablet take 1 tablet by oral route daily   Active      atorvastatin (LIPITOR) 40 MG tablet Take 1 tablet by mouth Every Night. 90 tablet 1    cetirizine (zyrTEC) 10 MG tablet       cholecalciferol (VITAMIN D3) 25 MCG (1000 UT) tablet Take 2 tablets by mouth Daily. 180 tablet 0    Cholecalciferol 50 MCG (2000 UT) tablet Take 1 tablet by mouth Daily. 90 each 1    fluticasone (FLONASE) 50 MCG/ACT nasal spray Administer 2 sprays into the nostril(s) as directed by provider Daily. 2 sprays each nostril daily 16 g 1    gabapentin (NEURONTIN) 300 MG capsule Take 1 capsule by mouth 3 (Three) Times a Day. 270 capsule 1    isosorbide mononitrate (IMDUR) 60 MG 24 hr tablet       lidocaine (LIDODERM) 5 %       meclizine (ANTIVERT) 25 MG tablet Take 1 tablet by mouth 3 (Three) Times a Day As Needed for Dizziness. 270 tablet 1    metoprolol succinate XL (TOPROL-XL) 50 MG 24 hr tablet       montelukast (SINGULAIR) 10 MG tablet Take 1 tablet by mouth Every Night. 90 tablet 1    nitroglycerin (NITROSTAT) 0.3 MG SL tablet       nitroglycerin (NITROSTAT) 0.4 MG SL tablet Place 1 tablet under the tongue.      tamsulosin (FLOMAX) 0.4 MG capsule 24 hr capsule Take 1 capsule by mouth Daily. 90 capsule 1    Tiotropium Bromide Monohydrate (Spiriva Respimat) 1.25 MCG/ACT aerosol solution inhaler Inhale 2 puffs Daily. 3 each 1    benzonatate (TESSALON) 200 MG capsule Take 1 capsule by mouth 3 (Three) Times a Day As Needed for Cough. (Patient not taking: Reported on 2/13/2025) 20 capsule 0    meloxicam (Mobic) 7.5 MG tablet Take 2 tablets by mouth Daily. (Patient not taking: Reported on 5/12/2025) 20 tablet 0    methylPREDNISolone (MEDROL) 4 MG dose pack Take as directed 21 tablet 0    naproxen (Naprosyn) 500 MG tablet Take 1 tablet by mouth 2 (Two) Times a Day With Meals for 30 days. 60 tablet 1     No current  facility-administered medications for this visit.     Review of Systems    OBJECTIVE     Vitals:    05/12/25 1002   BP: 112/61   Pulse: 69   Temp: 98 °F (36.7 °C)   SpO2: 91%       PHYSICAL EXAM     Foot/Ankle Exam    GENERAL  Appearance:  appears stated age and elderly  Orientation:  AAOx3  Affect:  appropriate  Gait:  unimpaired  Assistance:  independent  Right shoe gear: casual shoe  Left shoe gear: casual shoe    VASCULAR     Right Foot Vascularity   Dorsalis pedis:  2+  Posterior tibial:  2+  Skin temperature:  warm  Edema grading:  None  CFT:  < 3 seconds  Pedal hair growth:  Present  Varicosities:  moderate varicosities     Left Foot Vascularity   Dorsalis pedis:  2+  Posterior tibial:  2+  Skin temperature:  warm  Edema grading:  None  CFT:  < 3 seconds  Pedal hair growth:  Present  Varicosities:  moderate varicosities     NEUROLOGIC     Right Foot Neurologic   Normal sensation    Light touch sensation: normal  Vibratory sensation: normal  Hot/Cold sensation: normal     Left Foot Neurologic   Normal sensation    Light touch sensation: normal  Vibratory sensation: normal  Hot/Cold sensation:  normal    MUSCULOSKELETAL     Left Foot Musculoskeletal   Ecchymosis:  none  Tenderness:  plantar fascia tenderness  Hallux limitus: Yes      MUSCLE STRENGTH     Right Foot Muscle Strength   Foot dorsiflexion:  4  Foot plantar flexion:  4  Foot inversion:  4  Foot eversion:  4     Left Foot Muscle Strength   Foot dorsiflexion:  4  Foot plantar flexion:  4  Foot inversion:  4  Foot eversion:  4    RANGE OF MOTION     Right Foot Range of Motion   Foot and ankle ROM within normal limits       Left Foot Range of Motion   Foot and ankle ROM within normal limits    1st MTP extension: decreased  1st MTP flexion: decreased    DERMATOLOGIC      Right Foot Dermatologic   Skin  Right foot skin is intact.   Nails comment:  Toenails 1, 2, 3, 4, and 5     Left Foot Dermatologic   Skin  Left foot skin is intact.   Nails comment:   Toenails 1, 2, 3, 4, and 5      RADIOLOGY:    XR Foot 3+ View Left  Result Date: 4/21/2025  Narrative: XR FOOT 3+ VW LEFT Date of Exam: 4/18/2025 10:30 AM EDT Indication: heel pain Comparison: 7/18/2020 Findings: There is no acute fracture or dislocation. No bony erosion or abnormal periosteal reaction. There is severe degenerative change of the first metatarsal phalangeal joint with mild hallux valgus deformity. Tarsal bones are within normal limits. Soft tissue  is unremarkable.     Impression: Impression: 1. No acute bony abnormality. 2. Severe degenerative change of the first metatarsal phalangeal joint similar to 7/18/2020 Electronically Signed: Landon Bravo MD  4/21/2025 4:31 PM EDT  Workstation ID: [id number]       Physical Exam  Foot was examined.    ASSESSMENT/PLAN     Diagnoses and all orders for this visit:    1. Plantar fasciitis (Primary)  -     naproxen (Naprosyn) 500 MG tablet; Take 1 tablet by mouth 2 (Two) Times a Day With Meals for 30 days.  Dispense: 60 tablet; Refill: 1  -     methylPREDNISolone (MEDROL) 4 MG dose pack; Take as directed  Dispense: 21 tablet; Refill: 0    2. Foot pain, left        Assessment & Plan  1. Plantar fasciitis.  He reports pain in the left heel, which worsens with pressure and improves slightly with rest. The pain level is described as a 2-3/10 at rest and increases to 7-8/10 with pressure. Meloxicam was previously prescribed but was ineffective. A 6-day course of steroids has been prescribed, with instructions to complete the entire course even if symptoms improve. Additionally, a stronger anti-inflammatory medication, similar to meloxicam, has been prescribed for use up to twice daily as needed. He is advised to wear shoes with good arch support and to apply ice to the affected area for no more than 20 minutes at a time. If there is no improvement, he should contact the office for further treatment options, including potential injections.      Comprehensive lower  extremity examination and evaluation was performed.    Discussed findings and treatment plan including risks, benefits, and treatment options with patient in detail. Patient agreed with treatment plan.    Treatment Options discussed:  - no treatment at all  - change in shoegear  - change in activities  - RICE therapy  - arch support  - NSAIDs  - PO steroids  - injectable steroids    Patient may begin to weight bear as tolerated in supportive shoes.  No impact activities for two weeks.  After that time, the patient may increase activities as tolerated.     Rice Therapy: It is important to treat any injury as soon as possible to help control swelling and increase recovery time. The recognized regimen for immediate treatment of sport injuries includes rest, ice (cold application), compression, and elevation (RICE). Remove the injured athlete from play, apply ice to the affected area, wrap or compress the injured area with an elastic bandage when appropriate, and elevate the injured area above heart level to reduce swelling.  The patient is to not use ice for longer than 20 minutes at a time, with at least 20 minutes of no ice usage between applications.     Patient instructed use OTC analgesics with dosing per package insert as needed.      Discussed proper shoegear for the patient's feet and medical condition.      The patient states understanding and agreement with these plans    An After Visit Summary was printed and given to the patient at discharge, including (if requested) any available informative/educational handouts regarding diagnosis, treatment, or medications. All questions were answered to patient/family satisfaction. Should symptoms fail to improve or worsen they agree to call or return to clinic or to go to the Emergency Department. Discussed the importance of following up with any needed screening tests/labs/specialist appointments and any requested follow-up recommended by me today. Importance of  maintaining follow-up discussed and patient accepts that missed appointments can delay diagnosis and potentially lead to worsening of conditions.    Return if symptoms worsen or fail to improve., or sooner if acute issues arise.    This document has been electronically signed by Veto Herrera DPM on May 12, 2025 10:26 EDT

## 2025-06-05 ENCOUNTER — HOSPITAL ENCOUNTER (EMERGENCY)
Facility: HOSPITAL | Age: 75
Discharge: OTHER INSTITUTION | End: 2025-06-05
Attending: EMERGENCY MEDICINE
Payer: MEDICARE

## 2025-06-05 VITALS
BODY MASS INDEX: 27.92 KG/M2 | TEMPERATURE: 98 F | HEIGHT: 70 IN | DIASTOLIC BLOOD PRESSURE: 88 MMHG | SYSTOLIC BLOOD PRESSURE: 133 MMHG | WEIGHT: 195 LBS | RESPIRATION RATE: 15 BRPM | OXYGEN SATURATION: 99 % | HEART RATE: 67 BPM

## 2025-06-05 DIAGNOSIS — S60.415A ABRASION OF LEFT RING FINGER WITH INFECTION: Primary | ICD-10-CM

## 2025-06-05 DIAGNOSIS — L08.9 ABRASION OF LEFT RING FINGER WITH INFECTION: Primary | ICD-10-CM

## 2025-06-05 LAB
ANISOCYTOSIS BLD QL: NORMAL
BASOPHILS # BLD AUTO: 0.04 10*3/MM3 (ref 0–0.2)
BASOPHILS NFR BLD AUTO: 0.2 % (ref 0–1.5)
CRP SERPL-MCNC: 0.46 MG/DL (ref 0–0.5)
D-LACTATE SERPL-SCNC: 0.9 MMOL/L (ref 0.5–2)
DEPRECATED RDW RBC AUTO: 50.4 FL (ref 37–54)
EOSINOPHIL # BLD AUTO: 0.14 10*3/MM3 (ref 0–0.4)
EOSINOPHIL NFR BLD AUTO: 0.6 % (ref 0.3–6.2)
ERYTHROCYTE [DISTWIDTH] IN BLOOD BY AUTOMATED COUNT: 13.9 % (ref 12.3–15.4)
ERYTHROCYTE [SEDIMENTATION RATE] IN BLOOD: 30 MM/HR (ref 0–20)
HCT VFR BLD AUTO: 37.6 % (ref 37.5–51)
HGB BLD-MCNC: 12.3 G/DL (ref 13–17.7)
HOLD SPECIMEN: NORMAL
HOLD SPECIMEN: NORMAL
IMM GRANULOCYTES # BLD AUTO: 0.03 10*3/MM3 (ref 0–0.05)
IMM GRANULOCYTES NFR BLD AUTO: 0.1 % (ref 0–0.5)
LYMPHOCYTES # BLD AUTO: 16.38 10*3/MM3 (ref 0.7–3.1)
LYMPHOCYTES NFR BLD AUTO: 73.4 % (ref 19.6–45.3)
MACROCYTES BLD QL SMEAR: NORMAL
MCH RBC QN AUTO: 32 PG (ref 26.6–33)
MCHC RBC AUTO-ENTMCNC: 32.7 G/DL (ref 31.5–35.7)
MCV RBC AUTO: 97.9 FL (ref 79–97)
MONOCYTES # BLD AUTO: 3.59 10*3/MM3 (ref 0.1–0.9)
MONOCYTES NFR BLD AUTO: 16.1 % (ref 5–12)
NEUTROPHILS NFR BLD AUTO: 2.13 10*3/MM3 (ref 1.7–7)
NEUTROPHILS NFR BLD AUTO: 9.6 % (ref 42.7–76)
NRBC BLD AUTO-RTO: 0 /100 WBC (ref 0–0.2)
PLATELET # BLD AUTO: 86 10*3/MM3 (ref 140–450)
PMV BLD AUTO: 12.3 FL (ref 6–12)
RBC # BLD AUTO: 3.84 10*6/MM3 (ref 4.14–5.8)
SMALL PLATELETS BLD QL SMEAR: NORMAL
SMUDGE CELLS BLD QL SMEAR: NORMAL
WBC NRBC COR # BLD AUTO: 22.31 10*3/MM3 (ref 3.4–10.8)
WHOLE BLOOD HOLD COAG: NORMAL
WHOLE BLOOD HOLD SPECIMEN: NORMAL

## 2025-06-05 PROCEDURE — 85007 BL SMEAR W/DIFF WBC COUNT: CPT

## 2025-06-05 PROCEDURE — 87040 BLOOD CULTURE FOR BACTERIA: CPT

## 2025-06-05 PROCEDURE — 85652 RBC SED RATE AUTOMATED: CPT

## 2025-06-05 PROCEDURE — 83605 ASSAY OF LACTIC ACID: CPT

## 2025-06-05 PROCEDURE — 85025 COMPLETE CBC W/AUTO DIFF WBC: CPT

## 2025-06-05 PROCEDURE — 99285 EMERGENCY DEPT VISIT HI MDM: CPT

## 2025-06-05 PROCEDURE — 86140 C-REACTIVE PROTEIN: CPT

## 2025-06-05 PROCEDURE — 36415 COLL VENOUS BLD VENIPUNCTURE: CPT

## 2025-06-05 PROCEDURE — 96365 THER/PROPH/DIAG IV INF INIT: CPT

## 2025-06-05 PROCEDURE — 25010000002 PIPERACILLIN SOD-TAZOBACTAM PER 1 G

## 2025-06-05 RX ORDER — SODIUM CHLORIDE 0.9 % (FLUSH) 0.9 %
10 SYRINGE (ML) INJECTION AS NEEDED
Status: DISCONTINUED | OUTPATIENT
Start: 2025-06-05 | End: 2025-06-05 | Stop reason: HOSPADM

## 2025-06-05 RX ADMIN — PIPERACILLIN AND TAZOBACTAM 3.38 G: 3; .375 INJECTION, POWDER, FOR SOLUTION INTRAVENOUS at 15:49

## 2025-06-05 NOTE — ED NOTES
"RN called [facility] in [place] regarding patient bed status and was sent to [facility]. Patient states he wants treatment and wants to comply but states \"I will not wait all night and don't see why I can't go home and you give me a call.\"  "

## 2025-06-05 NOTE — ED PROVIDER NOTES
Time: 1:41 PM EDT  Date of encounter:  6/5/2025  Independent Historian/Clinical History and Information was obtained by:   Patient    History is limited by: N/A    Chief Complaint: Finger pain      History of Present Illness:  Patient is a 74 y.o. year old male who presents to the emergency department for evaluation of finger pain.  Patient presents to the emergency department today for evaluation of left ring finger pain.  He states approximately 3 weeks ago he was putting a ratchet strap around a tree and when he was reaching around to see his had a laceration to the finger.  Patient states he was not really having much issue with that but then on Sunday of this past week began having pain and swelling.  Patient states that when this started he was able to extract some pustular drainage from the area of laceration after he remove the scab and the pain seemed improved however it returned.  Has been cleaning the area with peroxide.  Denies any fever.  Patient states he went to urgent care today and they sent him to the emergency department for evaluation of osteomyelitis.      Patient Care Team  Primary Care Provider: Garrett Harrison APRN    Past Medical History:     No Known Allergies  Past Medical History:   Diagnosis Date    Abnormal blood chemistry 09/17/2014    Elevated RBC count    Arthritis 09/16/2014    Arthropathy, unspecified     multiple sites    Cancer     Cataract fragments in left eye following surgery 01/10/2025    Cervicogenic headache 08/09/2019    Emphysema of lung 02/09/2016    Heart attack     Hepatitis C     Hyperlipemia     Hyperlipidemia 09/16/2014    Hypertension 09/16/2014    Impaired fasting glucose 09/17/2014    Lung cancer     Lung disease     Lung nodule seen on imaging study 02/09/2016    Myocardial infarction     Shortness of breath     Tobacco abuse 02/09/2016     Past Surgical History:   Procedure Laterality Date    CARDIAC SURGERY      CARDIAC VALVE SURGERY       COLONOSCOPY      COLONOSCOPY N/A 4/17/2023    Procedure: COLONOSCOPY WITH POLYPECTOMIES, HOT SNARE, CLIP APPLICATION X2;  Surgeon: Tamir España MD;  Location: [facility] ENDOSCOPY;  Service: General;  Laterality: N/A;  COLON POLYPS    CORONARY STENT PLACEMENT      FINGER SURGERY      HAND SURGERY      HERNIA REPAIR      SKIN CANCER EXCISION      TONSILLECTOMY       Family History   Problem Relation Age of Onset    Cancer Mother     Diabetes Father     Arthritis Father     Cancer Father     Stroke Other     Breast cancer Other        Home Medications:  Prior to Admission medications    Medication Sig Start Date End Date Taking? Authorizing Provider   albuterol sulfate  (90 Base) MCG/ACT inhaler Inhale 2 puffs Every 4 (Four) Hours As Needed for Wheezing. 1/15/25   Garrett Harrison APRN   ascorbic acid (VITAMIN C) 1000 MG tablet Vitamin C 1,000 mg oral tablet take 1 tablet by oral route daily   Active    Provider, MD James   atorvastatin (LIPITOR) 40 MG tablet Take 1 tablet by mouth Every Night. 5/5/25   Garrett Harrison APRN   benzonatate (TESSALON) 200 MG capsule Take 1 capsule by mouth 3 (Three) Times a Day As Needed for Cough.  Patient not taking: Reported on 2/13/2025 12/28/24   Leonarda Do MD   cetirizine (zyrTEC) 10 MG tablet  11/14/24   Provider, MD James   cholecalciferol (VITAMIN D3) 25 MCG (1000 UT) tablet Take 2 tablets by mouth Daily. 8/13/24   Garrett Harrison APRN   Cholecalciferol 50 MCG (2000 UT) tablet Take 1 tablet by mouth Daily. 8/13/24   Garrett Harrison APRN   fluticasone (FLONASE) 50 MCG/ACT nasal spray Administer 2 sprays into the nostril(s) as directed by provider Daily. 2 sprays each nostril daily 2/13/25   Garrett Harrison APRN   gabapentin (NEURONTIN) 300 MG capsule Take 1 capsule by mouth 3 (Three) Times a Day. 2/13/25   Garrett Harrison APRN   isosorbide mononitrate (IMDUR) 60 MG 24 hr tablet  5/29/21    James Salguero MD   lidocaine (LIDODERM) 5 %     James Salguero MD   meclizine (ANTIVERT) 25 MG tablet Take 1 tablet by mouth 3 (Three) Times a Day As Needed for Dizziness. 8/13/24   Garrett Hrarison APRN   meloxicam (Mobic) 7.5 MG tablet Take 2 tablets by mouth Daily.  Patient not taking: Reported on 5/12/2025 4/17/25   Alma Hutchins MD   methylPREDNISolone (MEDROL) 4 MG dose pack Take as directed 5/12/25   Veto Herrera DPM   metoprolol succinate XL (TOPROL-XL) 50 MG 24 hr tablet  5/29/21   James Salguero MD   montelukast (SINGULAIR) 10 MG tablet Take 1 tablet by mouth Every Night. 2/13/25   Garrett Harrison APRN   naproxen (Naprosyn) 500 MG tablet Take 1 tablet by mouth 2 (Two) Times a Day With Meals for 30 days. 5/12/25 6/11/25  Veto Herrera DPM   nitroglycerin (NITROSTAT) 0.3 MG SL tablet     James Salguero MD   nitroglycerin (NITROSTAT) 0.4 MG SL tablet Place 1 tablet under the tongue. 8/26/24 8/26/25  James Salguero MD   tamsulosin (FLOMAX) 0.4 MG capsule 24 hr capsule Take 1 capsule by mouth Daily. 12/30/24   Garrett Harrison APRN   Tiotropium Bromide Monohydrate (Spiriva Respimat) 1.25 MCG/ACT aerosol solution inhaler Inhale 2 puffs Daily. 1/15/25   Garrett Harrison APRN        Social History:   Social History     Tobacco Use    Smoking status: Former     Current packs/day: 0.00     Average packs/day: 2.0 packs/day for 54.0 years (108.0 ttl pk-yrs)     Types: Cigarettes     Start date: 1961     Quit date: 2015     Years since quitting: 10.4     Passive exposure: Never    Smokeless tobacco: Never   Vaping Use    Vaping status: Never Used   Substance Use Topics    Alcohol use: Never    Drug use: Never         Review of Systems:  Review of Systems   Constitutional:  Negative for fever.   Musculoskeletal:  Positive for arthralgias.   Skin:  Positive for color change and wound.   All other systems reviewed and are  "negative.       Physical Exam:  /47 (BP Location: Right arm, Patient Position: Sitting)   Pulse 73   Temp 98.3 °F (36.8 °C) (Oral)   Resp 17   Ht 177.8 cm (70\")   Wt 88.5 kg (195 lb)   SpO2 95%   BMI 27.98 kg/m²     Physical Exam  Vitals and nursing note reviewed.   Constitutional:       General: He is not in acute distress.     Appearance: Normal appearance. He is normal weight. He is not ill-appearing, toxic-appearing or diaphoretic.   HENT:      Head: Normocephalic and atraumatic.      Nose: Nose normal.   Eyes:      Conjunctiva/sclera: Conjunctivae normal.   Cardiovascular:      Rate and Rhythm: Normal rate and regular rhythm.   Pulmonary:      Effort: Pulmonary effort is normal.      Breath sounds: Normal breath sounds.   Abdominal:      General: Abdomen is flat. There is no distension.   Musculoskeletal:         General: Normal range of motion.        Hands:       Cervical back: Normal range of motion and neck supple.      Comments: Mild erythema and swelling of the left ring finger extending to the MCP.  Decreased range of motion.  Tenderness to palpation.  Normal capillary refill of all digits.  Normal sensation.   Skin:     General: Skin is warm and dry.   Neurological:      General: No focal deficit present.      Mental Status: He is alert and oriented to person, place, and time.   Psychiatric:         Mood and Affect: Mood normal.         Behavior: Behavior normal.         Thought Content: Thought content normal.         Judgment: Judgment normal.                  Medical Decision Making:    Comorbidities that affect care:    Hypertension, hyperlipidemia, lung cancer, hepatitis C, arthritis    External Notes reviewed:    Previous Clinic Note: Urgent care visit from today for same complaint and Previous Radiological Studies: X-ray of left hand completed today      The following orders were placed and all results were independently analyzed by me:  Orders Placed This Encounter   Procedures    " Blood Culture - Blood,    Blood Culture - Blood,    [place] Draw    Sedimentation Rate    C-reactive Protein    CBC Auto Differential    Scan Slide    Lactic Acid, Plasma    IP General Consult (Use specialty-specific consult if known)    Insert Peripheral IV    CBC & Differential    Green Top (Gel)    Lavender Top    Gold Top - SST    Light Blue Top       Medications Given in the Emergency Department:  Medications   sodium chloride 0.9 % flush 10 mL (has no administration in time range)   piperacillin-tazobactam (ZOSYN) IVPB 3.375 g IVPB in 100 mL NS (VTB) (3.375 g Intravenous New Bag 6/5/25 1549)        ED Course:    ED Course as of 06/05/25 1558   Thu Jun 05, 2025   1523 Discussed patient with Tiff at [facility] who states they will consult on the patient, they will need to be excepted to the hospitalist that she wished for IV antibiotic treatment. [MD]   1549 Patient accepted by Dr. Means. [MD]      ED Course User Index  [MD] Ramses Vergara PA-C       Labs:    Lab Results (last 24 hours)       Procedure Component Value Units Date/Time    CBC & Differential [[id number]]  (Abnormal) Collected: 06/05/25 1405    Specimen: Blood Updated: 06/05/25 1517    Narrative:      The following orders were created for panel order CBC & Differential.  Procedure                               Abnormality         Status                     ---------                               -----------         ------                     CBC Auto Differential[[id number]]        Abnormal            Final result               Scan Slide[[id number]]                                       Final result                 Please view results for these tests on the individual orders.    Sedimentation Rate [[id number]]  (Abnormal) Collected: 06/05/25 1405    Specimen: Blood Updated: 06/05/25 1522     Sed Rate 30 mm/hr     C-reactive Protein [[id number]]  (Normal) Collected: 06/05/25 1405    Specimen: Blood Updated: 06/05/25 1431     C-Reactive Protein  0.46 mg/dL     CBC Auto Differential [[id number]]  (Abnormal) Collected: 06/05/25 1405    Specimen: Blood Updated: 06/05/25 1517     WBC 22.31 10*3/mm3      RBC 3.84 10*6/mm3      Hemoglobin 12.3 g/dL      Hematocrit 37.6 %      MCV 97.9 fL      MCH 32.0 pg      MCHC 32.7 g/dL      RDW 13.9 %      RDW-SD 50.4 fl      MPV 12.3 fL      Platelets 86 10*3/mm3      Neutrophil % 9.6 %      Lymphocyte % 73.4 %      Monocyte % 16.1 %      Eosinophil % 0.6 %      Basophil % 0.2 %      Immature Grans % 0.1 %      Neutrophils, Absolute 2.13 10*3/mm3      Lymphocytes, Absolute 16.38 10*3/mm3      Monocytes, Absolute 3.59 10*3/mm3      Eosinophils, Absolute 0.14 10*3/mm3      Basophils, Absolute 0.04 10*3/mm3      Immature Grans, Absolute 0.03 10*3/mm3      nRBC 0.0 /100 WBC     Narrative:      Appended report. These results have been appended to a previously verified report.    Scan Slide [[id number]] Collected: 06/05/25 1405    Specimen: Blood Updated: 06/05/25 1517     Anisocytosis Slight/1+     Macrocytes Slight/1+     Smudge Cells Mod/2+     Platelet Estimate Decreased    Lactic Acid, Plasma [[id number]]  (Normal) Collected: 06/05/25 1524    Specimen: Blood from Arm, Right Updated: 06/05/25 1546     Lactate 0.9 mmol/L     Blood Culture - Blood, Arm, Right [[id number]] Collected: 06/05/25 1524    Specimen: Blood from Arm, Right Updated: 06/05/25 1527    Blood Culture - Blood, Arm, Left [[id number]] Collected: 06/05/25 1529    Specimen: Blood from Arm, Left Updated: 06/05/25 1532             Imaging:    XR Hand 3+ View Left  Result Date: 6/5/2025  XR HAND 3+ VW LEFT Date of Exam: 6/5/2025 12:02 PM EDT Indication: Attention fourth digit /abrasion x 3 weeks, MIP joint swollen, concern for osteomyelitis Comparison: None available. Findings: Evaluation of the left hand shows no evidence for acute fracture or acute alignment abnormality. There is a healed fifth metacarpal fracture. Narrowing involves the third and fifth DIP joints and  fourth and fifth PIP joints. There are degenerative changes at the first carpal metacarpal articulation. There is additional joint space narrowing involving the second and third MCP joints. Soft tissue prominence is noted at the fourth and fifth PIP joints.     Impression: Degenerative arthrosis in the left hand with some soft tissue prominence at the fourth and fifth PIP joints. No acute osseous injury noted. Electronically Signed: Laina Long MD  6/5/2025 12:47 PM EDT  Workstation ID: [id number]        Differential Diagnosis and Discussion:    Wound Evaluation: Differential diagnosis includes but is not limited to laceration, abrasion, puncture, burn, ulcer, cellulitis, abscess, vasculitis, malignancy, and rash.    PROCEDURES:    Labs were collected in the emergency department and all labs were reviewed and interpreted by me.    No orders to display       Procedures    MDM  Number of Diagnoses or Management Options  Abrasion of left ring finger with infection  Diagnosis management comments: Patient presented to the emergency department today for evaluation of left finger infection.  X-ray was obtained urgent care and they sent him here due to concern for osteomyelitis.  On physical exam there is moderate erythema and swelling with decreased range of motion of the left ring finger.  CBC noted a white blood cell count of 22.31.  CRP is unremarkable.  Sed rate is mildly elevated.  Lactic acid normal.  Patient was started on Zosyn and consulted [facility] due to concern with patient's white blood cell count and infection of the finger.  They are agreeable to consult on patient.  Patient was then discussed with hospitalist who will accept patient for admission and transfer, Dr. Means       Amount and/or Complexity of Data Reviewed  Clinical lab tests: reviewed and ordered    Risk of Complications, Morbidity, and/or Mortality  Presenting problems: moderate  Diagnostic procedures: low  Management options:  moderate    Patient Progress  Patient progress: stable       Patient Care Considerations:    I considered ordering CT of the upper extremity of the left arm however patient is being transferred for further IV antibiotics and there was no sign of osteomyelitis on x-ray      Consultants/Shared Management Plan:    Hospitalist: I have discussed the case with Dr. Means who agrees to accept the patient for admission.  SHARED VISIT: I have discussed the case with my supervising physician, Dr. Kimbrough who states consult [facility]. The substantive portion of the medical decision was made by the attesting physician who made or approve the management plan and will take responsibility for the patient.  Clinical findings were discussed and ultimate disposition was made in consult with supervising physician.  Consultant: I have discussed the case with Dean toussaint who agrees to consult on the patient.    Social Determinants of Health:    Patient is independent, reliable, and has access to care.       Disposition and Care Coordination:    Transferred: Through independent evaluation of the patient's history, physical, and imperical data, the patient meets criteria to be transferred to another hospital for evaluation/admission.      Final diagnoses:   Abrasion of left ring finger with infection        ED Disposition       ED Disposition   Transfer to Another Facility     Condition   --    Comment   --               This medical record created using voice recognition software.             Ramses Vergara PA-C  06/05/25 [id number]

## 2025-06-05 NOTE — ED PROVIDER NOTES
"SHARED VISIT ATTESTATION:    This visit was performed by myself and an APC.  I performed the substantive portion of the medical decision making.  The management plan was made or approved by me, and I take responsibility for patient management.      SHARED VISIT NOTE:    Patient is 74 y.o. year old male that presents to the ED for evaluation of left ring finger injury and swelling along with pain and drainage..     Physical Exam    Very slight edema and erythema noted to the left ring finger with a small puncture noted on the ulnar aspect there is no active purulent drainage    ED Course:    /88 (BP Location: Right arm, Patient Position: Sitting)   Pulse 67   Temp 98 °F (36.7 °C) (Oral)   Resp 15   Ht 177.8 cm (70\")   Wt 88.5 kg (195 lb)   SpO2 99%   BMI 27.98 kg/m²       The following orders were placed and all results were independently analyzed by me:  Orders Placed This Encounter   Procedures    Blood Culture - Blood,    Blood Culture - Blood,    [place] Draw    Sedimentation Rate    C-reactive Protein    CBC Auto Differential    Scan Slide    Lactic Acid, Plasma    CBC & Differential    Green Top (Gel)    Lavender Top    Gold Top - SST    Light Blue Top       Medications Given in the Emergency Department:  Medications   piperacillin-tazobactam (ZOSYN) IVPB 3.375 g IVPB in 100 mL NS (VTB) (0 g Intravenous Stopped 6/5/25 1625)        ED Course:    ED Course as of 06/05/25 2230   Thu Jun 05, 2025   1523 Discussed patient with Tiff at [facility] who states they will consult on the patient, they will need to be excepted to the hospitalist that she wished for IV antibiotic treatment. [MD]   1543 Patient accepted by Dr. Means. [MD]      ED Course User Index  [MD] Ramses Vergara PA-C       Labs:    Lab Results (last 24 hours)       Procedure Component Value Units Date/Time    CBC & Differential [[id number]]  (Abnormal) Collected: 06/05/25 1405    Specimen: Blood Updated: 06/05/25 1517    Narrative:   "    The following orders were created for panel order CBC & Differential.  Procedure                               Abnormality         Status                     ---------                               -----------         ------                     CBC Auto Differential[[id number]]        Abnormal            Final result               Scan Slide[[id number]]                                       Final result                 Please view results for these tests on the individual orders.    Sedimentation Rate [[id number]]  (Abnormal) Collected: 06/05/25 1405    Specimen: Blood Updated: 06/05/25 1522     Sed Rate 30 mm/hr     C-reactive Protein [[id number]]  (Normal) Collected: 06/05/25 1405    Specimen: Blood Updated: 06/05/25 1431     C-Reactive Protein 0.46 mg/dL     CBC Auto Differential [[id number]]  (Abnormal) Collected: 06/05/25 1405    Specimen: Blood Updated: 06/05/25 1517     WBC 22.31 10*3/mm3      RBC 3.84 10*6/mm3      Hemoglobin 12.3 g/dL      Hematocrit 37.6 %      MCV 97.9 fL      MCH 32.0 pg      MCHC 32.7 g/dL      RDW 13.9 %      RDW-SD 50.4 fl      MPV 12.3 fL      Platelets 86 10*3/mm3      Neutrophil % 9.6 %      Lymphocyte % 73.4 %      Monocyte % 16.1 %      Eosinophil % 0.6 %      Basophil % 0.2 %      Immature Grans % 0.1 %      Neutrophils, Absolute 2.13 10*3/mm3      Lymphocytes, Absolute 16.38 10*3/mm3      Monocytes, Absolute 3.59 10*3/mm3      Eosinophils, Absolute 0.14 10*3/mm3      Basophils, Absolute 0.04 10*3/mm3      Immature Grans, Absolute 0.03 10*3/mm3      nRBC 0.0 /100 WBC     Narrative:      Appended report. These results have been appended to a previously verified report.    Scan Slide [[id number]] Collected: 06/05/25 1405    Specimen: Blood Updated: 06/05/25 1517     Anisocytosis Slight/1+     Macrocytes Slight/1+     Smudge Cells Mod/2+     Platelet Estimate Decreased    Lactic Acid, Plasma [[id number]]  (Normal) Collected: 06/05/25 1524    Specimen: Blood from Arm, Right Updated:  06/05/25 1546     Lactate 0.9 mmol/L     Blood Culture - Blood, Arm, Right [[id number]] Collected: 06/05/25 1524    Specimen: Blood from Arm, Right Updated: 06/05/25 1527    Blood Culture - Blood, Arm, Left [[id number]] Collected: 06/05/25 1529    Specimen: Blood from Arm, Left Updated: 06/05/25 1532             Imaging:    XR Hand 3+ View Left  Result Date: 6/5/2025  XR HAND 3+ VW LEFT Date of Exam: 6/5/2025 12:02 PM EDT Indication: Attention fourth digit /abrasion x 3 weeks, MIP joint swollen, concern for osteomyelitis Comparison: None available. Findings: Evaluation of the left hand shows no evidence for acute fracture or acute alignment abnormality. There is a healed fifth metacarpal fracture. Narrowing involves the third and fifth DIP joints and fourth and fifth PIP joints. There are degenerative changes at the first carpal metacarpal articulation. There is additional joint space narrowing involving the second and third MCP joints. Soft tissue prominence is noted at the fourth and fifth PIP joints.     Impression: Degenerative arthrosis in the left hand with some soft tissue prominence at the fourth and fifth PIP joints. No acute osseous injury noted. Electronically Signed: Laina Long MD  6/5/2025 12:47 PM EDT  Workstation ID: [id number]      MDM:    Procedures                         Enrrique Kimbrough DO  22:30 EDT  06/05/25         Enrrique Kimbrough DO  06/05/25 0377

## 2025-06-05 NOTE — ED NOTES
"RN has called [facility] ED three times to give report for patient transfer and has been left on hold for 10 minutes each time. RN was told the 3rd time \" we have been getting a lot of reports so you'll have to wait.\"   "

## 2025-06-09 ENCOUNTER — TELEPHONE (OUTPATIENT)
Dept: FAMILY MEDICINE CLINIC | Facility: CLINIC | Age: 75
End: 2025-06-09
Payer: MEDICARE

## 2025-06-09 NOTE — TELEPHONE ENCOUNTER
Patient got discharged from the hospital on June 8th, he was admitted for Osteomyelitis. The hospital advised him to be seen within 2-3 days of discharge and next opening is June 19th. Is it ok to add him in sooner?

## 2025-06-10 LAB
BACTERIA SPEC AEROBE CULT: NORMAL
BACTERIA SPEC AEROBE CULT: NORMAL

## 2025-06-18 ENCOUNTER — HOSPITAL ENCOUNTER (EMERGENCY)
Facility: HOSPITAL | Age: 75
Discharge: HOME | End: 2025-06-18
Attending: EMERGENCY MEDICINE
Payer: MEDICARE

## 2025-06-18 ENCOUNTER — APPOINTMENT (OUTPATIENT)
Dept: GENERAL RADIOLOGY | Facility: HOSPITAL | Age: 75
End: 2025-06-18
Payer: MEDICARE

## 2025-06-18 VITALS
DIASTOLIC BLOOD PRESSURE: 96 MMHG | SYSTOLIC BLOOD PRESSURE: 154 MMHG | RESPIRATION RATE: 20 BRPM | TEMPERATURE: 98.1 F | HEART RATE: 111 BPM | OXYGEN SATURATION: 96 %

## 2025-06-18 DIAGNOSIS — S32.010A COMPRESSION FRACTURE OF L1 VERTEBRA, INITIAL ENCOUNTER: Primary | ICD-10-CM
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
## 2025-06-18 DIAGNOSIS — M54.50 ACUTE MIDLINE LOW BACK PAIN WITHOUT SCIATICA: ICD-10-CM

## 2025-06-18 PROCEDURE — 96374 THER/PROPH/DIAG INJ IV PUSH: CPT

## 2025-06-18 PROCEDURE — 99283 EMERGENCY DEPT VISIT LOW MDM: CPT

## 2025-06-18 PROCEDURE — 96375 TX/PRO/DX INJ NEW DRUG ADDON: CPT

## 2025-06-18 PROCEDURE — 25010000002 ONDANSETRON PER 1 MG: Performed by: EMERGENCY MEDICINE

## 2025-06-18 PROCEDURE — 25010000002 MORPHINE PER 10 MG: Performed by: EMERGENCY MEDICINE

## 2025-06-18 PROCEDURE — 71111 X-RAY EXAM RIBS/CHEST4/> VWS: CPT

## 2025-06-18 RX ORDER — HYDROCODONE BITARTRATE AND ACETAMINOPHEN 5; 325 MG/1; MG/1
1 TABLET ORAL EVERY 6 HOURS PRN
Qty: 2 TABLET | Refills: 0 | Status: CANCELLED | OUTPATIENT
Start: 2025-06-18

## 2025-06-18 RX ORDER — ONDANSETRON 2 MG/ML
4 INJECTION INTRAMUSCULAR; INTRAVENOUS ONCE
Status: COMPLETED | OUTPATIENT
Start: 2025-06-18 | End: 2025-06-18

## 2025-06-18 RX ORDER — HYDROCODONE BITARTRATE AND ACETAMINOPHEN 5; 325 MG/1; MG/1
1 TABLET ORAL EVERY 6 HOURS PRN
Refills: 0 | Status: DISCONTINUED | OUTPATIENT
Start: 2025-06-18 | End: 2025-06-18 | Stop reason: HOSPADM

## 2025-06-18 RX ORDER — HYDROCODONE BITARTRATE AND ACETAMINOPHEN 5; 325 MG/1; MG/1
1 TABLET ORAL EVERY 6 HOURS PRN
Qty: 15 TABLET | Refills: 0 | Status: SHIPPED | OUTPATIENT
Start: 2025-06-18 | End: 2025-06-24 | Stop reason: SDUPTHER

## 2025-06-18 RX ADMIN — ONDANSETRON 4 MG: 2 INJECTION INTRAMUSCULAR; INTRAVENOUS at 17:41

## 2025-06-18 RX ADMIN — MORPHINE SULFATE 4 MG: 4 INJECTION, SOLUTION INTRAMUSCULAR; INTRAVENOUS at 17:41

## 2025-06-18 RX ADMIN — HYDROCODONE BITARTRATE AND ACETAMINOPHEN 1 TABLET: 5; 325 TABLET ORAL at 21:17

## 2025-06-18 NOTE — ED PROVIDER NOTES
SHARED VISIT ATTESTATION:    This visit was performed by myself and an APC.  I personally approved the management plan/medical decision making and take responsibility for the patient management.      SHARED VISIT NOTE:    Patient is 74 y.o. year old male that presents to the ED for evaluation of back pain.     Physical Exam    ED Course:    /80 (BP Location: Right arm, Patient Position: Sitting)   Pulse 68   Temp 98.1 °F (36.7 °C) (Oral)   Resp 18   SpO2 100%       The following orders were placed and all results were independently analyzed by me:  Orders Placed This Encounter   Procedures   • XR Ribs Bilateral 4+ View With PA Chest       Medications Given in the Emergency Department:  Medications   morphine injection 4 mg (has no administration in time range)   ondansetron (ZOFRAN) injection 4 mg (has no administration in time range)        ED Course:    ED Course as of 06/19/25 1737 Wed Jun 18, 2025   1705 Narrative:    XR SPINE LUMBAR COMPLETE 4+VW  Date of Exam: 6/18/2025 3:27 PM EDT    Indication: Patient fell backwards and landed on his lumbar spine earlier today.  Diffusely tender with palpation over lower lumbar vertebrae.  No abrasion or laceration.    Comparison: Lumbar spine radiograph 11/11/2016. CT lumbar spine 12/28/2024.    Findings:  There are 5 lumbar-type vertebral bodies.    Alignment is preserved.    There is an acute appearing compression fracture at L1 vertebral body. Approximately 40% height loss. No visible osseous retropulsion.    Multilevel degenerative changes including disc height loss, osteophytes, and facet arthropathy, most prominent at L5-S1.    Atherosclerotic calcifications of the abdominal aorta.  Impression:    Acute appearing compression fracture at L1 vertebral body.    Electronically Signed: Everette Márquez MD   6/18/2025 4:00 PM EDT   Workstation ID: [id number] [AS]   2024 XR Ribs Bilateral 4+ View With PA Chest  No acute fractures noted [AS]      ED Course User  Index  [AS] Seaver, Alyce B, APRN       Labs:    Lab Results (last 24 hours)       ** No results found for the last 24 hours. **             Imaging:    XR Spine Lumbar Complete 4+VW  Result Date: 6/18/2025  XR SPINE LUMBAR COMPLETE 4+VW Date of Exam: 6/18/2025 3:27 PM EDT Indication: Patient fell backwards and landed on his lumbar spine earlier today.  Diffusely tender with palpation over lower lumbar vertebrae.  No abrasion or laceration. Comparison: Lumbar spine radiograph 11/11/2016. CT lumbar spine 12/28/2024. Findings: There are 5 lumbar-type vertebral bodies. Alignment is preserved. There is an acute appearing compression fracture at L1 vertebral body. Approximately 40% height loss. No visible osseous retropulsion. Multilevel degenerative changes including disc height loss, osteophytes, and facet arthropathy, most prominent at L5-S1. Atherosclerotic calcifications of the abdominal aorta.     Impression: Acute appearing compression fracture at L1 vertebral body. Electronically Signed: Everette Márquez MD  6/18/2025 4:00 PM EDT  Workstation ID: [id number]      MDM:    Procedures    X-ray were performed in the emergency department and all X-ray impressions were independently interpreted by me.                     Leonarda Do MD  17:28 EDT  06/18/25         Leonarda Do MD  06/18/25 1728       Leonarda Do MD  06/19/25 1731

## 2025-06-18 NOTE — ED PROVIDER NOTES
Time: 5:04 PM EDT  Date of encounter:  6/18/2025  Independent Historian/Clinical History and Information was obtained by:   Patient    History is limited by: N/A    Chief Complaint: Fall      History of Present Illness:  Patient is a 74 y.o. year old male who presents to the emergency department for evaluation of fall.  Patient was holding onto some strings pulling backwards when he slipped falling onto his lower back.  Patient was seen in urgent care where he was diagnosed with a compression fracture.  While he was there he developed some shortness of breath which he thinks was because he had just moved from the x-ray table to the wheelchair and he was in a severe amount of pain and did not have any medication for it.  However because the patient's history of COPD they sent him here for evaluation.      Patient Care Team  Primary Care Provider: Garrett Harrison APRN    Past Medical History:     No Known Allergies  Past Medical History:   Diagnosis Date    Abnormal blood chemistry 09/17/2014    Elevated RBC count    Arthritis 09/16/2014    Arthropathy, unspecified     multiple sites    Cancer     Cataract fragments in left eye following surgery 01/10/2025    Cervicogenic headache 08/09/2019    Emphysema of lung 02/09/2016    Heart attack     Hepatitis C     Hyperlipemia     Hyperlipidemia 09/16/2014    Hypertension 09/16/2014    Impaired fasting glucose 09/17/2014    Lung cancer     Lung disease     Lung nodule seen on imaging study 02/09/2016    Myocardial infarction     Shortness of breath     Tobacco abuse 02/09/2016     Past Surgical History:   Procedure Laterality Date    CARDIAC SURGERY      CARDIAC VALVE SURGERY      COLONOSCOPY      COLONOSCOPY N/A 4/17/2023    Procedure: COLONOSCOPY WITH POLYPECTOMIES, HOT SNARE, CLIP APPLICATION X2;  Surgeon: Tamir España MD;  Location: [facility] ENDOSCOPY;  Service: General;  Laterality: N/A;  COLON POLYPS    CORONARY STENT PLACEMENT      FINGER SURGERY       HAND SURGERY      HERNIA REPAIR      SKIN CANCER EXCISION      TONSILLECTOMY       Family History   Problem Relation Age of Onset    Cancer Mother     Diabetes Father     Arthritis Father     Cancer Father     Stroke Other     Breast cancer Other        Home Medications:  Prior to Admission medications    Medication Sig Start Date End Date Taking? Authorizing Provider   albuterol sulfate  (90 Base) MCG/ACT inhaler Inhale 2 puffs Every 4 (Four) Hours As Needed for Wheezing. 1/15/25   Garrett Harrison APRN   ascorbic acid (VITAMIN C) 1000 MG tablet Vitamin C 1,000 mg oral tablet take 1 tablet by oral route daily   Active    ProviderJames MD   atorvastatin (LIPITOR) 40 MG tablet Take 1 tablet by mouth Every Night. 5/5/25   Garrett Harrison APRN   cetirizine (zyrTEC) 10 MG tablet  11/14/24   James Salguero MD   cholecalciferol (VITAMIN D3) 25 MCG (1000 UT) tablet Take 2 tablets by mouth Daily. 8/13/24   Garrett Harrison APRN   Cholecalciferol 1.25 MG (96171 UT) tablet 0 Refill(s) 6/6/25   James Salguero MD   Cholecalciferol 50 MCG (2000 UT) tablet Take 1 tablet by mouth Daily. 8/13/24   Garrett Harrison APRN   fluticasone (FLONASE) 50 MCG/ACT nasal spray Administer 2 sprays into the nostril(s) as directed by provider Daily. 2 sprays each nostril daily 2/13/25   Garrett Harrison APRN   gabapentin (NEURONTIN) 300 MG capsule Take 1 capsule by mouth 3 (Three) Times a Day. 2/13/25   Garrett Harrison APRN   isosorbide mononitrate (IMDUR) 60 MG 24 hr tablet  5/29/21   James Salguero MD   lidocaine (LIDODERM) 5 %     James Salguero MD   meclizine (ANTIVERT) 25 MG tablet Take 1 tablet by mouth 3 (Three) Times a Day As Needed for Dizziness. 8/13/24   Garrett Harrison APRN   metoprolol succinate XL (TOPROL-XL) 50 MG 24 hr tablet  5/29/21   James Salguero MD   montelukast (SINGULAIR) 10 MG tablet Take 1 tablet by mouth  Every Night. 2/13/25   Garrett Harrison APRN   nitroglycerin (NITROSTAT) 0.3 MG SL tablet     Provider, MD James   nitroglycerin (NITROSTAT) 0.4 MG SL tablet Place 1 tablet under the tongue. 8/26/24 8/26/25  ProviderJames MD   tamsulosin (FLOMAX) 0.4 MG capsule 24 hr capsule Take 1 capsule by mouth Daily. 12/30/24   Garrett Harrison APRN   Tiotropium Bromide Monohydrate (Spiriva Respimat) 1.25 MCG/ACT aerosol solution inhaler Inhale 2 puffs Daily. 1/15/25   Garrett Harrison APRN   benzonatate (TESSALON) 200 MG capsule Take 1 capsule by mouth 3 (Three) Times a Day As Needed for Cough.  Patient not taking: Reported on 2/13/2025 12/28/24 6/18/25  Leonarda Do MD   meloxicam (Mobic) 7.5 MG tablet Take 2 tablets by mouth Daily.  Patient not taking: Reported on 5/12/2025 4/17/25 6/18/25  Alma Hutchins MD   methylPREDNISolone (MEDROL) 4 MG dose pack Take as directed 5/12/25 6/18/25  Veto Herrera DPM        Social History:   Social History     Tobacco Use    Smoking status: Former     Current packs/day: 0.00     Average packs/day: 2.0 packs/day for 54.0 years (108.0 ttl pk-yrs)     Types: Cigarettes     Start date: 1961     Quit date: 2015     Years since quitting: 10.4     Passive exposure: Never    Smokeless tobacco: Never   Vaping Use    Vaping status: Never Used   Substance Use Topics    Alcohol use: Never    Drug use: Never         Review of Systems:  Review of Systems   Constitutional:  Negative for fever.   Respiratory:  Positive for shortness of breath.    Genitourinary:  Negative for dysuria.   Musculoskeletal:  Positive for arthralgias and back pain.        Physical Exam:  /96 (Patient Position: Lying)   Pulse 111   Temp 98.1 °F (36.7 °C) (Oral)   Resp 20   SpO2 96%     Physical Exam  Constitutional:       Appearance: Normal appearance.   HENT:      Head: Normocephalic.   Eyes:      Extraocular Movements: Extraocular movements intact.       Conjunctiva/sclera: Conjunctivae normal.   Pulmonary:      Effort: Pulmonary effort is normal.   Abdominal:      General: There is no distension.   Musculoskeletal:      Lumbar back: Bony tenderness present. Scoliosis present.   Skin:     General: Skin is warm.      Coloration: Skin is not cyanotic.   Neurological:      Mental Status: He is alert and oriented to person, place, and time.      Sensory: Sensation is intact.      Motor: Motor function is intact.   Psychiatric:         Attention and Perception: Attention and perception normal.         Mood and Affect: Mood normal.                    Medical Decision Making:      Comorbidities that affect care:    Chronic Lung Disease, Hypertension    External Notes reviewed:    Previous Clinic Note: Patient seen urgent care prior to arrival      The following orders were placed and all results were independently analyzed by me:  Orders Placed This Encounter   Procedures    [facility] ()    XR Ribs Bilateral 4+ View With PA Chest    Ambulatory Referral to Neurosurgery       Medications Given in the Emergency Department:  Medications   morphine injection 4 mg (4 mg Intravenous Given 6/18/25 1741)   ondansetron (ZOFRAN) injection 4 mg (4 mg Intravenous Given 6/18/25 1741)        ED Course:    ED Course as of 06/19/25 0103   Wed Jun 18, 2025   1705 Narrative:    XR SPINE LUMBAR COMPLETE 4+VW  Date of Exam: 6/18/2025 3:27 PM EDT    Indication: Patient fell backwards and landed on his lumbar spine earlier today.  Diffusely tender with palpation over lower lumbar vertebrae.  No abrasion or laceration.    Comparison: Lumbar spine radiograph 11/11/2016. CT lumbar spine 12/28/2024.    Findings:  There are 5 lumbar-type vertebral bodies.    Alignment is preserved.    There is an acute appearing compression fracture at L1 vertebral body. Approximately 40% height loss. No visible osseous retropulsion.    Multilevel degenerative changes including disc height loss,  osteophytes, and facet arthropathy, most prominent at L5-S1.    Atherosclerotic calcifications of the abdominal aorta.  Impression:    Acute appearing compression fracture at L1 vertebral body.    Electronically Signed: Everette Márquez MD   6/18/2025 4:00 PM EDT   Workstation ID: [id number] [AS]   2024 XR Ribs Bilateral 4+ View With PA Chest  No acute fractures noted [AS]      ED Course User Index  [AS] Seaver, Alyce B, GLORIA       Labs:    Lab Results (last 24 hours)       ** No results found for the last 24 hours. **             Imaging:    XR Ribs Bilateral 4+ View With PA Chest  Result Date: 6/18/2025  XR RIBS BILATERAL 4+ VW W PA CHEST Date of Exam: 6/18/2025 6:49 PM EDT Indication: fall; compression frax Comparison: None available. Findings: There is no evidence of displaced rib fracture bilaterally. There is no evidence of pneumothorax. Lung parenchyma demonstrates diffuse mild to moderate bilateral interstitial thickening. Heart is not enlarged.     Impression: No evidence of displaced rib fracture or pneumothorax. Diffuse mild to moderate bilateral interstitial thickening may be on the basis of pulmonary fibrosis versus interstitial edema. Electronically Signed: Hank Farnsworth MD  6/18/2025 7:49 PM EDT  Workstation ID: [id number]    XR Spine Lumbar Complete 4+VW  Result Date: 6/18/2025  XR SPINE LUMBAR COMPLETE 4+VW Date of Exam: 6/18/2025 3:27 PM EDT Indication: Patient fell backwards and landed on his lumbar spine earlier today.  Diffusely tender with palpation over lower lumbar vertebrae.  No abrasion or laceration. Comparison: Lumbar spine radiograph 11/11/2016. CT lumbar spine 12/28/2024. Findings: There are 5 lumbar-type vertebral bodies. Alignment is preserved. There is an acute appearing compression fracture at L1 vertebral body. Approximately 40% height loss. No visible osseous retropulsion. Multilevel degenerative changes including disc height loss, osteophytes, and facet arthropathy, most prominent  at L5-S1. Atherosclerotic calcifications of the abdominal aorta.     Impression: Acute appearing compression fracture at L1 vertebral body. Electronically Signed: Everette Márquez MD  6/18/2025 4:00 PM EDT  Workstation ID: [id number]        Differential Diagnosis and Discussion:    Back Pain: The patient presents with back pain. My differential diagnosis includes but is not limited to acute spinal epidural abscess, acute spinal epidural bleed, cauda equina syndrome, abdominal aortic aneurysm, aortic dissection, kidney stone, pyelonephritis, musculoskeletal back pain, spinal fracture, and osteoarthritis.     PROCEDURES:    X-ray were performed in the emergency department and all X-ray impressions were independently interpreted by me.    No orders to display       Procedures    MDM     Amount and/or Complexity of Data Reviewed  Tests in the radiology section of CPT®: reviewed           Patient Care Considerations:    MRI: I considered ordering an MRI however patient having no loss of bowel or bladder function      Consultants/Shared Management Plan:    SHARED VISIT: I have discussed the case with my supervising physician, Dr. Do who states he agrees with plan of care. The substantive portion of the medical decision was made by the attesting physician who made or approve the management plan and will take responsibility for the patient.  Clinical findings were discussed and ultimate disposition was made in consult with supervising physician.  I have discussed the case with Dr. Cartagena,  who states that the patient can be safely discharged with close follow up.    Social Determinants of Health:    Patient is independent, reliable, and has access to care.       Disposition and Care Coordination:    Discharged: The patient is suitable and stable for discharge with no need for consideration of admission.    I have explained the patient´s condition, diagnoses and treatment plan based on the information available to me at  this time. I have answered questions and addressed any concerns. The patient has a good  understanding of the patient´s diagnosis, condition, and treatment plan as can be expected at this point. The vital signs have been stable. The patient´s condition is stable and appropriate for discharge from the emergency department.      The patient will pursue further outpatient evaluation with the primary care physician or other designated or consulting physician as outlined in the discharge instructions. They are agreeable to this plan of care and follow-up instructions have been explained in detail. The patient has received these instructions in written format and has expressed an understanding of the discharge instructions. The patient is aware that any significant change in condition or worsening of symptoms should prompt an immediate return to this or the closest emergency department or call to 1.  I have explained discharge medications and the need for follow up with the patient/caretakers. This was also printed in the discharge instructions. Patient was discharged with the following medications and follow up:      Medication List        New Prescriptions      HYDROcodone-acetaminophen 5-325 MG per tablet  Commonly known as: NORCO  Take 1 tablet by mouth Every 6 (Six) Hours As Needed for Moderate Pain.               Where to Get Your Medications        These medications were sent to [facility] - [place], [place] - [address] [place] [address] - [phone]  - [phone]   [address] [facility] [address]      Phone: [phone]   HYDROcodone-acetaminophen 5-325 MG per tablet      Guido Cartagena MD  [address] [place] [id number] [phone]             Final diagnoses:   Compression fracture of L1 vertebra, initial encounter   Fall, initial encounter   Acute midline low back pain without sciatica        ED Disposition       ED Disposition   Discharge    Condition   Stable    Comment   --                This medical record created using voice recognition software.             Seaver, Alyce B, APRN  06/19/25 0106

## 2025-06-19 NOTE — DISCHARGE INSTRUCTIONS
Follow up with your primary care provider. Return to ER if symptoms worsen or fail to improve.  You are being referred to neurosurgery they should call you in 2-3 business days, however, if you have not heard from them give them a call using the number provided.  Take Norco as needed for moderate to severe pain.  You may also take ibuprofen but do not exceed 1600 mg in 24 hours.  Do not drive or operate machinery while taking Norco.  As discussed wear back brace while up moving around, you may take off and at rest.  Do not lift anything heavier than 5 pounds.

## 2025-06-23 ENCOUNTER — TELEPHONE (OUTPATIENT)
Dept: NEUROSURGERY | Facility: CLINIC | Age: 75
End: 2025-06-23
Payer: MEDICARE

## 2025-06-24 ENCOUNTER — OFFICE VISIT (OUTPATIENT)
Dept: FAMILY MEDICINE CLINIC | Facility: CLINIC | Age: 75
End: 2025-06-24
Payer: MEDICARE

## 2025-06-24 ENCOUNTER — TELEPHONE (OUTPATIENT)
Dept: FAMILY MEDICINE CLINIC | Facility: CLINIC | Age: 75
End: 2025-06-24
Payer: MEDICARE

## 2025-06-24 VITALS
TEMPERATURE: 97.6 F | OXYGEN SATURATION: 97 % | HEIGHT: 70 IN | WEIGHT: 184 LBS | DIASTOLIC BLOOD PRESSURE: 80 MMHG | HEART RATE: 97 BPM | SYSTOLIC BLOOD PRESSURE: 129 MMHG | BODY MASS INDEX: 26.34 KG/M2

## 2025-06-24 DIAGNOSIS — S32.010A COMPRESSION FRACTURE OF L1 VERTEBRA, INITIAL ENCOUNTER: ICD-10-CM

## 2025-06-24 DIAGNOSIS — J44.1 COPD WITH EXACERBATION: ICD-10-CM

## 2025-06-24 DIAGNOSIS — M54.50 ACUTE MIDLINE LOW BACK PAIN WITHOUT SCIATICA: Primary | ICD-10-CM
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
## 2025-06-24 DIAGNOSIS — Z79.899 MEDICATION MANAGEMENT: ICD-10-CM
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
## 2025-06-24 LAB
AMPHET+METHAMPHET UR QL: NEGATIVE
AMPHETAMINE INTERNAL CONTROL: NORMAL
AMPHETAMINES UR QL: NEGATIVE
BARBITURATE INTERNAL CONTROL: NORMAL
BARBITURATES UR QL SCN: NEGATIVE
BENZODIAZ UR QL SCN: NEGATIVE
BENZODIAZEPINE INTERNAL CONTROL: NORMAL
BUPRENORPHINE INTERNAL CONTROL: NORMAL
BUPRENORPHINE SERPL-MCNC: NEGATIVE NG/ML
CANNABINOIDS SERPL QL: NEGATIVE
COCAINE INTERNAL CONTROL: NORMAL
COCAINE UR QL: NEGATIVE
EXPIRATION DATE: NORMAL
Lab: NORMAL
MDMA (ECSTASY) INTERNAL CONTROL: NORMAL
MDMA UR QL SCN: NEGATIVE
METHADONE INTERNAL CONTROL: NORMAL
METHADONE UR QL SCN: NEGATIVE
METHAMPHETAMINE INTERNAL CONTROL: NORMAL
MORPHINE INTERNAL CONTROL: NORMAL
MORPHINE/OPIATES SCREEN, URINE: NEGATIVE
OXYCODONE INTERNAL CONTROL: NORMAL
OXYCODONE UR QL SCN: NEGATIVE
PCP UR QL SCN: NEGATIVE
PHENCYCLIDINE INTERNAL CONTROL: NORMAL
THC INTERNAL CONTROL: NORMAL

## 2025-06-24 PROCEDURE — 1125F AMNT PAIN NOTED PAIN PRSNT: CPT | Performed by: NURSE PRACTITIONER

## 2025-06-24 PROCEDURE — 99214 OFFICE O/P EST MOD 30 MIN: CPT | Performed by: NURSE PRACTITIONER

## 2025-06-24 PROCEDURE — 96372 THER/PROPH/DIAG INJ SC/IM: CPT | Performed by: NURSE PRACTITIONER

## 2025-06-24 PROCEDURE — 3079F DIAST BP 80-89 MM HG: CPT | Performed by: NURSE PRACTITIONER

## 2025-06-24 PROCEDURE — 3074F SYST BP LT 130 MM HG: CPT | Performed by: NURSE PRACTITIONER

## 2025-06-24 RX ORDER — DOXYCYCLINE 100 MG/1
100 CAPSULE ORAL 2 TIMES DAILY
Qty: 14 CAPSULE | Refills: 0 | Status: SHIPPED | OUTPATIENT
Start: 2025-06-24

## 2025-06-24 RX ORDER — HYDROCODONE BITARTRATE AND ACETAMINOPHEN 5; 325 MG/1; MG/1
1 TABLET ORAL EVERY 8 HOURS PRN
Qty: 90 TABLET | Refills: 0 | Status: SHIPPED | OUTPATIENT
Start: 2025-06-24

## 2025-06-24 RX ORDER — KETOROLAC TROMETHAMINE 30 MG/ML
30 INJECTION, SOLUTION INTRAMUSCULAR; INTRAVENOUS ONCE
Status: COMPLETED | OUTPATIENT
Start: 2025-06-24 | End: 2025-06-24

## 2025-06-24 RX ORDER — METHYLPREDNISOLONE ACETATE 40 MG/ML
40 INJECTION, SUSPENSION INTRA-ARTICULAR; INTRALESIONAL; INTRAMUSCULAR; SOFT TISSUE ONCE
Status: COMPLETED | OUTPATIENT
Start: 2025-06-24 | End: 2025-06-24

## 2025-06-24 RX ADMIN — METHYLPREDNISOLONE ACETATE 40 MG: 40 INJECTION, SUSPENSION INTRA-ARTICULAR; INTRALESIONAL; INTRAMUSCULAR; SOFT TISSUE at 16:21

## 2025-06-24 RX ADMIN — KETOROLAC TROMETHAMINE 30 MG: 30 INJECTION, SOLUTION INTRAMUSCULAR; INTRAVENOUS at 16:18

## 2025-06-24 NOTE — PROGRESS NOTES
Follow Up Office Visit      Patient Name: Kamron Sanchez  : 1950   MRN: [id number]     Chief Complaint:  FOLLOW UP ER       History of Present Illness: Kamron Sanchez is a 74 y.o. male who is here today to follow up for ER     Patient  fell back high  Patient has not been taking his gabapentin since he stared his pain medication    Pt requests a refill of hydrocodone  Neurosurgery consult is not until 2025      Date of encounter:  2025     Chief Complaint: Fall        History of Present Illness:  Patient is a 74 y.o. year old male who presents to the emergency department for evaluation of fall.  Patient was holding onto some strings pulling backwards when he slipped falling onto his lower back.  Patient was seen in urgent care where he was diagnosed with a compression fracture.  While he was there he developed some shortness of breath which he thinks was because he had just moved from the x-ray table to the wheelchair and he was in a severe amount of pain and did not have any medication for it.  However because the patient's history of COPD they sent him here for evaluation.    Imaging:     XR Spine Lumbar Complete 4+VW  Result Date: 2025  XR SPINE LUMBAR COMPLETE 4+VW Date of Exam: 2025 3:27 PM EDT Indication: Patient fell backwards and landed on his lumbar spine earlier today.  Diffusely tender with palpation over lower lumbar vertebrae.  No abrasion or laceration. Comparison: Lumbar spine radiograph 2016. CT lumbar spine 2024. Findings: There are 5 lumbar-type vertebral bodies. Alignment is preserved. There is an acute appearing compression fracture at L1 vertebral body. Approximately 40% height loss. No visible osseous retropulsion. Multilevel degenerative changes including disc height loss, osteophytes, and facet arthropathy, most prominent at L5-S1. Atherosclerotic calcifications of the abdominal aorta.      Impression: Acute appearing compression fracture at L1  vertebral body    Final diagnoses:   Compression fracture of L1 vertebra, initial encounter   Fall, initial encounter   Acute midline low back pain without sciatica         These medications were sent to [facility] - [place], [place] - [address] [facility] - [phone]  - [phone] FX  [address] [facility], [place] [address]        Phone: [phone]   HYDROcodone-acetaminophen 5-325 MG per tablet       Guido Cartagena MD  [address] [place], [place] [id number] [phone]       Also patient complain of increased wheezing and shortness of breath states she is using Spiriva inhaler daily albuterol but is not helping at this time denies fever denies chest pain    Subjective      Review of Systems:   Review of Systems   Constitutional:  Negative for fever.   HENT:  Negative for ear pain and sore throat.    Respiratory:  Positive for cough, shortness of breath and wheezing.    Cardiovascular:  Negative for chest pain.   Gastrointestinal:  Negative for abdominal pain.   Genitourinary:  Negative for dysuria.   Musculoskeletal:  Positive for back pain.   Neurological:  Negative for numbness.        Past Medical History:   Past Medical History:   Diagnosis Date    Abnormal blood chemistry 09/17/2014    Elevated RBC count    Arthritis 09/16/2014    Arthropathy, unspecified     multiple sites    Cancer     Cataract fragments in left eye following surgery 01/10/2025    Cervicogenic headache 08/09/2019    Emphysema of lung 02/09/2016    Heart attack     Hepatitis C     Hyperlipemia     Hyperlipidemia 09/16/2014    Hypertension 09/16/2014    Impaired fasting glucose 09/17/2014    Lung cancer     Lung disease     Lung nodule seen on imaging study 02/09/2016    Myocardial infarction     Shortness of breath     Tobacco abuse 02/09/2016       Past Surgical History:   Past Surgical History:   Procedure Laterality Date    CARDIAC SURGERY      CARDIAC VALVE SURGERY      COLONOSCOPY      COLONOSCOPY N/A 4/17/2023     Procedure: COLONOSCOPY WITH POLYPECTOMIES, HOT SNARE, CLIP APPLICATION X2;  Surgeon: Tamir España MD;  Location: [facility] ENDOSCOPY;  Service: General;  Laterality: N/A;  COLON POLYPS    CORONARY STENT PLACEMENT      FINGER SURGERY      HAND SURGERY      HERNIA REPAIR      SKIN CANCER EXCISION      TONSILLECTOMY         Family History:   Family History   Problem Relation Age of Onset    Cancer Mother     Diabetes Father     Arthritis Father     Cancer Father     Stroke Other     Breast cancer Other        Social History:   Social History     Socioeconomic History    Marital status:    Tobacco Use    Smoking status: Former     Current packs/day: 0.00     Average packs/day: 2.0 packs/day for 54.0 years (108.0 ttl pk-yrs)     Types: Cigarettes     Start date: 1961     Quit date: 2015     Years since quitting: 10.4     Passive exposure: Never    Smokeless tobacco: Never   Vaping Use    Vaping status: Never Used   Substance and Sexual Activity    Alcohol use: Never    Drug use: Never    Sexual activity: Defer       Medications:     Current Outpatient Medications:     albuterol sulfate  (90 Base) MCG/ACT inhaler, Inhale 2 puffs Every 4 (Four) Hours As Needed for Wheezing., Disp: 18 g, Rfl: 2    ascorbic acid (VITAMIN C) 1000 MG tablet, Vitamin C 1,000 mg oral tablet take 1 tablet by oral route daily   Active, Disp: , Rfl:     atorvastatin (LIPITOR) 40 MG tablet, Take 1 tablet by mouth Every Night., Disp: 90 tablet, Rfl: 1    cetirizine (zyrTEC) 10 MG tablet, , Disp: , Rfl:     cholecalciferol (VITAMIN D3) 25 MCG (1000 UT) tablet, Take 2 tablets by mouth Daily., Disp: 180 tablet, Rfl: 0    Cholecalciferol 1.25 MG (42841 UT) tablet, 0 Refill(s), Disp: , Rfl:     Cholecalciferol 50 MCG (2000 UT) tablet, Take 1 tablet by mouth Daily., Disp: 90 each, Rfl: 1    fluticasone (FLONASE) 50 MCG/ACT nasal spray, Administer 2 sprays into the nostril(s) as directed by provider Daily. 2 sprays each nostril daily, Disp:  "16 g, Rfl: 1    gabapentin (NEURONTIN) 300 MG capsule, Take 1 capsule by mouth 3 (Three) Times a Day., Disp: 270 capsule, Rfl: 1    HYDROcodone-acetaminophen (NORCO) 5-325 MG per tablet, Take 1 tablet by mouth Every 8 (Eight) Hours As Needed for Severe Pain., Disp: 90 tablet, Rfl: 0    isosorbide mononitrate (IMDUR) 60 MG 24 hr tablet, , Disp: , Rfl:     lidocaine (LIDODERM) 5 %, , Disp: , Rfl:     meclizine (ANTIVERT) 25 MG tablet, Take 1 tablet by mouth 3 (Three) Times a Day As Needed for Dizziness., Disp: 270 tablet, Rfl: 1    metoprolol succinate XL (TOPROL-XL) 50 MG 24 hr tablet, , Disp: , Rfl:     montelukast (SINGULAIR) 10 MG tablet, Take 1 tablet by mouth Every Night., Disp: 90 tablet, Rfl: 1    nitroglycerin (NITROSTAT) 0.3 MG SL tablet, , Disp: , Rfl:     nitroglycerin (NITROSTAT) 0.4 MG SL tablet, Place 1 tablet under the tongue., Disp: , Rfl:     tamsulosin (FLOMAX) 0.4 MG capsule 24 hr capsule, Take 1 capsule by mouth Daily., Disp: 90 capsule, Rfl: 1    doxycycline (MONODOX) 100 MG capsule, Take 1 capsule by mouth 2 (Two) Times a Day., Disp: 14 capsule, Rfl: 0    Fluticasone-Umeclidin-Vilant (TRELEGY) 100-62.5-25 MCG/ACT inhaler, Inhale 1 puff Daily., Disp: 28 each, Rfl: 5  No current facility-administered medications for this visit.    Allergies:   No Known Allergies        Objective     Physical Exam:  Vital Signs:   Vitals:    06/24/25 1551   BP: 129/80   Pulse: 97   Temp: 97.6 °F (36.4 °C)   SpO2: 97%   Weight: 83.5 kg (184 lb)   Height: 177.8 cm (70\")   PainSc: 10-Worst pain ever   PainLoc: Back     Body mass index is 26.4 kg/m².           Physical Exam  HENT:      Nose: Nose normal.      Mouth/Throat:      Mouth: Mucous membranes are moist.   Eyes:      Conjunctiva/sclera: Conjunctivae normal.   Cardiovascular:      Rate and Rhythm: Normal rate and regular rhythm.      Heart sounds: Normal heart sounds. No murmur heard.  Pulmonary:      Effort: Pulmonary effort is normal.      Breath sounds: " "Wheezing present.   Musculoskeletal:      Lumbar back: Bony tenderness present. Decreased range of motion.      Right lower leg: No edema.      Left lower leg: No edema.      Comments: Brace in place   Lymphadenopathy:      Cervical: No cervical adenopathy.   Skin:     General: Skin is warm and dry.   Neurological:      Mental Status: He is alert and oriented to person, place, and time.      Gait: Gait normal.             Assessment / Plan      Assessment/Plan:   Diagnoses and all orders for this visit:    1. Acute midline low back pain without sciatica (Primary)  -     ketorolac (TORADOL) injection 30 mg    2. Fall, subsequent encounter    3. Compression fracture of L1 vertebra, initial encounter  -     HYDROcodone-acetaminophen (NORCO) 5-325 MG per tablet; Take 1 tablet by mouth Every 8 (Eight) Hours As Needed for Severe Pain.  Dispense: 90 tablet; Refill: 0    4. Medication management  -     POC Medline 12 Panel Urine Drug Screen    5. COPD with exacerbation  -     methylPREDNISolone acetate (DEPO-medrol) injection 40 mg    Other orders  -     Fluticasone-Umeclidin-Vilant (TRELEGY) 100-62.5-25 MCG/ACT inhaler; Inhale 1 puff Daily.  Dispense: 28 each; Refill: 5  -     doxycycline (MONODOX) 100 MG capsule; Take 1 capsule by mouth 2 (Two) Times a Day.  Dispense: 14 capsule; Refill: 0         Compression fracture of L1 vertebrae with acute midline low back pain without sciatica secondary to fall will provide a refill of hydrocodone as patient does not have a consult with neurosurgery until next month GARETT reviewed urine drug screen obtained in office  COPD with exacerbation will discontinue Spiriva start Trelegy discus 1 puff once daily rinse mouth out after each use treat with doxycycline and Depo-Medrol 40    Follow Up:   Return in about 6 weeks (around 8/5/2025).    Julienne Harrison, GLORIA    \"Please note that portions of this note were completed with a voice recognition program.\"    "

## 2025-06-24 NOTE — TELEPHONE ENCOUNTER
Caller: Kamron Sanchez    Relationship: Self    Best call back number:     [phone]      What medication are you requesting:     HYDROcodone-acetaminophen (NORCO) 5-325 MG per tablet       What are your current symptoms: BACK PAIN FROM RUPTURED DISC    How long have you been experiencing symptoms: SINCE LAST WEDNESDAY     Have you had these symptoms before:    [x] Yes  [] No    Have you been treated for these symptoms before:   [x] Yes  [] No    If a prescription is needed, what is your preferred pharmacy and phone number: [facility] [address] [phone] [place] [phone] FX     Additional notes: PATIENT STATES THAT HE HAD A BAD FALL ON 6.18.25 THAT CAUSED SOME RUPTURED DISCS IN HIS SPINE. HE STATES THAT THE ER PRESCRIBED THIS BUT HE HAS ALREADY RUN OUT OF MEDICATION BECAUSE THEY ONLY GAVE HIM 15 PILLS AND HE IS SUPPOSED TO BE TAKING IT EVERY 6 HOURS AS NEEDED FOR PAIN.     HE STATES THAT HE WAS REFERRED TO DR LONG BUT HIS APPOINTMENT IS NOT UNTIL 7.17.25    PATIENT STATES THAT HE WOULD LIKE TO KNOW IF [facility] CAN FILL THIS UNTIL HE SEES DR LONG

## 2025-07-17 ENCOUNTER — OFFICE VISIT (OUTPATIENT)
Dept: NEUROSURGERY | Facility: CLINIC | Age: 75
End: 2025-07-17
Payer: MEDICARE

## 2025-07-17 VITALS
WEIGHT: 171 LBS | DIASTOLIC BLOOD PRESSURE: 56 MMHG | SYSTOLIC BLOOD PRESSURE: 101 MMHG | BODY MASS INDEX: 24.48 KG/M2 | HEIGHT: 70 IN | HEART RATE: 84 BPM

## 2025-07-17 DIAGNOSIS — S32.010A CLOSED COMPRESSION FRACTURE OF L1 LUMBAR VERTEBRA, INITIAL ENCOUNTER: Primary | ICD-10-CM

## 2025-07-17 NOTE — PROGRESS NOTES
"Chief Complaint  Back Pain, Difficulty having bowel movement, and Hip Pain (Right )    Subjective          Kamron Sanchez who is a 74 y.o. year old male who presents to [facility] for Evaluation of the Spine.     The patient complains of pain located in the Lumbar Spine.  Patients states the pain has been present for 1 month.  The pain came on acutely.  The pain scaled level is 4.  The pain extends to the right hip.  The pain is Intermittent and described as aching.  The pain is worse in the evening. Patient states Pain Medication makes the pain better.  Patient states Twisting makes the pain worse.    Associated Symptoms Include: Constipation, but denies loss of bowel or bladder control otherwise., Denies numbness, tingling or weakness in the legs.  Conservative Interventions Include: Pain Medications that were somewhat effective. The back brace is somewhat effective in helping back pain.    Was this the result of an injury or accident?: Yes, Fall about 1 month ago.    History of Previous Spinal Surgery?: No     reports that he quit smoking about 10 years ago. His smoking use included cigarettes. He started smoking about 64 years ago. He has a 108 pack-year smoking history. He has never been exposed to tobacco smoke. He has never used smokeless tobacco.    He denies history of osteoporosis or osteopenia.    Review of Systems   Musculoskeletal:  Positive for back pain.        Objective   Vital Signs:   /56 (BP Location: Left arm, Patient Position: Sitting)   Pulse 84   Ht 177.8 cm (70\")   Wt 77.6 kg (171 lb)   BMI 24.54 kg/m²       Physical Exam  Constitutional:       Appearance: Normal appearance. He is normal weight.   Pulmonary:      Effort: Pulmonary effort is normal.   Musculoskeletal:         General: Tenderness (midline lumbar area) present.      Comments: SLR negative bilaterally   Neurological:      General: No focal deficit present.      Mental Status: He " is alert and oriented to person, place, and time.      Sensory: No sensory deficit.      Motor: No weakness.      Deep Tendon Reflexes: Reflexes normal.   Psychiatric:         Mood and Affect: Mood normal.         Behavior: Behavior normal.        Neurological Exam  Mental Status  Alert. Oriented to person, place, and time.      Result Review     I have independently interpreted the x-ray of the lumbar spine from 6/18/2025 which shows an acute appearing L1 fracture with approximately 40% height loss.     Assessment and Plan    Diagnoses and all orders for this visit:    1. Closed compression fracture of L1 lumbar vertebra, initial encounter (Primary)  -     XR Spine Lumbar 2 or 3 View; Future    He has an L1 fracture after fall about 1 month ago with pain in the lower back which is coming and going.    He is wearing TLSO brace and will continue while upright greater than 20-30 degrees.    I am recommending the following restrictions: No heavy lifting greater than 5 lb, limit twisting and bending at the waist, avoidance of strenuous activity.    He will continue this treatment for a minimum of 8 weeks.    He will monitor for new or worsening complaints and notify us of change.    We discussed his 2-day constipation which is relieved by laxative. This may or may not be related to the spine. He will monitor this and if worsening I would recommend an MRI of the lumbar spine to assess for severe stenosis in the spine which could cause bowel or bladder dysfunction.    He will follow-up in 4 weeks with x-ray 2-3 days in advance to monitor the L1 bone.      Follow Up   Return in about 4 weeks (around 8/14/2025).  Patient was given instructions and counseling regarding his condition or for health maintenance advice. Please see specific information pulled into the AVS if appropriate.

## 2025-07-28 ENCOUNTER — TELEPHONE (OUTPATIENT)
Dept: FAMILY MEDICINE CLINIC | Facility: CLINIC | Age: 75
End: 2025-07-28
Payer: MEDICARE

## 2025-07-28 DIAGNOSIS — R32 URINARY INCONTINENCE, UNSPECIFIED TYPE: ICD-10-CM

## 2025-07-28 RX ORDER — MECLIZINE HYDROCHLORIDE 25 MG/1
25 TABLET ORAL 3 TIMES DAILY PRN
Qty: 270 TABLET | Refills: 1 | Status: SHIPPED | OUTPATIENT
Start: 2025-07-28

## 2025-07-28 RX ORDER — TAMSULOSIN HYDROCHLORIDE 0.4 MG/1
1 CAPSULE ORAL DAILY
Qty: 90 CAPSULE | Refills: 1 | Status: SHIPPED | OUTPATIENT
Start: 2025-07-28

## 2025-07-28 NOTE — TELEPHONE ENCOUNTER
Pt needing refills on the below medications:    TAMSULOSIN 0.4MG  MECLIZINE 25MG     Pt doesn't have appt until 8/15 and will need these.  Please send to [facility] on [address] [place].

## 2025-08-12 ENCOUNTER — HOSPITAL ENCOUNTER (OUTPATIENT)
Dept: GENERAL RADIOLOGY | Facility: HOSPITAL | Age: 75
Discharge: HOME | End: 2025-08-12
Admitting: PHYSICIAN ASSISTANT
Payer: MEDICARE

## 2025-08-12 DIAGNOSIS — S32.010A CLOSED COMPRESSION FRACTURE OF L1 LUMBAR VERTEBRA, INITIAL ENCOUNTER: ICD-10-CM

## 2025-08-12 PROCEDURE — 72100 X-RAY EXAM L-S SPINE 2/3 VWS: CPT

## 2025-08-14 ENCOUNTER — OFFICE VISIT (OUTPATIENT)
Dept: NEUROSURGERY | Facility: CLINIC | Age: 75
End: 2025-08-14
Payer: MEDICARE

## 2025-08-14 VITALS
SYSTOLIC BLOOD PRESSURE: 107 MMHG | BODY MASS INDEX: 24.2 KG/M2 | DIASTOLIC BLOOD PRESSURE: 59 MMHG | WEIGHT: 169 LBS | HEIGHT: 70 IN | HEART RATE: 89 BPM

## 2025-08-14 DIAGNOSIS — S32.010D CLOSED COMPRESSION FRACTURE OF L1 LUMBAR VERTEBRA WITH ROUTINE HEALING, SUBSEQUENT ENCOUNTER: Primary | ICD-10-CM

## 2025-08-14 PROBLEM — Z00.00 ENCOUNTER FOR MEDICARE ANNUAL WELLNESS EXAM: Status: ACTIVE | Noted: 2025-08-14

## 2025-08-15 ENCOUNTER — OFFICE VISIT (OUTPATIENT)
Dept: FAMILY MEDICINE CLINIC | Facility: CLINIC | Age: 75
End: 2025-08-15
Payer: MEDICARE

## 2025-08-15 VITALS
HEART RATE: 85 BPM | WEIGHT: 175.4 LBS | DIASTOLIC BLOOD PRESSURE: 65 MMHG | SYSTOLIC BLOOD PRESSURE: 106 MMHG | OXYGEN SATURATION: 97 % | BODY MASS INDEX: 25.11 KG/M2 | HEIGHT: 70 IN | TEMPERATURE: 97.6 F

## 2025-08-15 DIAGNOSIS — K59.00 CONSTIPATION, UNSPECIFIED CONSTIPATION TYPE: ICD-10-CM

## 2025-08-15 DIAGNOSIS — I25.10 CORONARY ARTERY DISEASE INVOLVING NATIVE HEART WITHOUT ANGINA PECTORIS, UNSPECIFIED VESSEL OR LESION TYPE: ICD-10-CM

## 2025-08-15 DIAGNOSIS — D72.829 LEUKOCYTOSIS, UNSPECIFIED TYPE: Primary | ICD-10-CM

## 2025-08-15 DIAGNOSIS — Z79.899 MEDICATION MANAGEMENT: ICD-10-CM

## 2025-08-15 DIAGNOSIS — D69.6 THROMBOCYTOPENIA: ICD-10-CM

## 2025-08-15 DIAGNOSIS — R73.01 IMPAIRED FASTING GLUCOSE: ICD-10-CM

## 2025-08-15 DIAGNOSIS — J30.2 SEASONAL ALLERGIES: ICD-10-CM

## 2025-08-15 DIAGNOSIS — M54.81 CERVICO-OCCIPITAL NEURALGIA: ICD-10-CM

## 2025-08-15 DIAGNOSIS — M50.30 DDD (DEGENERATIVE DISC DISEASE), CERVICAL: ICD-10-CM

## 2025-08-15 DIAGNOSIS — Z13.29 SCREENING FOR THYROID DISORDER: ICD-10-CM

## 2025-08-15 DIAGNOSIS — E55.9 VITAMIN D DEFICIENCY: ICD-10-CM

## 2025-08-15 DIAGNOSIS — Z00.00 ENCOUNTER FOR MEDICARE ANNUAL WELLNESS EXAM: ICD-10-CM

## 2025-08-15 DIAGNOSIS — I10 PRIMARY HYPERTENSION: ICD-10-CM

## 2025-08-15 DIAGNOSIS — E78.2 MIXED HYPERLIPIDEMIA: ICD-10-CM

## 2025-08-15 DIAGNOSIS — R53.83 FATIGUE, UNSPECIFIED TYPE: ICD-10-CM

## 2025-08-15 DIAGNOSIS — J43.9 PULMONARY EMPHYSEMA, UNSPECIFIED EMPHYSEMA TYPE: ICD-10-CM

## 2025-08-15 LAB
25(OH)D3 SERPL-MCNC: 43.2 NG/ML (ref 30–100)
ALBUMIN SERPL-MCNC: 3.9 G/DL (ref 3.5–5.2)
ALBUMIN/GLOB SERPL: 1.2 G/DL
ALP SERPL-CCNC: 110 U/L (ref 39–117)
ALT SERPL W P-5'-P-CCNC: 11 U/L (ref 1–41)
AMPHET+METHAMPHET UR QL: NEGATIVE
AMPHETAMINE INTERNAL CONTROL: NORMAL
AMPHETAMINES UR QL: NEGATIVE
ANION GAP SERPL CALCULATED.3IONS-SCNC: 10 MMOL/L (ref 5–15)
AST SERPL-CCNC: 18 U/L (ref 1–40)
BARBITURATE INTERNAL CONTROL: NORMAL
BARBITURATES UR QL SCN: NEGATIVE
BENZODIAZ UR QL SCN: NEGATIVE
BENZODIAZEPINE INTERNAL CONTROL: NORMAL
BILIRUB SERPL-MCNC: 0.5 MG/DL (ref 0–1.2)
BUN SERPL-MCNC: 20 MG/DL (ref 8–23)
BUN/CREAT SERPL: 21.7 (ref 7–25)
BUPRENORPHINE INTERNAL CONTROL: NORMAL
BUPRENORPHINE SERPL-MCNC: NEGATIVE NG/ML
CALCIUM SPEC-SCNC: 9.7 MG/DL (ref 8.6–10.5)
CANNABINOIDS SERPL QL: NEGATIVE
CHLORIDE SERPL-SCNC: 101 MMOL/L (ref 98–107)
CHOLEST SERPL-MCNC: 102 MG/DL (ref 0–200)
CO2 SERPL-SCNC: 25 MMOL/L (ref 22–29)
COCAINE INTERNAL CONTROL: NORMAL
COCAINE UR QL: NEGATIVE
CREAT SERPL-MCNC: 0.92 MG/DL (ref 0.76–1.27)
DEPRECATED RDW RBC AUTO: 50.6 FL (ref 37–54)
EGFRCR SERPLBLD CKD-EPI 2021: 86.2 ML/MIN/1.73
ERYTHROCYTE [DISTWIDTH] IN BLOOD BY AUTOMATED COUNT: 13.6 % (ref 12.3–15.4)
EXPIRATION DATE: NORMAL
GLOBULIN UR ELPH-MCNC: 3.3 GM/DL
GLUCOSE SERPL-MCNC: 133 MG/DL (ref 65–99)
HBA1C MFR BLD: 6.3 % (ref 4.8–5.6)
HCT VFR BLD AUTO: 37.3 % (ref 37.5–51)
HDLC SERPL-MCNC: 23 MG/DL (ref 40–60)
HGB BLD-MCNC: 12 G/DL (ref 13–17.7)
LDLC SERPL CALC-MCNC: 66 MG/DL (ref 0–100)
LDLC/HDLC SERPL: 2.96 {RATIO}
LYMPHOCYTES # BLD MANUAL: 19.43 10*3/MM3 (ref 0.7–3.1)
LYMPHOCYTES NFR BLD MANUAL: 10 % (ref 5–12)
Lab: NORMAL
MACROCYTES BLD QL SMEAR: ABNORMAL
MCH RBC QN AUTO: 32.3 PG (ref 26.6–33)
MCHC RBC AUTO-ENTMCNC: 32.2 G/DL (ref 31.5–35.7)
MCV RBC AUTO: 100.5 FL (ref 79–97)
MDMA (ECSTASY) INTERNAL CONTROL: NORMAL
MDMA UR QL SCN: NEGATIVE
METHADONE INTERNAL CONTROL: NORMAL
METHADONE UR QL SCN: NEGATIVE
METHAMPHETAMINE INTERNAL CONTROL: NORMAL
MONOCYTES # BLD: 2.56 10*3/MM3 (ref 0.1–0.9)
MORPHINE INTERNAL CONTROL: NORMAL
MORPHINE/OPIATES SCREEN, URINE: NEGATIVE
NEUTROPHILS # BLD AUTO: 3.58 10*3/MM3 (ref 1.7–7)
NEUTROPHILS NFR BLD MANUAL: 14 % (ref 42.7–76)
OXYCODONE INTERNAL CONTROL: NORMAL
OXYCODONE UR QL SCN: NEGATIVE
PATHOLOGY REVIEW: YES
PCP UR QL SCN: NEGATIVE
PHENCYCLIDINE INTERNAL CONTROL: NORMAL
PLATELET # BLD AUTO: 166 10*3/MM3 (ref 140–450)
PMV BLD AUTO: 12 FL (ref 6–12)
POTASSIUM SERPL-SCNC: 4.3 MMOL/L (ref 3.5–5.2)
PROT SERPL-MCNC: 7.2 G/DL (ref 6–8.5)
RBC # BLD AUTO: 3.71 10*6/MM3 (ref 4.14–5.8)
SCAN SLIDE: NORMAL
SMALL PLATELETS BLD QL SMEAR: ADEQUATE
SODIUM SERPL-SCNC: 136 MMOL/L (ref 136–145)
T4 SERPL-MCNC: 8.13 MCG/DL (ref 4.5–11.7)
THC INTERNAL CONTROL: NORMAL
TRIGL SERPL-MCNC: 55 MG/DL (ref 0–150)
TSH SERPL DL<=0.05 MIU/L-ACNC: 1.33 UIU/ML (ref 0.27–4.2)
VARIANT LYMPHS NFR BLD MANUAL: 11 % (ref 0–5)
VARIANT LYMPHS NFR BLD MANUAL: 65 % (ref 19.6–45.3)
VIT B12 BLD-MCNC: 337 PG/ML (ref 211–946)
VLDLC SERPL-MCNC: 13 MG/DL (ref 5–40)
WBC MORPH BLD: NORMAL
WBC NRBC COR # BLD AUTO: 25.57 10*3/MM3 (ref 3.4–10.8)

## 2025-08-15 PROCEDURE — 85007 BL SMEAR W/DIFF WBC COUNT: CPT | Performed by: INTERNAL MEDICINE

## 2025-08-15 PROCEDURE — 82607 VITAMIN B-12: CPT | Performed by: NURSE PRACTITIONER

## 2025-08-15 PROCEDURE — 82306 VITAMIN D 25 HYDROXY: CPT | Performed by: NURSE PRACTITIONER

## 2025-08-15 PROCEDURE — 84436 ASSAY OF TOTAL THYROXINE: CPT | Performed by: NURSE PRACTITIONER

## 2025-08-15 PROCEDURE — 80061 LIPID PANEL: CPT | Performed by: NURSE PRACTITIONER

## 2025-08-15 PROCEDURE — 85025 COMPLETE CBC W/AUTO DIFF WBC: CPT | Performed by: INTERNAL MEDICINE

## 2025-08-15 PROCEDURE — 80053 COMPREHEN METABOLIC PANEL: CPT | Performed by: NURSE PRACTITIONER

## 2025-08-15 PROCEDURE — 84443 ASSAY THYROID STIM HORMONE: CPT | Performed by: NURSE PRACTITIONER

## 2025-08-15 PROCEDURE — 83036 HEMOGLOBIN GLYCOSYLATED A1C: CPT | Performed by: NURSE PRACTITIONER

## 2025-08-15 RX ORDER — ALUMINUM ZIRCONIUM OCTACHLOROHYDREX GLY 16 G/100G
GEL TOPICAL DAILY
Qty: 660 G | Refills: 3 | Status: SHIPPED | OUTPATIENT
Start: 2025-08-15

## 2025-08-15 RX ORDER — DOCUSATE SODIUM 100 MG/1
100 CAPSULE, LIQUID FILLED ORAL 2 TIMES DAILY
Qty: 180 CAPSULE | Refills: 1 | Status: SHIPPED | OUTPATIENT
Start: 2025-08-15

## 2025-08-15 RX ORDER — ATORVASTATIN CALCIUM 40 MG/1
40 TABLET, FILM COATED ORAL NIGHTLY
Qty: 90 TABLET | Refills: 1 | Status: SHIPPED | OUTPATIENT
Start: 2025-08-15

## 2025-08-15 RX ORDER — CETIRIZINE HYDROCHLORIDE 10 MG/1
10 TABLET ORAL DAILY
Qty: 90 TABLET | Refills: 1 | Status: SHIPPED | OUTPATIENT
Start: 2025-08-15

## 2025-08-15 RX ORDER — CHOLECALCIFEROL (VITAMIN D3) 25 MCG
2000 TABLET ORAL DAILY
Qty: 180 TABLET | Refills: 0 | Status: SHIPPED | OUTPATIENT
Start: 2025-08-15

## 2025-08-15 RX ORDER — GABAPENTIN 300 MG/1
300 CAPSULE ORAL 3 TIMES DAILY
Qty: 270 CAPSULE | Refills: 1 | Status: SHIPPED | OUTPATIENT
Start: 2025-08-15

## 2025-08-15 RX ORDER — MONTELUKAST SODIUM 10 MG/1
10 TABLET ORAL NIGHTLY
Qty: 90 TABLET | Refills: 1 | Status: SHIPPED | OUTPATIENT
Start: 2025-08-15

## 2025-08-19 LAB
CYTO UR: NORMAL
LAB AP CASE REPORT: NORMAL
LAB AP CLINICAL INFORMATION: NORMAL
Lab: NORMAL
PATH REPORT.FINAL DX SPEC: NORMAL
PATH REPORT.GROSS SPEC: NORMAL

## 2025-08-25 LAB
CLL TARGETGENE PANEL RESULT: NORMAL
IGVH RESULT: NORMAL

## 2025-08-26 LAB — Lab: NORMAL

## (undated) DEVICE — CONN JET HYDRA H20 AUXILIARY DISP

## (undated) DEVICE — GLV SURG SENSICARE PI ORTHO SZ8 LF STRL

## (undated) DEVICE — GOWN,REINFRCE,POLY,SIRUS,BREATH SLV,XXLG: Brand: MEDLINE

## (undated) DEVICE — SOL IRRG H2O PL/BG 1000ML STRL

## (undated) DEVICE — SOL IRR NACL 0.9PCT BT 1000ML

## (undated) DEVICE — SNAR POLYP CAPTIFLEX XS/OVL 11X2.4MM 240CM 1P/U

## (undated) DEVICE — STERILE POLYISOPRENE POWDER-FREE SURGICAL GLOVES WITH EMOLLIENT COATING: Brand: PROTEXIS

## (undated) DEVICE — LINER SURG CANSTR SXN S/RIGD 1500CC

## (undated) DEVICE — SOLIDIFIER LIQLOC PLS 1500CC BT

## (undated) DEVICE — Device

## (undated) DEVICE — PAD GRND REM POLYHESIVE A/ DISP

## (undated) DEVICE — Device: Brand: DEFENDO AIR/WATER/SUCTION AND BIOPSY VALVE

## (undated) DEVICE — THE SINGLE USE ETRAP – POLYP TRAP IS USED FOR SUCTION RETRIEVAL OF ENDOSCOPICALLY REMOVED POLYPS.: Brand: ETRAP